# Patient Record
Sex: MALE | Race: WHITE | Employment: OTHER | URBAN - METROPOLITAN AREA
[De-identification: names, ages, dates, MRNs, and addresses within clinical notes are randomized per-mention and may not be internally consistent; named-entity substitution may affect disease eponyms.]

---

## 2019-02-04 RX ORDER — ACETAMINOPHEN 500 MG
1000 TABLET ORAL ONCE
Status: CANCELLED | OUTPATIENT
Start: 2019-02-13 | End: 2019-02-13

## 2019-02-04 RX ORDER — PREGABALIN 75 MG/1
150 CAPSULE ORAL ONCE
Status: CANCELLED | OUTPATIENT
Start: 2019-02-13 | End: 2019-02-13

## 2019-02-04 RX ORDER — CEFAZOLIN SODIUM/WATER 2 G/20 ML
2 SYRINGE (ML) INTRAVENOUS ONCE
Status: CANCELLED | OUTPATIENT
Start: 2019-02-13 | End: 2019-02-13

## 2019-02-04 RX ORDER — SODIUM CHLORIDE, SODIUM LACTATE, POTASSIUM CHLORIDE, CALCIUM CHLORIDE 600; 310; 30; 20 MG/100ML; MG/100ML; MG/100ML; MG/100ML
25 INJECTION, SOLUTION INTRAVENOUS CONTINUOUS
Status: CANCELLED | OUTPATIENT
Start: 2019-02-13

## 2019-02-04 RX ORDER — CELECOXIB 200 MG/1
400 CAPSULE ORAL ONCE
Status: CANCELLED | OUTPATIENT
Start: 2019-02-13 | End: 2019-02-13

## 2019-02-05 ENCOUNTER — HOSPITAL ENCOUNTER (OUTPATIENT)
Dept: PREADMISSION TESTING | Age: 64
Discharge: HOME | End: 2019-02-05
Payer: MEDICARE

## 2019-02-05 ENCOUNTER — HOSPITAL ENCOUNTER (OUTPATIENT)
Dept: PHYSICAL THERAPY | Age: 64
Discharge: HOME | End: 2019-02-05

## 2019-02-05 VITALS
WEIGHT: 171 LBS | HEIGHT: 70 IN | HEART RATE: 69 BPM | DIASTOLIC BLOOD PRESSURE: 74 MMHG | TEMPERATURE: 98.2 F | BODY MASS INDEX: 24.48 KG/M2 | SYSTOLIC BLOOD PRESSURE: 140 MMHG | OXYGEN SATURATION: 100 % | RESPIRATION RATE: 16 BRPM

## 2019-02-05 LAB
ABO + RH BLD: NORMAL
ALBUMIN SERPL-MCNC: 3.8 G/DL (ref 3.5–5)
ALBUMIN/GLOB SERPL: 1 {RATIO} (ref 1.1–2.2)
ALP SERPL-CCNC: 75 U/L (ref 45–117)
ALT SERPL-CCNC: 96 U/L (ref 12–78)
ANION GAP SERPL CALC-SCNC: 6 MMOL/L (ref 5–15)
APPEARANCE UR: CLEAR
AST SERPL-CCNC: 68 U/L (ref 15–37)
ATRIAL RATE: 69 BPM
BACTERIA URNS QL MICRO: NEGATIVE /HPF
BILIRUB SERPL-MCNC: 0.4 MG/DL (ref 0.2–1)
BILIRUB UR QL: NEGATIVE
BLOOD GROUP ANTIBODIES SERPL: NORMAL
BUN SERPL-MCNC: 11 MG/DL (ref 6–20)
BUN/CREAT SERPL: 14 (ref 12–20)
CALCIUM SERPL-MCNC: 8.4 MG/DL (ref 8.5–10.1)
CALCULATED P AXIS, ECG09: 60 DEGREES
CALCULATED R AXIS, ECG10: 30 DEGREES
CALCULATED T AXIS, ECG11: 43 DEGREES
CHLORIDE SERPL-SCNC: 108 MMOL/L (ref 97–108)
CO2 SERPL-SCNC: 25 MMOL/L (ref 21–32)
COLOR UR: ABNORMAL
CREAT SERPL-MCNC: 0.78 MG/DL (ref 0.7–1.3)
DIAGNOSIS, 93000: NORMAL
EPITH CASTS URNS QL MICRO: ABNORMAL /LPF
ERYTHROCYTE [DISTWIDTH] IN BLOOD BY AUTOMATED COUNT: 11.9 % (ref 11.5–14.5)
EST. AVERAGE GLUCOSE BLD GHB EST-MCNC: 108 MG/DL
GLOBULIN SER CALC-MCNC: 3.7 G/DL (ref 2–4)
GLUCOSE SERPL-MCNC: 106 MG/DL (ref 65–100)
GLUCOSE UR STRIP.AUTO-MCNC: NEGATIVE MG/DL
HBA1C MFR BLD: 5.4 % (ref 4.2–6.3)
HCT VFR BLD AUTO: 38.4 % (ref 36.6–50.3)
HGB BLD-MCNC: 13.3 G/DL (ref 12.1–17)
HGB UR QL STRIP: NEGATIVE
HYALINE CASTS URNS QL MICRO: ABNORMAL /LPF (ref 0–5)
INR PPP: 1.1 (ref 0.9–1.1)
KETONES UR QL STRIP.AUTO: NEGATIVE MG/DL
LEUKOCYTE ESTERASE UR QL STRIP.AUTO: NEGATIVE
MCH RBC QN AUTO: 34.4 PG (ref 26–34)
MCHC RBC AUTO-ENTMCNC: 34.6 G/DL (ref 30–36.5)
MCV RBC AUTO: 99.2 FL (ref 80–99)
MUCOUS THREADS URNS QL MICRO: ABNORMAL /LPF
NITRITE UR QL STRIP.AUTO: NEGATIVE
NRBC # BLD: 0 K/UL (ref 0–0.01)
NRBC BLD-RTO: 0 PER 100 WBC
P-R INTERVAL, ECG05: 178 MS
PH UR STRIP: 7 [PH] (ref 5–8)
PLATELET # BLD AUTO: 146 K/UL (ref 150–400)
PMV BLD AUTO: 10.6 FL (ref 8.9–12.9)
POTASSIUM SERPL-SCNC: 4.1 MMOL/L (ref 3.5–5.1)
PROT SERPL-MCNC: 7.5 G/DL (ref 6.4–8.2)
PROT UR STRIP-MCNC: NEGATIVE MG/DL
PROTHROMBIN TIME: 11.4 SEC (ref 9–11.1)
Q-T INTERVAL, ECG07: 408 MS
QRS DURATION, ECG06: 92 MS
QTC CALCULATION (BEZET), ECG08: 437 MS
RBC # BLD AUTO: 3.87 M/UL (ref 4.1–5.7)
RBC #/AREA URNS HPF: ABNORMAL /HPF (ref 0–5)
SODIUM SERPL-SCNC: 139 MMOL/L (ref 136–145)
SP GR UR REFRACTOMETRY: 1.01 (ref 1–1.03)
SPECIMEN EXP DATE BLD: NORMAL
UA: UC IF INDICATED,UAUC: ABNORMAL
UROBILINOGEN UR QL STRIP.AUTO: 0.2 EU/DL (ref 0.2–1)
VENTRICULAR RATE, ECG03: 69 BPM
WBC # BLD AUTO: 6.5 K/UL (ref 4.1–11.1)
WBC URNS QL MICRO: ABNORMAL /HPF (ref 0–4)

## 2019-02-05 PROCEDURE — 97110 THERAPEUTIC EXERCISES: CPT

## 2019-02-05 PROCEDURE — 97116 GAIT TRAINING THERAPY: CPT

## 2019-02-05 PROCEDURE — 97161 PT EVAL LOW COMPLEX 20 MIN: CPT

## 2019-02-05 PROCEDURE — 81001 URINALYSIS AUTO W/SCOPE: CPT

## 2019-02-05 PROCEDURE — 36415 COLL VENOUS BLD VENIPUNCTURE: CPT

## 2019-02-05 PROCEDURE — 85027 COMPLETE CBC AUTOMATED: CPT

## 2019-02-05 PROCEDURE — 80053 COMPREHEN METABOLIC PANEL: CPT

## 2019-02-05 PROCEDURE — 93005 ELECTROCARDIOGRAM TRACING: CPT

## 2019-02-05 PROCEDURE — 85610 PROTHROMBIN TIME: CPT

## 2019-02-05 PROCEDURE — 83036 HEMOGLOBIN GLYCOSYLATED A1C: CPT

## 2019-02-05 PROCEDURE — 86900 BLOOD TYPING SEROLOGIC ABO: CPT

## 2019-02-05 RX ORDER — PREGABALIN 150 MG/1
150 CAPSULE ORAL ONCE
Status: CANCELLED | OUTPATIENT
Start: 2019-02-13 | End: 2019-02-13

## 2019-02-05 RX ORDER — SODIUM CHLORIDE, SODIUM LACTATE, POTASSIUM CHLORIDE, CALCIUM CHLORIDE 600; 310; 30; 20 MG/100ML; MG/100ML; MG/100ML; MG/100ML
25 INJECTION, SOLUTION INTRAVENOUS CONTINUOUS
Status: CANCELLED | OUTPATIENT
Start: 2019-02-13

## 2019-02-05 RX ORDER — CELECOXIB 200 MG/1
400 CAPSULE ORAL ONCE
Status: CANCELLED | OUTPATIENT
Start: 2019-02-13 | End: 2019-02-13

## 2019-02-05 RX ORDER — ACETAMINOPHEN 500 MG
1000 TABLET ORAL ONCE
Status: CANCELLED | OUTPATIENT
Start: 2019-02-13 | End: 2019-02-13

## 2019-02-05 RX ORDER — ERGOCALCIFEROL 1.25 MG/1
50000 CAPSULE ORAL
COMMUNITY

## 2019-02-05 RX ORDER — CEFAZOLIN SODIUM/WATER 2 G/20 ML
2 SYRINGE (ML) INTRAVENOUS ONCE
Status: CANCELLED | OUTPATIENT
Start: 2019-02-06 | End: 2019-02-06

## 2019-02-05 NOTE — PERIOP NOTES
Preventing Infections Before and After  Your SurgeryIMPORTANT INSTRUCTIONSPlease read and follow these instructions carefully. If you are unable to comply with the below instructions your procedure will be cancelled. Every Night for Three (3) nights before your surgery: 1. Shower with an antibacterial soap, such as Dial, or the soap provided at your preassessment appointment. A shower is better than a bath for cleaning your skin. 2. If needed, ask someone to help you reach all areas of your body. Dont forget to clean your belly button with every shower. The night before your surgery: If you lose your Hibiclens please contact surgery center or you can purchase it at a local pharmacy 1. On the night before your surgery, shower with an antibacterial soap, such as Dial, or the soap provided at your preassessment appointment. 2. With one packet of Hibiclens in hand, turn water off. 
3. Apply Hibiclens antiseptic skin cleanser with a clean, freshly washed washcloth. ? Gently apply to your body from chin to toes (except the genital area) and especially the area(s) where your incision(s) will be. ? Leave Hibiclens on your skin for at least 20 seconds. CAUTION: If needed, Hibiclens may be used to clean the folds of skin of the legs (such as in the area of the groin) and on your buttocks and hips. However, do not use Hibiclens above the neck or in the genital area (your bottom) or put inside any area of your body. 4. Turn the water back on and rinse. 5. Dry gently with a clean, freshly washed towel. 6. After your shower, do not use any powder, deodorant, perfumes or lotion. 7. Use clean, freshly washed towels and washcloths every time you shower. 8. Wear clean, freshly washed pajamas to bed the night before surgery. 9. Sleep on clean, freshly washed sheets. 10. Do not allow pets to sleep in your bed with you. The Morning of your surgery: 1. Shower again thoroughly with an antibacterial soap, such as Dial or the soap provided at your preassessment appointment. If needed, ask someone for help to reach all areas of your body. Dont forget to clean your belly button! Rinse. 2. Dry gently with a clean, freshly washed towel. 3. After your shower, do not use any powder, deodorant, perfumes or lotion prior to surgery. 4. Put on clean, freshly washed clothing. Tips to help prevent infections after your surgery: 1. Protect your surgical wound from germs: 
? Hand washing is the most important thing you and your caregivers can do to prevent infections. ? Keep your bandage clean and dry! ? Do not touch your surgical wound. 2. Use clean, freshly washed towels and washcloths every time you shower; do not share bath linens with others. 3. Until your surgical wound is healed, wear clothing and sleep on bed linens each day that are clean and freshly washed. 4. Do not allow pets to sleep in your bed with you or touch your surgical wound. 5. Do not smoke  smoking delays wound healing. This may be a good time to stop smoking. 6. If you have diabetes, it is important for you to manage your blood sugar levels properly before your surgery as well as after your surgery. Poorly managed blood sugar levels slow down wound healing and prevent you from healing completely. If you lose your Hibiclens, please call the [facility], or it is available for purchase at your pharmacy. ___________________      ___________________      ________________ 
(Signature of Patient)          (Witness)                   (Date and Time)

## 2019-02-05 NOTE — PROGRESS NOTES
[facility] Physical Therapy Pre-surgery evaluation [address] [facility], [address] [facility] physical Therapy pre THR surgery EVALUATION Patient: Rhea Stafford (1 y.o. male) Date: 2/5/2019 Primary Diagnosis: left hip Precautions:    
 
ASSESSMENT : 
Based on the objective data described below, the patient presents with impaired gait, balance, pain, and overall high level functional mobility due to end stage degenerative joint disease in the left hip. Has forearm crutches and plans to get RW ads backup plan. Demonstrated good understanding of THR protocol exercises when reviewed. Provided gait training with forearm crutches - he demonstrated good understanding and performance following training. He plans to return home day of surgery or day after. PLOF - independent with all mobility and ADLs but limited by pain in L hip. Hopes to be able to golf and garden with much less pain once recovered from surgery. Discussed anticipated disposition to home with possible discharge within a 1 to 2 day time frame post-surgery. Patient and  in agreement. GOALS: (Goals have been discussed and agreed upon with patient.) DISCHARGE GOALS: Time Frame: 1 DAY 1. Patient will demonstrate increased strength, range of motion, and pain control via a home exercise program in order to minimize functional deficits in preparation for their upcoming surgery. This will be achieved by using education, demonstration and through the use of an informational handout including a home exercise program. 
REHABILITATION POTENTIAL FOR STATED GOALS: Good RECOMMENDATIONS AND PLANNED INTERVENTIONS: (Benefits and precautions of physical therapy have been discussed with the patient.) 1. Home Exercise Program 
TREATMENT PLAN EFFECTIVE DATES: 2/5/2019 TO 2/5/2019 FREQUENCY/DURATION: Patient to continue to perform home exercise program at least twice daily until his surgery.   
 
SUBJECTIVE:  
 Patient stated You were very informative.  OBJECTIVE DATA SUMMARY:  
HISTORY:   
Past Medical History:  
Diagnosis Date  Arthritis   
 hip  Hypertension  Ill-defined condition Glaucoma Bilat  Other ill-defined conditions(799.89)   
 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  
Procedure Laterality Date  COLONOSCOPY N/A 10/24/2016 COLONOSCOPY performed by Darrin Blanco MD at [facility] ENDOSCOPY  COLONOSCOPY,DIAGNOSTIC  10/24/2016  HX HEENT  4-13-15 LARYNGOSCOPY, BX  
 HX HERNIA REPAIR    
 2 sgys, 1 each side as infant and teenager  HX OTHER SURGICAL    
 colonoscopy/polyp  HX TONSILLECTOMY  [place] COLSC FLX W/RMVL OF TUMOR POLYP LESION SNARE TQ  4/19/2012 Prior Level of Function/Home Situation: Lives with spouse in 1 story home with 4 total steps to enter home(1 step each  by entrance and rooms in home). Independent in all ADLs and mobility, but limited by pain. No falls. Has forearm crutches and will try to borrow RW as backup plan. He has a friend who will be  post surgery. Personal factors and/or comorbidities impacting plan of care: None Patient []   does  [x]   does not state signs/symptoms of shortness of breath/dyspnea on exertion/respiratory distress. Home Situation Home Environment: Private residence # Steps to Enter: 4(4 sepatrate 1 step/no rail) Rails to Enter: No 
Wheelchair Ramp: No 
One/Two Story Residence: One story Living Alone: No 
Support Systems: Spouse/Significant Other/Partner, Friends \ neighbors Patient Expects to be Discharged to[de-identified] Private residence Current DME Used/Available at Home: Crutches(forarm crutches) Tub or Shower Type: Shower EXAMINATION/PRESENTATION/DECISION MAKING:  
 
ADLs (Current Functional Status): Bathing/Showering:  
[x] Independent 
[] Requires Assistance from Someone 
[] Sponge Bath Only Ambulation: 
[x] Independent [x] Walk Indoors Only 
[] Walk Outdoors [] Use Assistive Device [] Use Wheelchair Only Dressing: 
[x] Independent Requires Assistance from Someone for: 
[] Sock/Shoes 
[] Pants 
[] Everything Household Activities: 
[] Routine house and yard work [x] Light Housework Only 
[] None Critical Behavior: 
Neurologic State: Alert Orientation Level: Oriented X4 Cognition: Appropriate decision making, Appropriate for age attention/concentration, Appropriate safety awareness, Follows commands Safety/Judgement: Good awareness of safety precautions, Awareness of environment Strength:   
Strength: Generally decreased, functional(3-/5 L hip; 4+/5 R hip) Tone & Sensation:  
Tone: Normal 
  
  
  
  
Sensation: Intact Range Of Motion: 
AROM: Generally decreased, functional(3-/5 L hip; 4+/5 R hip) PROM: Within functional limits(L hip decreased flexion, extension and  abd) Coordination: 
Coordination: Within functional limits Functional Mobility: 
Transfers: 
Sit to Stand: Modified independent Stand to Sit: Modified independent Balance:  
Sitting: Intact Standing: Intact Ambulation/Gait Training: 
Distance (ft): 75 Feet (ft) Assistive Device: Crutches(forearm) Ambulation - Level of Assistance: Modified independent Gait Description (WDL): Exceptions to [facility] Gait Abnormalities: Antalgic, Path deviations, Trunk sway increased Left Side Weight Bearing: As tolerated Base of Support: Widened Stance: Left decreased Speed/Jessica: Pace decreased (<100 feet/min) Step Length: Left shortened, Right shortened Swing Pattern: Left asymmetrical 
  
  
  
  
 Therapeutic Exercises:  
The patient was educated in, has demonstrated, and has received written instructions to complete for their home exercise program per total hip replacement protocol. Functional Measure: 
Lower Extremity Functional Scale (LEFS): 
 
Score 28/80 Percentage of impairment CH 
0% CI 
1-19% CJ 
20-39% CK 
40-59% CL 
60-79% CM 
80-99% CN 
100% LEFS score: 
0-80 80 64-79 47-63 31-46 16-30 1-15 0 Cutt-offs: None establishedTKA and WILLIAM:  
(Hardy et al, 2000) Constance Kwan Ultramar 112 = 9 points MCID = 9 points Pain: 
Pain Scale 1: Numeric (0 - 10) Activity Tolerance:  
Good. Patient []   does  [x]   does not demonstrate signs/symptoms of shortness of breath/dyspnea on exertion/respiratory distress. COMMUNICATION/EDUCATION:  
The patient was educated on: 
[x]         Early post operative mobility is imperative to achieve a patient's desired outcomes and to restore biological function. [x]         Post operative hip precautions may/may not be applicable. These precautions are based on the patient's physician and the procedure(s) performed. [x]         Anterior hip precautions including: ·       No hip extension ·       No external rotation ·       No crossing of the legs/midline 
 
[]         Posterior hip precautions including: ·       No hip flexion >90 degrees ·       No internal rotation ·       No crossing of the legs/midline The patients plan of care was discussed as follows:  
[x]         The patient verbalized understanding of his plan in preparation for their upcoming surgery 
[x]         The patient's  was present for this session 
[]        The patient reports that he/she does not have a  identified at this time 
[x]         The  verbalized understanding of the education regarding the patient's upcoming surgery [x]         Patient/family agree to work toward stated goals and plan of care. []         Patient understands intent and goals of therapy, but is neutral about his/her participation. []         Patient is unable to participate in goal setting and plan of care. Thank you for this referral. 
Doren Severs, PT Time Calculation: 40 mins

## 2019-02-05 NOTE — PERIOP NOTES
[facility] Preoperative Instructions Surgery Date 2/13/19          Time of Arrival :  To Be Called      Contact # [phone] 1. On the day of your surgery, please report to the Surgical Services Registration Desk and sign in at your designated time. The Surgery Center is located to the right of the Emergency Room. 2. You must have someone with you to drive you home. You should not drive a car for 24 hours following surgery. Please make arrangements for a friend or family member to stay with you for the first 24 hours after your surgery. 3. Do not have anything to eat or drink (including water, gum, mints, coffee, juice) after midnight ? 2/12/19  . ? This may not apply to medications prescribed by your physician. ?(Please note below the special instructions with medications to take the morning of your procedure.) 4. We recommend you do not drink any alcoholic beverages for 24 hours before and after your surgery. 5. Contact your surgeons office for instructions on the following medications: non-steroidal anti-inflammatory drugs (i.e. Advil, Aleve), vitamins, and supplements. (Some surgeons will want you to stop these medications prior to surgery and others may allow you to take them) **If you are currently taking Plavix, Coumadin, Aspirin and/or other blood-thinning agents, contact your surgeon for instructions. ** Your surgeon will partner with the physician prescribing these medications to determine if it is safe to stop or if you need to continue taking. Please do not stop taking these medications without instructions from your surgeon 6. Wear comfortable clothes. Wear glasses instead of contacts. Do not bring any money or jewelry. Please bring picture ID, insurance card, and any prearranged co-payment or hospital payment. Do not wear make-up, particularly mascara the morning of your surgery.   Do not wear nail polish, particularly if you are having foot /hand surgery. Wear your hair loose or down, no ponytails, buns, krista pins or clips. All body piercings must be removed. Please shower with antibacterial soap for three consecutive days before and on the morning of surgery, but do not apply any lotions, powders or deodorants after the shower on the day of surgery. Please use a fresh towels after each shower. Please sleep in clean clothes and change bed linens the night before surgery. Please do not shave for 48 hours prior to surgery. Shaving of the face is acceptable. 7. You should understand that if you do not follow these instructions your surgery may be cancelled. If your physical condition changes (I.e. fever, cold or flu) please contact your surgeon as soon as possible. 8. It is important that you be on time. If a situation occurs where you may be late, please call [phone] (OR Holding Area). 9. If you have any questions and or problems, please call [phone] (Pre-admission Testing). 10. Your surgery time may be subject to change. You will receive a phone call the evening prior if your time changes. 11.  If having outpatient surgery, you must have someone to drive you here, stay with you during the duration of your stay, and to drive you home at time of discharge. 12.   In an effort to improve the efficiency, privacy, and safety for all of our Pre-op patients visitors are not allowed in the Holding area. Once you arrive and are registered your family/visitors will be asked to remain in the waiting room. The Pre-op staff will get you from the Surgical Waiting Area and will explain to you and your family/visitors that the Pre-op phase is beginning. The staff will answer any questions and provide instructions for tracking of the patient, by use of the existing tracking number and color-coded status board in the waiting room.   At this time the staff will also ask for your designated spokesperson information in the event that the physician or staff need to provide an update or obtain any pertinent information. The designated spokesperson will be notified if the physician needs to speak to family during the pre-operative phase. If at any time your family/visitors has questions or concerns they may approach the volunteer desk in the waiting area for assistance. Special Instructions: Use Incentive Spirometer 20 times daily prior to surgery. MEDICATIONS TO TAKE THE MORNING OF SURGERY WITH A SIP OF WATER: Amlodipine, Carvedilol, Cosopt, Doxazosin, Hydrocodone if needed I understand a pre-operative phone call will be made to verify my surgery time. In the event that I am not available, I give permission for a message to be left on my answering service and/or with another person? yes 
 
 
 
 ___________________      __________   _________ 
  (Signature of Patient)             (Witness)                (Date and Time)

## 2019-02-05 NOTE — PERIOP NOTES
Incentive Lexi Henderson Using the incentive spirometer helps expand the small air sacs of your lungs, helps you breathe deeply, and helps improve your lung function. Use your incentive spirometer twice a day (10 breaths each time) prior to surgery. How to Use Your Incentive Spirometer: 1. Hold the incentive spirometer in an upright position. 2. Breathe out as usual.  
3. Place the mouthpiece in your mouth and seal your lips tightly around it. 4. Take a deep breath. Breathe in slowly and as deeply as possible. Keep the blue flow rate guide between the arrows. 5. Hold your breath as long as possible. Then exhale slowly and allow the piston to fall to the bottom of the column. 6. Rest for a few seconds and repeat steps one through five at least 10 times. PAT Tidal Volume_____2500_____________  x___10_________  Date_____2/5/19__________________ BRING THE INCENTIVE SPIROMETER WITH YOU TO THE HOSPITAL ON THE DAY OF YOUR SURGERY. Opportunity given to ask and answer questions as well as to observe return demonstration. Patient signature_____________________________    Witness____________________________

## 2019-02-06 LAB
BACTERIA SPEC CULT: NORMAL
BACTERIA SPEC CULT: NORMAL
SERVICE CMNT-IMP: NORMAL

## 2019-02-12 NOTE — PERIOP NOTES
Preop phone call made and TOA given. Patient instructed that they may have up to [facility] of water after midnight until 0400, then NPO. Patient verbalized understanding.

## 2019-02-12 NOTE — H&P
Ismael Morrison MD - Adult Reconstruction and Total Joint Replacement Orthopaedic History and Physical 
    NAME: Alivia Sabillon :  1955 MRN:  [id number] Subjective:  
Patient ID: Alivia Sabillon is a 70 y.o. male. 
  
Chief Complaint: Pain of the Left Hip 
  
He presents for evaluation of gradually worsening left hip pain ongoing for a while and discussion of L WILLIAM. No complaints of the contralateral side. He smokes about half a pack per day, is not diabetic, denies any cardiac, pulmonary or kidney issues. 
  
 
There are no active problems to display for this patient. Past Medical History:  
Diagnosis Date  Arthritis   
 hip  Hypertension  Ill-defined condition Glaucoma Bilat  Other ill-defined conditions(799.89)   
 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  
Procedure Laterality Date  COLONOSCOPY N/A 10/24/2016 COLONOSCOPY performed by Patricia Giraldo MD at [facility] ENDOSCOPY  COLONOSCOPY,DIAGNOSTIC  10/24/2016  HX HEENT  4-13-15 LARYNGOSCOPY, BX and removal x 2  
 HX HERNIA REPAIR    
 2 sgys, 1 each side as infant and teenager  HX OTHER SURGICAL    
 colonoscopy/polyp  HX TONSILLECTOMY  [place] COLSC FLX W/RMVL OF TUMOR POLYP LESION SNARE TQ  2012 Prior to Admission medications Medication Sig Start Date End Date Taking? Authorizing Provider  
ergocalciferol (VITAMIN D2) 50,000 unit capsule Take 50,000 Units by mouth every seven (7) days.     Provider, Historical  
ferrous sulfate (IRON) 325 mg (65 mg iron) tablet Take 65 mg by mouth Daily (before breakfast). Provider, Historical  
bimatoprost (LUMIGAN) 0.01 % ophthalmic drops Administer 1 Drop to both eyes every evening. Provider, Historical  
fish oil-dha-epa 1,200-144-216 mg cap Take 2 Tabs by mouth two (2) times a day.     Provider, Historical  
 rosuvastatin (CRESTOR) 40 mg tablet Take 20 mg by mouth two (2) times a day. Provider, Historical  
HYDROcodone-acetaminophen (NORCO) 7.5-325 mg per tablet Take 1 Tab by mouth as needed for Pain. Provider, Historical  
doxazosin (CARDURA) 4 mg tablet Take 4 mg by mouth daily. Provider, Historical  
CARVEDILOL (COREG PO) Take 12.5 mg by mouth two (2) times a day. Provider, Historical  
AMLODIPINE BESYLATE (AMLODIPINE PO) Take 10 mg by mouth every morning. Provider, Historical  
diclofenac EC (VOLTAREN) 75 mg EC tablet Take 75 mg by mouth two (2) times a day. Provider, Historical  
DORZOLAMIDE HCL/TIMOLOL MALEAT (COSOPT OP) Apply 2 Drops to eye two (2) times a day. Provider, Historical  
multivitamin (ONE A DAY) tablet Take 1 Tab by mouth daily. Provider, Historical  
GLUC/VERNELL-MSM#1/C/CASSIE/BROCK/BOR (OSTEO BI-FLEX PO) Take 1 Tab by mouth every evening. Provider, Historical  
 
Allergies Allergen Reactions  Ace Inhibitors Shortness of Breath and Swelling  Bee Sting [Sting, Bee] Shortness of Breath and Swelling  Sulfa (Sulfonamide Antibiotics) Other (comments) As infant- unknown reaction Social History Tobacco Use  Smoking status: Former Smoker Packs/day: 0.50 Years: 40.00 Pack years: 20.00 Last attempt to quit: 2019 Years since quittin.0  Smokeless tobacco: Never Used  Tobacco comment: 3-4 CIGS PER DAY Substance Use Topics  Alcohol use: Yes Alcohol/week: 16.8 oz Types: 28 Cans of beer per week Comment: 16 BEERS PER WEEK Family History Problem Relation Age of Onset  Hypertension Mother Karla.Shelter Arthritis-osteo Mother  Diabetes Father  Glaucoma Father  No Known Problems Sister  No Known Problems Sister  No Known Problems Sister  No Known Problems Sister  Anesth Problems Neg Hx REVIEW OF SYSTEMS: A comprehensive review of systems was negative except for that written in the HPI. Objective:  
Constitutional: He appears stated age. Pt is cooperative and is in no acute distress. Well nourished. Well developed. Body habitus is normal. Body mass index is 23.34 kg/m². Assistive devices: none Eyes:  Sclera are nonicteric. Respiratory:  No labored breathing. Cardiovascular:  No marked edema. Well perfused extremities bilaterally. Skin:  No marked skin ulcers. No lymphedema or skin abnormalities. Neurological:  No marked sensory loss noted. Grossly neurovascularly intact. Both lower extremities are intact to distal sensory and motor function. Psychiatric: Alert and oriented x3. 
  
MUSCULOSKELETAL: Lumbar spine is nonfocal. No obvious LLD. 5/5 BLE strength. Limited ROM of the L hip with essentially no IR. Pain with PROM. Nontender ipsilateral knee exam. 
  
  
Radiographs:   
  
X-ray Hip Left 2+ Views ([id number]) 
  Result Date: 1/8/2019 Supine. AP, Frog Lateral.  
  
Impression: Severe arthritic changes throughout the hip with bone on bone articulation, cystic changes in the femoral head, osteophytes on both side of the joint, sclerosis, cam lesion. 
  
 Assessment:  
  
    ICD-10-CM 1. Primary osteoarthritis of left hip M16.12  
   
  
 Plan: At this time, I recommended L WILLIAM. Conservative treatments including activity modification, medication, and steroid injections have not successfully relieved pain. Surgery was discussed at length with the pt today. We went over all the pertinent risks, benefits, and alternatives to the procedure. They understand no guarantees can be made about the outcome and they would like to proceed. I discussed the pre-op total joint class and general recovery timeline with the pt. The pt understands the need for medical clearance. I recommended smoking cessation.   We will plan for the L WILLIAM in 3-6 weeks.  
  
   
  
Scribed for Dr. Chandrika Tompkins by Hanley Meigs Janora Idol, PA

## 2019-02-13 ENCOUNTER — ANESTHESIA EVENT (OUTPATIENT)
Dept: SURGERY | Age: 64
DRG: 470 | End: 2019-02-13
Payer: MEDICARE

## 2019-02-13 ENCOUNTER — APPOINTMENT (OUTPATIENT)
Dept: GENERAL RADIOLOGY | Age: 64
DRG: 470 | End: 2019-02-13
Attending: ORTHOPAEDIC SURGERY
Payer: MEDICARE

## 2019-02-13 ENCOUNTER — ANESTHESIA (OUTPATIENT)
Dept: SURGERY | Age: 64
DRG: 470 | End: 2019-02-13
Payer: MEDICARE

## 2019-02-13 ENCOUNTER — HOSPITAL ENCOUNTER (INPATIENT)
Age: 64
LOS: 1 days | Discharge: HOME HEALTH | DRG: 470 | End: 2019-02-14
Attending: ORTHOPAEDIC SURGERY | Admitting: ORTHOPAEDIC SURGERY
Payer: MEDICARE

## 2019-02-13 DIAGNOSIS — Z96.642 STATUS POST LEFT HIP REPLACEMENT: Primary | ICD-10-CM

## 2019-02-13 PROBLEM — Z96.649 S/P HIP REPLACEMENT: Status: ACTIVE | Noted: 2019-02-13

## 2019-02-13 PROCEDURE — 77030036660

## 2019-02-13 PROCEDURE — 77030035643 HC BLD SAW OSC PRECIS STRY -C: Performed by: ORTHOPAEDIC SURGERY

## 2019-02-13 PROCEDURE — 77010033678 HC OXYGEN DAILY

## 2019-02-13 PROCEDURE — 97116 GAIT TRAINING THERAPY: CPT | Performed by: PHYSICAL THERAPIST

## 2019-02-13 PROCEDURE — 74011000258 HC RX REV CODE- 258

## 2019-02-13 PROCEDURE — 77030032490 HC SLV COMPR SCD KNE COVD -B: Performed by: ORTHOPAEDIC SURGERY

## 2019-02-13 PROCEDURE — 77030033138 HC SUT PGA STRATFX J&J -B: Performed by: ORTHOPAEDIC SURGERY

## 2019-02-13 PROCEDURE — 73501 X-RAY EXAM HIP UNI 1 VIEW: CPT

## 2019-02-13 PROCEDURE — 77030014647 HC SEAL FBRN TISSL BAXT -D: Performed by: ORTHOPAEDIC SURGERY

## 2019-02-13 PROCEDURE — C1776 JOINT DEVICE (IMPLANTABLE): HCPCS | Performed by: ORTHOPAEDIC SURGERY

## 2019-02-13 PROCEDURE — 74011250636 HC RX REV CODE- 250/636

## 2019-02-13 PROCEDURE — 94762 N-INVAS EAR/PLS OXIMTRY CONT: CPT

## 2019-02-13 PROCEDURE — 77030020788: Performed by: ORTHOPAEDIC SURGERY

## 2019-02-13 PROCEDURE — 76000 FLUOROSCOPY <1 HR PHYS/QHP: CPT

## 2019-02-13 PROCEDURE — 74011000250 HC RX REV CODE- 250: Performed by: PHYSICIAN ASSISTANT

## 2019-02-13 PROCEDURE — 76210000016 HC OR PH I REC 1 TO 1.5 HR: Performed by: ORTHOPAEDIC SURGERY

## 2019-02-13 PROCEDURE — 74011000258 HC RX REV CODE- 258: Performed by: PHYSICIAN ASSISTANT

## 2019-02-13 PROCEDURE — 77030011640 HC PAD GRND REM COVD -A: Performed by: ORTHOPAEDIC SURGERY

## 2019-02-13 PROCEDURE — 74011250636 HC RX REV CODE- 250/636: Performed by: ORTHOPAEDIC SURGERY

## 2019-02-13 PROCEDURE — 74011250637 HC RX REV CODE- 250/637: Performed by: PHYSICIAN ASSISTANT

## 2019-02-13 PROCEDURE — 77030022704 HC SUT VLOC COVD -B: Performed by: ORTHOPAEDIC SURGERY

## 2019-02-13 PROCEDURE — 74011250636 HC RX REV CODE- 250/636: Performed by: ANESTHESIOLOGY

## 2019-02-13 PROCEDURE — 77030039267 HC ADH SKN EXOFIN S2SG -B: Performed by: ORTHOPAEDIC SURGERY

## 2019-02-13 PROCEDURE — 97161 PT EVAL LOW COMPLEX 20 MIN: CPT | Performed by: PHYSICAL THERAPIST

## 2019-02-13 PROCEDURE — 77030003666 HC NDL SPINAL BD -A: Performed by: ORTHOPAEDIC SURGERY

## 2019-02-13 PROCEDURE — 77030018846 HC SOL IRR STRL H20 ICUM -A: Performed by: ORTHOPAEDIC SURGERY

## 2019-02-13 PROCEDURE — 74011250637 HC RX REV CODE- 250/637: Performed by: ORTHOPAEDIC SURGERY

## 2019-02-13 PROCEDURE — 77030008684 HC TU ET CUF COVD -B: Performed by: NURSE ANESTHETIST, CERTIFIED REGISTERED

## 2019-02-13 PROCEDURE — 77030026438 HC STYL ET INTUB CARD -A: Performed by: NURSE ANESTHETIST, CERTIFIED REGISTERED

## 2019-02-13 PROCEDURE — 65270000029 HC RM PRIVATE

## 2019-02-13 PROCEDURE — 74011250636 HC RX REV CODE- 250/636: Performed by: PHYSICIAN ASSISTANT

## 2019-02-13 PROCEDURE — 77030019908 HC STETH ESOPH SIMS -A: Performed by: NURSE ANESTHETIST, CERTIFIED REGISTERED

## 2019-02-13 PROCEDURE — 8E0YXBF COMPUTER ASSISTED PROCEDURE OF LOWER EXTREMITY, WITH FLUOROSCOPY: ICD-10-PCS | Performed by: ORTHOPAEDIC SURGERY

## 2019-02-13 PROCEDURE — 94761 N-INVAS EAR/PLS OXIMETRY MLT: CPT

## 2019-02-13 PROCEDURE — 77030031139 HC SUT VCRL2 J&J -A: Performed by: ORTHOPAEDIC SURGERY

## 2019-02-13 PROCEDURE — 76010000161 HC OR TIME 1 TO 1.5 HR INTENSV-TIER 1: Performed by: ORTHOPAEDIC SURGERY

## 2019-02-13 PROCEDURE — 74011000250 HC RX REV CODE- 250

## 2019-02-13 PROCEDURE — 77030020782 HC GWN BAIR PAWS FLX 3M -B

## 2019-02-13 PROCEDURE — 77030018723 HC ELCTRD BLD COVD -A: Performed by: ORTHOPAEDIC SURGERY

## 2019-02-13 PROCEDURE — 76060000033 HC ANESTHESIA 1 TO 1.5 HR: Performed by: ORTHOPAEDIC SURGERY

## 2019-02-13 PROCEDURE — 77030018836 HC SOL IRR NACL ICUM -A: Performed by: ORTHOPAEDIC SURGERY

## 2019-02-13 PROCEDURE — 77030036722: Performed by: ORTHOPAEDIC SURGERY

## 2019-02-13 PROCEDURE — 77030035236 HC SUT PDS STRATFX BARB J&J -B: Performed by: ORTHOPAEDIC SURGERY

## 2019-02-13 PROCEDURE — 0SRB04A REPLACEMENT OF LEFT HIP JOINT WITH CERAMIC ON POLYETHYLENE SYNTHETIC SUBSTITUTE, UNCEMENTED, OPEN APPROACH: ICD-10-PCS | Performed by: ORTHOPAEDIC SURGERY

## 2019-02-13 PROCEDURE — 77030008467 HC STPLR SKN COVD -B: Performed by: ORTHOPAEDIC SURGERY

## 2019-02-13 DEVICE — COMPONENT HIP PRSS FT CERM ON POLYETH [EXACBSVH1COP] [EXACTECH INC]: Type: IMPLANTABLE DEVICE | Site: HIP | Status: FUNCTIONAL

## 2019-02-13 DEVICE — IMPLANTABLE DEVICE
Type: IMPLANTABLE DEVICE | Site: HIP | Status: FUNCTIONAL
Brand: EXACTECH

## 2019-02-13 DEVICE — IMPLANTABLE DEVICE
Type: IMPLANTABLE DEVICE | Site: HIP | Status: FUNCTIONAL
Brand: NOVATION

## 2019-02-13 DEVICE — IMPLANTABLE DEVICE
Type: IMPLANTABLE DEVICE | Site: HIP | Status: FUNCTIONAL
Brand: ALTEON

## 2019-02-13 RX ORDER — LIDOCAINE HYDROCHLORIDE 20 MG/ML
INJECTION, SOLUTION EPIDURAL; INFILTRATION; INTRACAUDAL; PERINEURAL AS NEEDED
Status: DISCONTINUED | OUTPATIENT
Start: 2019-02-13 | End: 2019-02-13 | Stop reason: HOSPADM

## 2019-02-13 RX ORDER — LATANOPROST 50 UG/ML
1 SOLUTION/ DROPS OPHTHALMIC EVERY EVENING
Status: DISCONTINUED | OUTPATIENT
Start: 2019-02-13 | End: 2019-02-14 | Stop reason: HOSPADM

## 2019-02-13 RX ORDER — CARVEDILOL 3.12 MG/1
12.5 TABLET ORAL 2 TIMES DAILY
Status: DISCONTINUED | OUTPATIENT
Start: 2019-02-13 | End: 2019-02-14 | Stop reason: HOSPADM

## 2019-02-13 RX ORDER — SODIUM CHLORIDE 0.9 % (FLUSH) 0.9 %
5-40 SYRINGE (ML) INJECTION EVERY 8 HOURS
Status: DISCONTINUED | OUTPATIENT
Start: 2019-02-13 | End: 2019-02-14 | Stop reason: HOSPADM

## 2019-02-13 RX ORDER — ROPIVACAINE HYDROCHLORIDE 5 MG/ML
INJECTION, SOLUTION EPIDURAL; INFILTRATION; PERINEURAL AS NEEDED
Status: DISCONTINUED | OUTPATIENT
Start: 2019-02-13 | End: 2019-02-13 | Stop reason: HOSPADM

## 2019-02-13 RX ORDER — HYDROMORPHONE HYDROCHLORIDE 2 MG/ML
INJECTION, SOLUTION INTRAMUSCULAR; INTRAVENOUS; SUBCUTANEOUS AS NEEDED
Status: DISCONTINUED | OUTPATIENT
Start: 2019-02-13 | End: 2019-02-13 | Stop reason: HOSPADM

## 2019-02-13 RX ORDER — DEXAMETHASONE SODIUM PHOSPHATE 4 MG/ML
10 INJECTION, SOLUTION INTRA-ARTICULAR; INTRALESIONAL; INTRAMUSCULAR; INTRAVENOUS; SOFT TISSUE ONCE
Status: COMPLETED | OUTPATIENT
Start: 2019-02-14 | End: 2019-02-14

## 2019-02-13 RX ORDER — AMOXICILLIN 250 MG
1 CAPSULE ORAL 2 TIMES DAILY
Qty: 60 TAB | Refills: 0 | Status: SHIPPED | OUTPATIENT
Start: 2019-02-13

## 2019-02-13 RX ORDER — FACIAL-BODY WIPES
10 EACH TOPICAL DAILY PRN
Status: DISCONTINUED | OUTPATIENT
Start: 2019-02-15 | End: 2019-02-14 | Stop reason: HOSPADM

## 2019-02-13 RX ORDER — SODIUM CHLORIDE, SODIUM LACTATE, POTASSIUM CHLORIDE, CALCIUM CHLORIDE 600; 310; 30; 20 MG/100ML; MG/100ML; MG/100ML; MG/100ML
100 INJECTION, SOLUTION INTRAVENOUS CONTINUOUS
Status: DISCONTINUED | OUTPATIENT
Start: 2019-02-13 | End: 2019-02-13 | Stop reason: HOSPADM

## 2019-02-13 RX ORDER — DIPHENHYDRAMINE HCL 12.5MG/5ML
12.5 ELIXIR ORAL
Status: DISCONTINUED | OUTPATIENT
Start: 2019-02-13 | End: 2019-02-14 | Stop reason: HOSPADM

## 2019-02-13 RX ORDER — SODIUM CHLORIDE 0.9 % (FLUSH) 0.9 %
5-40 SYRINGE (ML) INJECTION EVERY 8 HOURS
Status: DISCONTINUED | OUTPATIENT
Start: 2019-02-13 | End: 2019-02-13 | Stop reason: HOSPADM

## 2019-02-13 RX ORDER — ONDANSETRON 2 MG/ML
INJECTION INTRAMUSCULAR; INTRAVENOUS AS NEEDED
Status: DISCONTINUED | OUTPATIENT
Start: 2019-02-13 | End: 2019-02-13 | Stop reason: HOSPADM

## 2019-02-13 RX ORDER — AMOXICILLIN 250 MG
1 CAPSULE ORAL 2 TIMES DAILY
Status: DISCONTINUED | OUTPATIENT
Start: 2019-02-13 | End: 2019-02-14 | Stop reason: HOSPADM

## 2019-02-13 RX ORDER — MIDAZOLAM HYDROCHLORIDE 1 MG/ML
1 INJECTION, SOLUTION INTRAMUSCULAR; INTRAVENOUS AS NEEDED
Status: DISCONTINUED | OUTPATIENT
Start: 2019-02-13 | End: 2019-02-13 | Stop reason: HOSPADM

## 2019-02-13 RX ORDER — NALOXONE HYDROCHLORIDE 0.4 MG/ML
0.4 INJECTION, SOLUTION INTRAMUSCULAR; INTRAVENOUS; SUBCUTANEOUS AS NEEDED
Status: DISCONTINUED | OUTPATIENT
Start: 2019-02-13 | End: 2019-02-14 | Stop reason: HOSPADM

## 2019-02-13 RX ORDER — FAMOTIDINE 20 MG/1
20 TABLET, FILM COATED ORAL 2 TIMES DAILY
Status: DISCONTINUED | OUTPATIENT
Start: 2019-02-13 | End: 2019-02-14 | Stop reason: HOSPADM

## 2019-02-13 RX ORDER — ACETAMINOPHEN 500 MG
1000 TABLET ORAL ONCE
Status: COMPLETED | OUTPATIENT
Start: 2019-02-13 | End: 2019-02-13

## 2019-02-13 RX ORDER — SODIUM CHLORIDE 0.9 % (FLUSH) 0.9 %
5-40 SYRINGE (ML) INJECTION AS NEEDED
Status: DISCONTINUED | OUTPATIENT
Start: 2019-02-13 | End: 2019-02-13 | Stop reason: HOSPADM

## 2019-02-13 RX ORDER — DEXAMETHASONE SODIUM PHOSPHATE 4 MG/ML
INJECTION, SOLUTION INTRA-ARTICULAR; INTRALESIONAL; INTRAMUSCULAR; INTRAVENOUS; SOFT TISSUE AS NEEDED
Status: DISCONTINUED | OUTPATIENT
Start: 2019-02-13 | End: 2019-02-13 | Stop reason: HOSPADM

## 2019-02-13 RX ORDER — SODIUM CHLORIDE 9 MG/ML
25 INJECTION, SOLUTION INTRAVENOUS CONTINUOUS
Status: DISCONTINUED | OUTPATIENT
Start: 2019-02-13 | End: 2019-02-13 | Stop reason: HOSPADM

## 2019-02-13 RX ORDER — SODIUM CHLORIDE, SODIUM LACTATE, POTASSIUM CHLORIDE, CALCIUM CHLORIDE 600; 310; 30; 20 MG/100ML; MG/100ML; MG/100ML; MG/100ML
25 INJECTION, SOLUTION INTRAVENOUS CONTINUOUS
Status: DISCONTINUED | OUTPATIENT
Start: 2019-02-13 | End: 2019-02-13 | Stop reason: HOSPADM

## 2019-02-13 RX ORDER — DORZOLAMIDE HYDROCHLORIDE AND TIMOLOL MALEATE PRESERVATIVE FREE 20; 5 MG/ML; MG/ML
1 SOLUTION/ DROPS OPHTHALMIC 2 TIMES DAILY
Status: DISCONTINUED | OUTPATIENT
Start: 2019-02-13 | End: 2019-02-14 | Stop reason: HOSPADM

## 2019-02-13 RX ORDER — TRANEXAMIC ACID 100 MG/ML
INJECTION, SOLUTION INTRAVENOUS
Status: DISPENSED
Start: 2019-02-13 | End: 2019-02-13

## 2019-02-13 RX ORDER — LIDOCAINE HYDROCHLORIDE 10 MG/ML
0.1 INJECTION, SOLUTION EPIDURAL; INFILTRATION; INTRACAUDAL; PERINEURAL AS NEEDED
Status: DISCONTINUED | OUTPATIENT
Start: 2019-02-13 | End: 2019-02-13 | Stop reason: HOSPADM

## 2019-02-13 RX ORDER — FENTANYL CITRATE 50 UG/ML
50 INJECTION, SOLUTION INTRAMUSCULAR; INTRAVENOUS AS NEEDED
Status: DISCONTINUED | OUTPATIENT
Start: 2019-02-13 | End: 2019-02-13 | Stop reason: HOSPADM

## 2019-02-13 RX ORDER — POLYETHYLENE GLYCOL 3350 17 G/17G
17 POWDER, FOR SOLUTION ORAL DAILY
Qty: 255 G | Refills: 0 | Status: SHIPPED | OUTPATIENT
Start: 2019-02-13 | End: 2019-02-28

## 2019-02-13 RX ORDER — SODIUM CHLORIDE 9 MG/ML
INJECTION, SOLUTION INTRAVENOUS
Status: COMPLETED
Start: 2019-02-13 | End: 2019-02-13

## 2019-02-13 RX ORDER — SUCCINYLCHOLINE CHLORIDE 20 MG/ML
INJECTION INTRAMUSCULAR; INTRAVENOUS AS NEEDED
Status: DISCONTINUED | OUTPATIENT
Start: 2019-02-13 | End: 2019-02-13 | Stop reason: HOSPADM

## 2019-02-13 RX ORDER — ROPIVACAINE HYDROCHLORIDE 5 MG/ML
30 INJECTION, SOLUTION EPIDURAL; INFILTRATION; PERINEURAL AS NEEDED
Status: DISCONTINUED | OUTPATIENT
Start: 2019-02-13 | End: 2019-02-13 | Stop reason: HOSPADM

## 2019-02-13 RX ORDER — FAMOTIDINE 20 MG/1
20 TABLET, FILM COATED ORAL 2 TIMES DAILY
Qty: 60 TAB | Refills: 0 | Status: SHIPPED | OUTPATIENT
Start: 2019-02-13 | End: 2019-03-15

## 2019-02-13 RX ORDER — SODIUM CHLORIDE 0.9 % (FLUSH) 0.9 %
5-40 SYRINGE (ML) INJECTION AS NEEDED
Status: DISCONTINUED | OUTPATIENT
Start: 2019-02-13 | End: 2019-02-14 | Stop reason: HOSPADM

## 2019-02-13 RX ORDER — CEFAZOLIN SODIUM/WATER 2 G/20 ML
2 SYRINGE (ML) INTRAVENOUS EVERY 8 HOURS
Status: COMPLETED | OUTPATIENT
Start: 2019-02-13 | End: 2019-02-13

## 2019-02-13 RX ORDER — DICLOFENAC SODIUM 75 MG/1
75 TABLET, DELAYED RELEASE ORAL 2 TIMES DAILY
Status: SHIPPED | COMMUNITY
Start: 2019-02-13

## 2019-02-13 RX ORDER — DOXAZOSIN 2 MG/1
4 TABLET ORAL DAILY
Status: DISCONTINUED | OUTPATIENT
Start: 2019-02-14 | End: 2019-02-14 | Stop reason: HOSPADM

## 2019-02-13 RX ORDER — CELECOXIB 200 MG/1
400 CAPSULE ORAL ONCE
Status: COMPLETED | OUTPATIENT
Start: 2019-02-13 | End: 2019-02-13

## 2019-02-13 RX ORDER — OXYCODONE HYDROCHLORIDE 5 MG/1
5-10 TABLET ORAL
Qty: 80 TAB | Refills: 0 | Status: SHIPPED | OUTPATIENT
Start: 2019-02-13

## 2019-02-13 RX ORDER — PROPOFOL 10 MG/ML
INJECTION, EMULSION INTRAVENOUS AS NEEDED
Status: DISCONTINUED | OUTPATIENT
Start: 2019-02-13 | End: 2019-02-13 | Stop reason: HOSPADM

## 2019-02-13 RX ORDER — PREGABALIN 75 MG/1
150 CAPSULE ORAL ONCE
Status: COMPLETED | OUTPATIENT
Start: 2019-02-13 | End: 2019-02-13

## 2019-02-13 RX ORDER — HYDROMORPHONE HYDROCHLORIDE 1 MG/ML
0.5 INJECTION, SOLUTION INTRAMUSCULAR; INTRAVENOUS; SUBCUTANEOUS
Status: DISCONTINUED | OUTPATIENT
Start: 2019-02-13 | End: 2019-02-13 | Stop reason: HOSPADM

## 2019-02-13 RX ORDER — FISH OIL/DHA/EPA 1200-144MG
2 CAPSULE ORAL 2 TIMES DAILY
Status: SHIPPED | COMMUNITY
Start: 2019-02-13

## 2019-02-13 RX ORDER — MIDAZOLAM HYDROCHLORIDE 1 MG/ML
INJECTION, SOLUTION INTRAMUSCULAR; INTRAVENOUS AS NEEDED
Status: DISCONTINUED | OUTPATIENT
Start: 2019-02-13 | End: 2019-02-13 | Stop reason: HOSPADM

## 2019-02-13 RX ORDER — ONDANSETRON 2 MG/ML
4 INJECTION INTRAMUSCULAR; INTRAVENOUS
Status: DISCONTINUED | OUTPATIENT
Start: 2019-02-13 | End: 2019-02-14 | Stop reason: HOSPADM

## 2019-02-13 RX ORDER — GLYCOPYRROLATE 0.2 MG/ML
INJECTION INTRAMUSCULAR; INTRAVENOUS AS NEEDED
Status: DISCONTINUED | OUTPATIENT
Start: 2019-02-13 | End: 2019-02-13 | Stop reason: HOSPADM

## 2019-02-13 RX ORDER — SODIUM CHLORIDE 9 MG/ML
125 INJECTION, SOLUTION INTRAVENOUS CONTINUOUS
Status: DISPENSED | OUTPATIENT
Start: 2019-02-13 | End: 2019-02-14

## 2019-02-13 RX ORDER — MORPHINE SULFATE 10 MG/ML
2 INJECTION, SOLUTION INTRAMUSCULAR; INTRAVENOUS
Status: DISCONTINUED | OUTPATIENT
Start: 2019-02-13 | End: 2019-02-13 | Stop reason: HOSPADM

## 2019-02-13 RX ORDER — ATORVASTATIN CALCIUM 10 MG/1
40 TABLET, FILM COATED ORAL 2 TIMES DAILY
Status: DISCONTINUED | OUTPATIENT
Start: 2019-02-13 | End: 2019-02-14 | Stop reason: HOSPADM

## 2019-02-13 RX ORDER — POLYETHYLENE GLYCOL 3350 17 G/17G
17 POWDER, FOR SOLUTION ORAL DAILY
Status: DISCONTINUED | OUTPATIENT
Start: 2019-02-13 | End: 2019-02-14 | Stop reason: HOSPADM

## 2019-02-13 RX ORDER — ONDANSETRON 2 MG/ML
4 INJECTION INTRAMUSCULAR; INTRAVENOUS AS NEEDED
Status: DISCONTINUED | OUTPATIENT
Start: 2019-02-13 | End: 2019-02-13 | Stop reason: HOSPADM

## 2019-02-13 RX ORDER — OXYCODONE HYDROCHLORIDE 5 MG/1
10 TABLET ORAL
Status: DISCONTINUED | OUTPATIENT
Start: 2019-02-13 | End: 2019-02-14 | Stop reason: HOSPADM

## 2019-02-13 RX ORDER — ACETAMINOPHEN 325 MG/1
650 TABLET ORAL EVERY 6 HOURS
Status: DISCONTINUED | OUTPATIENT
Start: 2019-02-13 | End: 2019-02-14 | Stop reason: HOSPADM

## 2019-02-13 RX ORDER — DIPHENHYDRAMINE HYDROCHLORIDE 50 MG/ML
12.5 INJECTION, SOLUTION INTRAMUSCULAR; INTRAVENOUS AS NEEDED
Status: DISCONTINUED | OUTPATIENT
Start: 2019-02-13 | End: 2019-02-13 | Stop reason: HOSPADM

## 2019-02-13 RX ORDER — ROCURONIUM BROMIDE 10 MG/ML
INJECTION, SOLUTION INTRAVENOUS AS NEEDED
Status: DISCONTINUED | OUTPATIENT
Start: 2019-02-13 | End: 2019-02-13 | Stop reason: HOSPADM

## 2019-02-13 RX ORDER — CEFAZOLIN SODIUM/WATER 2 G/20 ML
2 SYRINGE (ML) INTRAVENOUS ONCE
Status: COMPLETED | OUTPATIENT
Start: 2019-02-13 | End: 2019-02-13

## 2019-02-13 RX ORDER — FENTANYL CITRATE 50 UG/ML
INJECTION, SOLUTION INTRAMUSCULAR; INTRAVENOUS AS NEEDED
Status: DISCONTINUED | OUTPATIENT
Start: 2019-02-13 | End: 2019-02-13 | Stop reason: HOSPADM

## 2019-02-13 RX ORDER — ACETAMINOPHEN 325 MG/1
650 TABLET ORAL EVERY 6 HOURS
Qty: 112 TAB | Refills: 0 | Status: SHIPPED | OUTPATIENT
Start: 2019-02-13 | End: 2019-02-27

## 2019-02-13 RX ORDER — MIDAZOLAM HYDROCHLORIDE 1 MG/ML
0.5 INJECTION, SOLUTION INTRAMUSCULAR; INTRAVENOUS
Status: DISCONTINUED | OUTPATIENT
Start: 2019-02-13 | End: 2019-02-13 | Stop reason: HOSPADM

## 2019-02-13 RX ORDER — KETOROLAC TROMETHAMINE 30 MG/ML
15 INJECTION, SOLUTION INTRAMUSCULAR; INTRAVENOUS EVERY 6 HOURS
Status: DISCONTINUED | OUTPATIENT
Start: 2019-02-13 | End: 2019-02-14 | Stop reason: HOSPADM

## 2019-02-13 RX ORDER — GUAIFENESIN 100 MG/5ML
81 LIQUID (ML) ORAL 2 TIMES DAILY
Qty: 60 TAB | Refills: 0 | Status: SHIPPED | OUTPATIENT
Start: 2019-02-13 | End: 2019-03-15

## 2019-02-13 RX ORDER — OXYCODONE HYDROCHLORIDE 5 MG/1
5 TABLET ORAL
Status: DISCONTINUED | OUTPATIENT
Start: 2019-02-13 | End: 2019-02-14 | Stop reason: HOSPADM

## 2019-02-13 RX ORDER — NEOSTIGMINE METHYLSULFATE 1 MG/ML
INJECTION INTRAVENOUS AS NEEDED
Status: DISCONTINUED | OUTPATIENT
Start: 2019-02-13 | End: 2019-02-13 | Stop reason: HOSPADM

## 2019-02-13 RX ORDER — MORPHINE SULFATE 2 MG/ML
2 INJECTION, SOLUTION INTRAMUSCULAR; INTRAVENOUS
Status: DISCONTINUED | OUTPATIENT
Start: 2019-02-13 | End: 2019-02-14 | Stop reason: HOSPADM

## 2019-02-13 RX ORDER — FENTANYL CITRATE 50 UG/ML
25 INJECTION, SOLUTION INTRAMUSCULAR; INTRAVENOUS
Status: COMPLETED | OUTPATIENT
Start: 2019-02-13 | End: 2019-02-13

## 2019-02-13 RX ORDER — GUAIFENESIN 100 MG/5ML
81 LIQUID (ML) ORAL 2 TIMES DAILY
Status: DISCONTINUED | OUTPATIENT
Start: 2019-02-13 | End: 2019-02-14 | Stop reason: HOSPADM

## 2019-02-13 RX ADMIN — HYDROMORPHONE HYDROCHLORIDE 0.5 MG: 1 INJECTION, SOLUTION INTRAMUSCULAR; INTRAVENOUS; SUBCUTANEOUS at 10:10

## 2019-02-13 RX ADMIN — ROCURONIUM BROMIDE 10 MG: 10 INJECTION, SOLUTION INTRAVENOUS at 08:16

## 2019-02-13 RX ADMIN — ACETAMINOPHEN 650 MG: 325 TABLET ORAL at 17:27

## 2019-02-13 RX ADMIN — OXYCODONE HYDROCHLORIDE 5 MG: 5 TABLET ORAL at 16:10

## 2019-02-13 RX ADMIN — SODIUM CHLORIDE 125 ML/HR: 900 INJECTION, SOLUTION INTRAVENOUS at 20:57

## 2019-02-13 RX ADMIN — OXYCODONE HYDROCHLORIDE 5 MG: 5 TABLET ORAL at 13:14

## 2019-02-13 RX ADMIN — SODIUM CHLORIDE, SODIUM LACTATE, POTASSIUM CHLORIDE, AND CALCIUM CHLORIDE 25 ML/HR: 600; 310; 30; 20 INJECTION, SOLUTION INTRAVENOUS at 06:54

## 2019-02-13 RX ADMIN — CARVEDILOL 12.5 MG: 3.12 TABLET, FILM COATED ORAL at 17:22

## 2019-02-13 RX ADMIN — POLYETHYLENE GLYCOL 3350 17 G: 17 POWDER, FOR SOLUTION ORAL at 12:21

## 2019-02-13 RX ADMIN — ATORVASTATIN CALCIUM 40 MG: 10 TABLET, FILM COATED ORAL at 20:55

## 2019-02-13 RX ADMIN — PROPOFOL 200 MG: 10 INJECTION, EMULSION INTRAVENOUS at 08:16

## 2019-02-13 RX ADMIN — ONDANSETRON 4 MG: 2 INJECTION INTRAMUSCULAR; INTRAVENOUS at 08:26

## 2019-02-13 RX ADMIN — SODIUM CHLORIDE 125 ML/HR: 900 INJECTION, SOLUTION INTRAVENOUS at 10:35

## 2019-02-13 RX ADMIN — FENTANYL CITRATE 25 MCG: 50 INJECTION, SOLUTION INTRAMUSCULAR; INTRAVENOUS at 09:53

## 2019-02-13 RX ADMIN — HYDROMORPHONE HYDROCHLORIDE 0.5 MG: 2 INJECTION, SOLUTION INTRAMUSCULAR; INTRAVENOUS; SUBCUTANEOUS at 08:35

## 2019-02-13 RX ADMIN — Medication 10 ML: at 17:38

## 2019-02-13 RX ADMIN — FAMOTIDINE 20 MG: 20 TABLET ORAL at 12:19

## 2019-02-13 RX ADMIN — NEOSTIGMINE METHYLSULFATE 4 MG: 1 INJECTION INTRAVENOUS at 09:16

## 2019-02-13 RX ADMIN — ACETAMINOPHEN 650 MG: 325 TABLET ORAL at 23:14

## 2019-02-13 RX ADMIN — MIDAZOLAM HYDROCHLORIDE 2 MG: 1 INJECTION, SOLUTION INTRAMUSCULAR; INTRAVENOUS at 08:10

## 2019-02-13 RX ADMIN — FENTANYL CITRATE 25 MCG: 50 INJECTION, SOLUTION INTRAMUSCULAR; INTRAVENOUS at 09:48

## 2019-02-13 RX ADMIN — FENTANYL CITRATE 25 MCG: 50 INJECTION, SOLUTION INTRAMUSCULAR; INTRAVENOUS at 09:58

## 2019-02-13 RX ADMIN — TRANEXAMIC ACID 1000 MG: 1 INJECTION, SOLUTION INTRAVENOUS at 10:35

## 2019-02-13 RX ADMIN — ACETAMINOPHEN 1000 MG: 500 TABLET ORAL at 06:54

## 2019-02-13 RX ADMIN — LIDOCAINE HYDROCHLORIDE 80 MG: 20 INJECTION, SOLUTION EPIDURAL; INFILTRATION; INTRACAUDAL; PERINEURAL at 08:16

## 2019-02-13 RX ADMIN — KETOROLAC TROMETHAMINE 15 MG: 30 INJECTION, SOLUTION INTRAMUSCULAR at 17:23

## 2019-02-13 RX ADMIN — Medication 2 G: at 08:23

## 2019-02-13 RX ADMIN — HYDROMORPHONE HYDROCHLORIDE 0.5 MG: 2 INJECTION, SOLUTION INTRAMUSCULAR; INTRAVENOUS; SUBCUTANEOUS at 08:24

## 2019-02-13 RX ADMIN — DEXAMETHASONE SODIUM PHOSPHATE 8 MG: 4 INJECTION, SOLUTION INTRA-ARTICULAR; INTRALESIONAL; INTRAMUSCULAR; INTRAVENOUS; SOFT TISSUE at 08:26

## 2019-02-13 RX ADMIN — PREGABALIN 150 MG: 75 CAPSULE ORAL at 06:54

## 2019-02-13 RX ADMIN — ROCURONIUM BROMIDE 30 MG: 10 INJECTION, SOLUTION INTRAVENOUS at 08:22

## 2019-02-13 RX ADMIN — OXYCODONE HYDROCHLORIDE 5 MG: 5 TABLET ORAL at 22:55

## 2019-02-13 RX ADMIN — OXYCODONE HYDROCHLORIDE 5 MG: 5 TABLET ORAL at 10:45

## 2019-02-13 RX ADMIN — ASPIRIN 81 MG 81 MG: 81 TABLET ORAL at 17:21

## 2019-02-13 RX ADMIN — Medication 2 G: at 23:14

## 2019-02-13 RX ADMIN — SENNOSIDES AND DOCUSATE SODIUM 1 TABLET: 8.6; 5 TABLET ORAL at 12:19

## 2019-02-13 RX ADMIN — FENTANYL CITRATE 50 MCG: 50 INJECTION, SOLUTION INTRAMUSCULAR; INTRAVENOUS at 09:27

## 2019-02-13 RX ADMIN — SENNOSIDES AND DOCUSATE SODIUM 1 TABLET: 8.6; 5 TABLET ORAL at 17:21

## 2019-02-13 RX ADMIN — KETOROLAC TROMETHAMINE 15 MG: 30 INJECTION, SOLUTION INTRAMUSCULAR at 23:14

## 2019-02-13 RX ADMIN — HYDROMORPHONE HYDROCHLORIDE 0.5 MG: 2 INJECTION, SOLUTION INTRAMUSCULAR; INTRAVENOUS; SUBCUTANEOUS at 09:16

## 2019-02-13 RX ADMIN — FENTANYL CITRATE 100 MCG: 50 INJECTION, SOLUTION INTRAMUSCULAR; INTRAVENOUS at 08:14

## 2019-02-13 RX ADMIN — FENTANYL CITRATE 25 MCG: 50 INJECTION, SOLUTION INTRAMUSCULAR; INTRAVENOUS at 09:43

## 2019-02-13 RX ADMIN — CELECOXIB 400 MG: 200 CAPSULE ORAL at 06:54

## 2019-02-13 RX ADMIN — Medication 2 G: at 16:05

## 2019-02-13 RX ADMIN — HYDROMORPHONE HYDROCHLORIDE 0.5 MG: 2 INJECTION, SOLUTION INTRAMUSCULAR; INTRAVENOUS; SUBCUTANEOUS at 08:57

## 2019-02-13 RX ADMIN — OXYCODONE HYDROCHLORIDE 5 MG: 5 TABLET ORAL at 20:00

## 2019-02-13 RX ADMIN — FENTANYL CITRATE 50 MCG: 50 INJECTION, SOLUTION INTRAMUSCULAR; INTRAVENOUS at 09:32

## 2019-02-13 RX ADMIN — FAMOTIDINE 20 MG: 20 TABLET ORAL at 17:21

## 2019-02-13 RX ADMIN — LATANOPROST 1 DROP: 50 SOLUTION OPHTHALMIC at 20:01

## 2019-02-13 RX ADMIN — GLYCOPYRROLATE 0.7 MG: 0.2 INJECTION INTRAMUSCULAR; INTRAVENOUS at 09:16

## 2019-02-13 RX ADMIN — SUCCINYLCHOLINE CHLORIDE 160 MG: 20 INJECTION INTRAMUSCULAR; INTRAVENOUS at 08:16

## 2019-02-13 NOTE — PROGRESS NOTES
Occupational Therapy Orders received and medical record reviewed. Will follow up on POD 1 to initiate OT Evaluation per guideline. Thank you.

## 2019-02-13 NOTE — ANESTHESIA POSTPROCEDURE EVALUATION
Procedure(s): LEFT TOTAL HIP ARTHROPLASTY ANTERIOR APPROACH WITH NAVIGATION. Anesthesia Post Evaluation Patient location during evaluation: PACU Note status: Adequate. Level of consciousness: responsive to verbal stimuli and sleepy but conscious Pain management: satisfactory to patient Airway patency: patent Anesthetic complications: no 
Cardiovascular status: acceptable Respiratory status: acceptable Hydration status: acceptable Comments: +Post-Anesthesia Evaluation and Assessment Patient: Dory Lopez MRN: [id number]  SSN: [id number] YOB: 1955  Age: 70 y.o. Sex: male I have evaluated the patient and the patient is stable and ready to be discharged from PACU . Cardiovascular Function/Vital Signs /72 (BP 1 Location: Right arm, BP Patient Position: At rest)   Pulse 67   Temp 36.7 °C (98.1 °F)   Resp 16   Ht 5' 10\" (1.778 m)   Wt 76 kg (167 lb 8.8 oz)   SpO2 97%   BMI 24.04 kg/m² Patient is status post Procedure(s): LEFT TOTAL HIP ARTHROPLASTY ANTERIOR APPROACH WITH NAVIGATION. Nausea/Vomiting: Controlled. Postoperative hydration reviewed and adequate. Pain: 
Pain Scale 1: Numeric (0 - 10) (02/13/19 1045) Pain Intensity 1: 5 (02/13/19 1045) Managed. Neurological Status:  
Neuro (WDL): Exceptions to WDL (02/13/19 0935) At baseline. Mental Status and Level of Consciousness: Arousable. Pulmonary Status:  
O2 Device: Nasal cannula (02/13/19 1045) Adequate oxygenation and airway patent. Complications related to anesthesia: None Post-anesthesia assessment completed. No concerns. Signed By: Aida Gustafson MD  
 2/13/2019 Visit Vitals /72 (BP 1 Location: Right arm, BP Patient Position: At rest) Pulse 67 Temp 36.7 °C (98.1 °F) Resp 16 Ht 5' 10\" (1.778 m) Wt 76 kg (167 lb 8.8 oz) SpO2 97% BMI 24.04 kg/m²

## 2019-02-13 NOTE — PROGRESS NOTES
Orthopedic End of Shift Note Bedside and Verbal shift change report given to [facility] (oncoming nurse) by Ag Shoemaker (offgoing nurse). Report included the following information SBAR, Kardex, Intake/Output, MAR and Recent Results. POD# Significant issues during shift:  
 
Issues for Physician to address Nausea/Vomiting [] yes [] no Peace Catheter [] in [] removed n/a Bowel Movements [] yes [x] no Foot Pumps or SCD [x] yes [] no Ice Pack [x] yes    [] no Incentive Spirometer [x] yes [] no Volume:   [address] [employer] Monitoring   [] yes [x] no Rhythm:  
Supplemental O2 [x] yes [] no Sat off O2:   99 Patient Vitals for the past 12 hrs: 
 Temp Pulse Resp BP SpO2  
02/13/19 1430 97.9 °F (36.6 °C) 81 18 112/59 96 % 02/13/19 1410  84  108/69   
02/13/19 1405  80  125/66   
02/13/19 1401 98 °F (36.7 °C) 66 18 120/65 96 % 02/13/19 1314 97.9 °F (36.6 °C) 70 18 133/70 99 % 02/13/19 1203 97.3 °F (36.3 °C) 69 17 121/69 99 % 02/13/19 1122 97.6 °F (36.4 °C) 67 18 140/74 99 % 02/13/19 1100 97.7 °F (36.5 °C) 66 17 116/66 96 % 02/13/19 1045  67 16 108/72 97 % 02/13/19 1030  66 14 117/63 98 % 02/13/19 1015  67 18 122/70 98 % 02/13/19 1000  66 19 113/70 95 % 02/13/19 0950  67 14 125/67 97 % 02/13/19 0945  69 14 115/70 96 % 02/13/19 0940  66 12 113/64 99 % 02/13/19 0935  71 12 122/59 94 % 02/13/19 0933 98.1 °F (36.7 °C) 86 11 112/68 95 % 02/13/19 0630 98.1 °F (36.7 °C) 60 15 150/77 100 % Lab Results Component Value Date/Time HGB 13.3 02/05/2019 11:07 AM  
 HCT 38.4 02/05/2019 11:07 AM  
 
Lab Results Component Value Date/Time INR 1.1 02/05/2019 11:07 AM  
 
 
Intake/Output Summary (Last 24 hours) at 2/13/2019 1654 Last data filed at 2/13/2019 1500 Gross per 24 hour Intake 2672.08 ml Output 200 ml Net 2472.08 ml Wound Hip Left (Active) Dressing Status  Clean, dry, and intact 2/13/2019 11:00 AM  
 Dressing Type  Topical skin adhesive/glue; Other (Comment) 2/13/2019 11:00 AM  
Incision site well approximated? Yes 2/13/2019  9:07 AM  
Drainage Amount  None 2/13/2019 11:00 AM  
Periwound Skin Condition Intact 2/13/2019 11:00 AM  
Number of days: 0 Peripheral Intravenous Line: 
Peripheral IV 02/13/19 Left;Posterior Hand (Active) Site Assessment Clean, dry, & intact 2/13/2019 11:00 AM  
Phlebitis Assessment 0 2/13/2019 11:00 AM  
Infiltration Assessment 0 2/13/2019 11:00 AM  
Dressing Status Clean, dry, & intact 2/13/2019 11:00 AM  
Dressing Type Transparent;Tape 2/13/2019 11:00 AM  
Hub Color/Line Status Pink; Infusing 2/13/2019 11:00 AM  
 
Drain(s):

## 2019-02-13 NOTE — PROGRESS NOTES
Reason for Admission: Left Hip Osteoarthritis RRAT Score: 7 Plan for utilizing home health:  [facility], At [address] [place] [address], [facility] Likelihood of Readmission:  low Transition of Care Plan:                   
CM made room visit with patient who was alert and oriented. Pt confirmed demographics (physical address: [address] [place] [address], [place] [address]), emergency contact, insurance, and PCP (last seen 1 mo ago). Pt and spouse live in single level home with 5 entry steps. Pt has DME including forearm crutches and RW. Prior to admission patient was independent with ADLs, IADLs, and driving. Pt denied any history with HH, SNF, or IPR. Pt spouse to transport pt home upon d/c.  
 
CM discussed therapy's recommendations with pt ([place]). Pt agreeable and chose [facility] first [facility], At [address] [facility], and [facility]. CM has sent referral to [facility] and is waiting for response. CM will continue to follow. Care Management Interventions PCP Verified by CM: Yes(last seen about 1 mo ago) Mode of Transport at Discharge: Other (see comment)(pt's spouse to transport pt home upon d/c) Transition of Care Consult (CM Consult): Discharge Planning, Home Health Discharge Durable Medical Equipment: No(has forearm crutches and RW) Physical Therapy Consult: Yes Occupational Therapy Consult: Yes Speech Therapy Consult: No 
Current Support Network: Lives with Spouse, Own Home, Family Lives Nearby(pt and spouse live in single level home with 5 entry steps ) Confirm Follow Up Transport: Family Plan discussed with Pt/Family/Caregiver: Yes Freedom of Choice Offered: Yes Discharge Location Discharge Placement: Home with home health [facility] Law, [address]

## 2019-02-13 NOTE — OP NOTES
Russ Whitehead MD - Adult Reconstruction and Total Joint Replacement    Genesis Almonte - MRN [id number] - : 1955 (70 y.o.)      Date: 2019    Pre-operative Diagnosis: Left Hip DJD     Post-operative Diagnosis: same     Procedure:  (1) Left Total Hip Arthroplasty, Direct Anterior Approach    (2) Intraoperative Fluoroscopy    (3) Imageless Computer Navigation     Implants Used:   Implant Name Type Inv. Item Serial No.  Lot No. LRB No. Used Action   LINER ACET NEUT 36MM GRP 3 -- NOVATION - [id number]  LINER ACET NEUT 36MM GRP 3 -- NOVATION [id number] [employer] NA Left 1 Implanted   SHELL ACET M-HLE 56MM GRA -- INTEGRIP REV - [id number]  SHELL ACET M-HLE 56MM GRA -- INTEGRIP REV [id number] [employer] NA Left 1 Implanted   Alteon BONE SCREW 6.5mm DIAMETER X 30mm LENGTH    [id number]  NA Left 1 Implanted   STEM TAPR SO HA  SZ 12 -- NOVATION - [id number]  STEM TAPR SO HA  SZ 12 -- NOVATION [id number] [employer] NA Left 1 Implanted   HEAD FEM CER 36MM OD +0MM -- BIOLOX DELTA - [id number]  HEAD FEM CER 36MM OD +0MM -- BIOLOX DELTA [id number] [employer] NA Left 1 Implanted       Anesthesia: GETA + local    Surgeon: Russ Whitehead     Assist: SARAHI Centeno (Performing all or most of the following assistant-at-surgery services including but not limited to: proper patient positioning, sterile/prep and draping, placement of instruments/trackers, operative exposure, minor portions of bone / soft tissue excision, final irrigation and debridement, deep and superficial closure, application of final dressings)    EBL: 250cc     Drains: none     Specimens: none     Complications: none     Condition: stable to PACU     Brief History: Longstanding hip pain, unresponsive to conservative treatment. The risks, benefits, and alternatives to total hip replacement were thoroughly explained. The patient understood no guarantees could be given about the outcome of the procedure and wished to proceed. Description of Procedure: After being identified in the preoperative holding area and having their operative site marked, the patient was brought back to the operating room where they underwent anesthesia to good effect. They were  placed in the supine position with a bump under the hip. The operative extremity was then prepped and draped in the usual fashion using sterile technique. Preoperative antibiotics had been administered. An appropriate time-out was performed. We began by placing the pelvic tracker with two percutaneous Shanz pins into the ipsilateral ilium. The pelvis was then registered with the navigation system. An incision was made 2 fingerbreadths lateral and distal to the ASIS. The skin flaps were developed down to the tensor fascia. This was divided sharply. Blunt dissection was taken medially over the tensor muscle. Retractors were placed superior and inferior to the femoral neck. The anterior fat pad was debrided. Circumflex vessels were identified and cauterized. A provisional neck cut was performed. The head was removed from the acetabulum. We then excised the labrum. We sequentially reamed for the acetabular shell. This was placed in the appropriate abduction and version. Position was confirmed by navigation and fluoroscopy . The liner was then impacted into the locking mechanism. We then turned our attention to the femur. Elevators were used to gain access to the proximal femur. Soft tissue releases were performed as necessary. The lateralizer was utilized. We then sequentially broached up to the final size trial. We placed a trial neck and head and had excellent restoration of leg length and offset with good soft tissue balance. We selected these as our final implants. Final components were inserted and the hip was reduced after thorough lavage. Restoration of appropriate leg length was confirmed by navigation and fluoroscopy. We again irrigated. The tensor fascia was closed. Periarticular injection was performed. Skin closure was performed in layers. A sterile dressing was applied. The patient was awakened, moved to the stretcher, and taken to the recovery room in stable condition. At the conclusion of the procedure, all counts were correct. There were no immediate complications. Additional Procedure:   Date: 2/13/2019    Preoperative diagnosis: LEFT HIP OA    Postoperative diagnosis: LEFT HIP OA    Procedure performed: Procedure(s):  LEFT TOTAL HIP ARTHROPLASTY ANTERIOR APPROACH WITH NAVIGATION    Anesthesia: General    Surgeon(s) and Role:     Sourav Darby MD - Primary    Assistant: SARAHI Peralta      This describes the use of intraoperative fluoroscopy. Fluoroscopic imaging was utilized in orthogonal planes as well as using live technique for all phases of the procedure, described separately in the operative report, including hardware placement.     Trinity Anton MD

## 2019-02-13 NOTE — PROGRESS NOTES
Ortho/ NeuroSurgery NP Note POD# 0  s/p LEFT TOTAL HIP ARTHROPLASTY ANTERIOR APPROACH WITH NAVIGATION Pt resting in bed. No complaints. Pain 4-5/10 presently. VSS Afebrile. Patient has had something to eat/drink. No nausea. Most Recent Labs:  
Lab Results Component Value Date/Time HGB 13.3 02/05/2019 11:07 AM  
 Hemoglobin A1c 5.4 02/05/2019 11:07 AM  
 
 
Body mass index is 24.04 kg/m². Reference: BMI greater than 30 is classified as obesity and greater than 40 is classified as morbid obesity. STOP BANG Score: 3 Voiding status: Patient needs to void today. Dressing c.d.i Calves soft and supple; No pain with passive stretch Sensation and motor intact SCDs for mechanical DVT proph Plan: 
1) PT BID starting today 2) Sophia-op Antibiotics Ancef 3) Aspirin 81 mg PO BID for DVT Prophylaxis 4) Pepcid for GI Prophylaxis 5) Discharge plans to home with his wife. Readiness for discharge: 
   [x] Vital Signs stable  
 [] Hgb stable  
 [] + Voiding  
 [x] Incision intact, drainage minimal  
 [x] Tolerating PO intake   
 [] Cleared by PT (OT if applicable) [x] Adequate pain control on oral medication alone Ava Mcnair NP

## 2019-02-13 NOTE — H&P
Date of Surgery Update: 
Andree Hull was seen and examined on the day of surgery prior to the procedure. There were no significant clinical changes since the completion of the History and Physical. 
 
Exam today prior to surgery showed no acute cardiac findings, no respiratory difficulty, and no abdominal complaints or pain. This patient is a candidate for TXA. Documentation of Medical Necessity: 
 
Symptoms: pain with activity and at rest, antalgia, interferes with ADLs Conservative Treatment: activity modification, multiple medications, injection Physical Findings: painful AROM/PROM, antalgia on ambulation, no trochanteric pain Imaging: significant OA, sclerosis and osteophytes Indications:  
Failure of conservative treatments with daily pain and functional limitations. Appropriate imaging demonstrating significant disease. Appropriate physical findings consistent with significant degenerative joint disease. All pertinent risks, benefits, and alternatives to operative management including continued conservative care were explained at length. The patient has elected to proceed with appropriately indicated and medically necessary total joint arthroplasty. They understand no guarantees can be given about the outcome. Signed By: SARAHI Castaneda  February 13, 2019 8:10 AM

## 2019-02-13 NOTE — PROGRESS NOTES
Problem: Mobility Impaired (Adult and Pediatric) Goal: *Acute Goals and Plan of Care (Insert Text) Physical Therapy Goals Initiated 2/13/2019 1. Patient will move from supine to sit and sit to supine  in bed with modified independence within 7 day(s). 2.  Patient will transfer from bed to chair and chair to bed with modified independence using the least restrictive device within 7 day(s). 3.  Patient will perform sit to stand with modified independence within 7 day(s). 4.  Patient will ambulate with modified independence for 250 feet with the least restrictive device within 7 day(s). 5.  Patient will ascend/descend 4 stairs with 1 handrail(s) with modified independence within 7 day(s). physical Therapy EVALUATION Patient: Daljit Abraham (52 y.o. male) Date: 2/13/2019 Primary Diagnosis: S/P hip replacement [Z96.649] Procedure(s) (LRB): LEFT TOTAL HIP ARTHROPLASTY ANTERIOR APPROACH WITH NAVIGATION (Left) Day of Surgery Precautions:   Fall, WBAT 
 
ASSESSMENT : 
Based on the objective data described below, the patient presents with  decreased independence with mobility compared to baseline level prior to admission due to decreased strength and ROM. Patient cleared by nursing for mobility and agreeable to participate in POD #0 mobility. Patient's vital signs within normal limits. Patient able to perform bed mobility and achieve EOB sitting with Alicja. Patient performed sit<>stand tx with CGA and demonstrates even WB bilaterally and good immediate standing balance. Patient able to ambulate with CGA and RW x 125 feet. Patient returned to sitting and supine position in bed. Patient reportedly attended pre operative joint replacement education class and has binder present. Physical therapist reviewed bed exercises and acute care expectations with patient. Patient able to correctly perform ankle pumps, heel slides, and quad sets and has no additional questions.  Patient has equipment needed for d/c. Will continue to follow to progress towards acute care goals. Recommend HHPT at d/c to continue to optimize independence with mobility. Patient will benefit from skilled intervention to address the above impairments. Patients rehabilitation potential is considered to be Good Factors which may influence rehabilitation potential include:  
[x]         None noted 
[]         Mental ability/status []         Medical condition 
[]         Home/family situation and support systems 
[]         Safety awareness 
[]         Pain tolerance/management 
[]         Other: PLAN : 
Recommendations and Planned Interventions: 
[x]           Bed Mobility Training             [x]    Neuromuscular Re-Education 
[x]           Transfer Training                   []    Orthotic/Prosthetic Training 
[x]           Gait Training                         []    Modalities [x]           Therapeutic Exercises           []    Edema Management/Control 
[x]           Therapeutic Activities            [x]    Patient and Family Training/Education 
[]           Other (comment): Frequency/Duration: Patient will be followed by physical therapy  twice daily to address goals. Discharge Recommendations: Home Health Further Equipment Recommendations for Discharge: none-owns crutches and RW SUBJECTIVE:  
Patient stated I really don't think I can go home today. I am just too nervous about it all.  OBJECTIVE DATA SUMMARY:  
HISTORY:   
Past Medical History:  
Diagnosis Date  Arthritis   
 hip  Hypertension  Ill-defined condition Glaucoma Bilat  Other ill-defined conditions(799.89)   
 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  
Procedure Laterality Date  COLONOSCOPY N/A 10/24/2016 COLONOSCOPY performed by Marisel Garner MD at [facility] ENDOSCOPY  COLONOSCOPY,DIAGNOSTIC  10/24/2016  HX HEENT  4-13-15  LARYNGOSCOPY, BX and removal x 2  
  HX HERNIA REPAIR    
 2 sgys, 1 each side as infant and teenager  HX OTHER SURGICAL    
 colonoscopy/polyp  HX TONSILLECTOMY  DC COLSC FLX W/RMVL OF TUMOR POLYP LESION SNARE TQ  4/19/2012 Prior Level of Function/Home Situation: Independent with mobility at baseline. Lives with wife who can assist as needed Personal factors and/or comorbidities impacting plan of care:  
 
Home Situation Home Environment: Private residence # Steps to Enter: 4 Rails to Enter: No 
Wheelchair Ramp: No 
One/Two Story Residence: One story Living Alone: No 
Support Systems: Spouse/Significant Other/Partner Patient Expects to be Discharged to[de-identified] Private residence Current DME Used/Available at Home: Crutches Tub or Shower Type: Shower EXAMINATION/PRESENTATION/DECISION MAKING: Critical Behavior: 
Neurologic State: Alert, Appropriate for age Orientation Level: Appropriate for age, Oriented X4 Cognition: Appropriate decision making, Follows commands Hearing: Auditory Auditory Impairment: None Hearing Aids/Status: Does not own Range Of Motion: 
AROM: Generally decreased, functional 
  
  
  
  
  
  
  
Strength:   
Strength: Generally decreased, functional 
  
  
  
Functional Mobility: 
Bed Mobility: 
  
Supine to Sit: Minimum assistance;Assist x1 Sit to Supine: Minimum assistance;Assist x1 Scooting: Contact guard assistance Transfers: 
Sit to Stand: Contact guard assistance Stand to Sit: Contact guard assistance Balance:  
Sitting: Intact Standing: Intact; With supportAmbulation/Gait Training:Distance (ft): 125 Feet (ft) Assistive Device: Gait belt;Walker, rolling Ambulation - Level of Assistance: Contact guard assistance Gait Description (WDL): Exceptions to [facility] Gait Abnormalities: Antalgic;Decreased step clearance; Step to gait(able to progress to step through) Base of Support: Narrowed Stance: Left decreased Speed/Jessica: Pace decreased (<100 feet/min); Slow Step Length: Left shortened;Right shortened Functional Measure: 
Barthel Index: 
 
Bathin Bladder: 10 Bowels: 10 
Groomin Dressin Feeding: 10 Mobility: 10 Stairs: 5 Toilet Use: 5 Transfer (Bed to Chair and Back): 10 Total: 75 The Barthel ADL Index: Guidelines 1. The index should be used as a record of what a patient does, not as a record of what a patient could do. 2. The main aim is to establish degree of independence from any help, physical or verbal, however minor and for whatever reason. 3. The need for supervision renders the patient not independent. 4. A patient's performance should be established using the best available evidence. Asking the patient, friends/relatives and nurses are the usual sources, but direct observation and common sense are also important. However direct testing is not needed. 5. Usually the patient's performance over the preceding 24-48 hours is important, but occasionally longer periods will be relevant. 6. Middle categories imply that the patient supplies over 50 per cent of the effort. 7. Use of aids to be independent is allowed. Kevin Sanchez., Barthel, D.W. (9302). Functional evaluation: the Barthel Index. [address] [facility] (14)2. KATY Armijo, [place]., Paras Corbin., Rina, [address] [place] [address] (). Measuring the change indisability after inpatient rehabilitation; comparison of the responsiveness of the Barthel Index and Functional [place] Measure. Journal of Neurology, Neurosurgery, and Psychiatry, 66(4), 272-932. Alexus Shukla, N.J.A, UGO Munoz, & Abdirahman De La Torre MGAYLE. (2004.) Assessment of post-stroke quality of life in cost-effectiveness studies: The usefulness of the Barthel Index and the EuroQoL-5D. [facility], 13, 534-18 Pain: 
Pain Scale 1: Numeric (0 - 10) Pain Intensity 1: 5 Pain Location 1: Hip Pain Orientation 1: Left Pain Description 1: Aching Pain Intervention(s) 1: Medication (see MAR) Activity Tolerance: VSS Please refer to the flowsheet for vital signs taken during this treatment. After treatment:  
[]         Patient left in no apparent distress sitting up in chair 
[x]         Patient left in no apparent distress in bed 
[x]         Call bell left within reach [x]         Nursing notified 
[x]         Caregiver present 
[]         Bed alarm activated COMMUNICATION/EDUCATION:  
The patients plan of care was discussed with: Physical Therapist, Occupational Therapist and Registered Nurse. [x]         Fall prevention education was provided and the patient/caregiver indicated understanding. [x]         Patient/family have participated as able in goal setting and plan of care. [x]         Patient/family agree to work toward stated goals and plan of care. []         Patient understands intent and goals of therapy, but is neutral about his/her participation. []         Patient is unable to participate in goal setting and plan of care. Thank you for this referral. 
Sushma Mills, PT, DPT Time Calculation: 20 mins

## 2019-02-13 NOTE — ANESTHESIA PREPROCEDURE EVALUATION
Anesthetic History No history of anesthetic complications Review of Systems / Medical History Patient summary reviewed, nursing notes reviewed and pertinent labs reviewed Pulmonary Within defined limits Neuro/Psych Within defined limits Cardiovascular Hypertension Exercise tolerance: >4 METS 
  
GI/Hepatic/Renal 
Within defined limits Endo/Other Arthritis Other Findings Comments: vc tumor Physical Exam 
 
Airway Mallampati: II 
TM Distance: 4 - 6 cm Neck ROM: normal range of motion Mouth opening: Normal 
 
 Cardiovascular Regular rate and rhythm,  S1 and S2 normal,  no murmur, click, rub, or gallop Dental 
 
Dentition: Poor dentition Pulmonary Breath sounds clear to auscultation Abdominal 
GI exam deferred Other Findings Anesthetic Plan ASA: 2 Anesthesia type: general 
 
 
 
 
Induction: Intravenous Anesthetic plan and risks discussed with: Patient

## 2019-02-13 NOTE — PERIOP NOTES
Handoff Report from Operating Room to PACU Report received from [facility] and [facility] regarding [facility]. Surgeon(s): 
Speedy Segovia MD  And Procedure(s) (LRB): LEFT TOTAL HIP ARTHROPLASTY ANTERIOR APPROACH WITH NAVIGATION (Left)  confirmed  
with allergies and dressings discussed. Anesthesia type, drugs, patient history, complications, estimated blood loss, vital signs, intake and output, and last pain medication, lines and temperature were reviewed. 1100: TRANSFER - OUT REPORT: 
 
Verbal report given to Khurram Villavicencio (name) on [facility]  being transferred to Ortho (unit) for routine post - op Report consisted of patients Situation, Background, Assessment and  
Recommendations(SBAR). Information from the following report(s) SBAR, Kardex, Intake/Output, MAR and Med Rec Status was reviewed with the receiving nurse. Lines:  
Peripheral IV 02/13/19 Left;Posterior Hand (Active) Site Assessment Clean, dry, & intact 2/13/2019 11:00 AM  
Phlebitis Assessment 0 2/13/2019 11:00 AM  
Infiltration Assessment 0 2/13/2019 11:00 AM  
Dressing Status Clean, dry, & intact 2/13/2019 11:00 AM  
Dressing Type Transparent;Tape 2/13/2019 11:00 AM  
Hub Color/Line Status Pink; Infusing 2/13/2019 11:00 AM  
  
 
Opportunity for questions and clarification was provided. Patient transported with: 
 O2 @ 2 liters Tech  
 
Gave  volunteer patients room number to give to patients wife

## 2019-02-13 NOTE — PERIOP NOTES
Patient: Genesis Almonte MRN: [id number]  SSN: [id number] YOB: 1955  Age: 70 y.o. Sex: male Patient is status post Procedure(s): LEFT TOTAL HIP ARTHROPLASTY ANTERIOR APPROACH WITH NAVIGATION. Surgeon(s) and Role: 
   Heidi Shields MD - Primary Local/Dose/Irrigation:  See [facility] Peripheral IV 02/13/19 Left;Posterior Hand (Active) Site Assessment Clean, dry, & intact 2/13/2019  6:54 AM  
Phlebitis Assessment 0 2/13/2019  6:54 AM  
Infiltration Assessment 0 2/13/2019  6:54 AM  
Dressing Status Clean, dry, & intact 2/13/2019  6:54 AM  
Dressing Type Tape;Transparent 2/13/2019  6:54 AM  
Hub Color/Line Status Pink; Infusing 2/13/2019  6:54 AM  
        
Airway - Endotracheal Tube 02/13/19 Oral (Active) Dressing/Packing:  Wound Hip Left-Dressing Type : Topical skin adhesive/glue; Other (Comment)(Exofin and Honeycomb dressing) (02/13/19 0907) Other:  none

## 2019-02-13 NOTE — DISCHARGE INSTRUCTIONS
[facility]  Surgery: Total Hip Replacement  Surgeon:   Yoel Garcia MD  Surgery Date:  2/13/2019     To relieve pain:   Use ice/gel packs.  Put the ice pack directly over the wound, or anywhere you are hurting or swollen.  To control pain and swelling, keep ice on regularly, especially after physical activity.  The packs should stay cold for 3-4 hours. When it is not cold anymore, rotate with the packs in the freezer.  Elevate your leg. This will also keep swelling down.  Rest for at least 20 minutes between activity or exercises.  To keep track of your pain medications, write down what you take and when you take it.  The last dose of pain medication you got in the hospital was:       Medication    Dose    Date & Time           Choose your medications based on the pain scale below:     To keep your pain under control, take Tylenol every 6 hours for 14 days - even if you feel like you dont need it.  For mild to moderate pain (4-6 on pain scale), take one pain pill every 3-4 hours as needed.  For severe pain (7-10 on pain scale), take two pain pills every 3-4 hours as needed.  To prevent nausea, take your pain medications with food. Pain Scale              As your pain lessens:     Slowly start taking less pain medication. You may do this by waiting longer between doses or by taking smaller doses.  Stop using the pain medications as soon as you no longer need it, usually in 2-3 weeks.  Aspirin   To prevent blood clots, you will need to take Aspirin 81 mg twice a day for 30 days.  To prevent stomach upset or bleeding:   Do not take non-steroidal anti-inflammatory medications (Ibuprofen, Advil, Motrin, Naproxen, etc.)    Take Pepcid 20 mg twice a day, or a similar home medication, while you are taking a blood thinner.              OPSITE (Honeycomb dressing)     Keep your clear, waterproof dressing in place for 5-7 days after your leave the hospital.     If you are still having drainage, you will need to change your dressing in 5-7 days. You will be given one extra dressing to use at home.  If there is no more drainage from the wound, you may leave it open to the air. OPSITE DRESSING INSTRUCTIONS                  PRINEO DRESSING INSTRUCTIONS     Prineo is a type of skin glue and mesh that is keeping your wound together.  Leave this covering alone. Do not peel it off.  Do not apply oils or lotions over it.  You may shower with this dressing but do not soak it. Gently pat your wound dry when you get out of the shower.  DO NOTs:   Do not rub your surgical wound   Do not put lotion or oils on your wound.  Do not take a tub bath or go swimming until your doctor says it is ok.  You may shower with this dressing over your wound.  After showering pat the dressing dry.  If you have staples a home health nurse will remove them in about 10 days.  To increase and promote healing:     Stop Smoking (or at least cut back on       Smoking).  Eat a well-balanced diet (high in protein       and vitamin C).  If you have a poor appetite, drink Ensure, Glucerna, or [place] Instant Breakfast for the next 30 days.  If you are diabetic, you should control your blood         sugars to prevent infection and help your wound         to heal.                         To prevent constipation, stay active and drink plenty of fluid.  While using pain medications, you should also take stool softeners and laxatives, such as Pericolace       and Miralax.  If you are having too many bowel       Movements, then you may need       to stop taking the laxatives.      You should have a bowel movement 3-4 days        after surgery and then at least every other day while       on pain medication.  To improve your recovery, you must be active!  Use your walker and take short walks         (in your home). about every 2 hours during the day.  Try to increase how far you walk each day.  You can put as much weight on your leg as you can tolerate while walking. [facility] physical therapy will come to your       home the day after you leave the hospital.  The       therapist will visit about 4 times over the next couple of       weeks to teach you how to get out of bed, to safely walk       in your home, and to do your exercises.  NO DRIVING until your surgeon tells you it is ok.  You can return to work when cleared by a physician.  Please call your physician immediately if you have:     Constant bleeding from your wound.  Increasing redness or swelling around your wound (Some warmth, bruising and swelling is normal).  Change in wound drainage (increase in amount, color, or bad smell).  Change in mental status (unusual behavior   Temperature over 101.5 degrees Fahrenheit      Pain or redness in the calf (back of your lower leg - see picture)     Increased swelling of the thigh, ankle, calf, or foot.  Emergency: CALL 911 if you have:     Shortness of breath     Chest pain when you cough or taking a deep breath     Please call your surgeons office at [phone] for a follow up appointment. _   You should call as soon as you get home or the next day after discharge. Ask to make an appointment in 2 weeks.  If you have questions or concerns during normal business hours, you may reach Dr. Della Spears' Team at [phone].

## 2019-02-14 ENCOUNTER — HOME HEALTH ADMISSION (OUTPATIENT)
Dept: HOME HEALTH SERVICES | Facility: HOME HEALTH | Age: 64
End: 2019-02-14
Payer: MEDICARE

## 2019-02-14 VITALS
OXYGEN SATURATION: 96 % | HEART RATE: 77 BPM | SYSTOLIC BLOOD PRESSURE: 134 MMHG | HEIGHT: 70 IN | TEMPERATURE: 98.1 F | RESPIRATION RATE: 18 BRPM | WEIGHT: 167.55 LBS | DIASTOLIC BLOOD PRESSURE: 77 MMHG | BODY MASS INDEX: 23.99 KG/M2

## 2019-02-14 LAB
ANION GAP SERPL CALC-SCNC: 8 MMOL/L (ref 5–15)
BUN SERPL-MCNC: 10 MG/DL (ref 6–20)
BUN/CREAT SERPL: 13 (ref 12–20)
CALCIUM SERPL-MCNC: 8.5 MG/DL (ref 8.5–10.1)
CHLORIDE SERPL-SCNC: 104 MMOL/L (ref 97–108)
CO2 SERPL-SCNC: 22 MMOL/L (ref 21–32)
CREAT SERPL-MCNC: 0.78 MG/DL (ref 0.7–1.3)
GLUCOSE SERPL-MCNC: 162 MG/DL (ref 65–100)
HGB BLD-MCNC: 11.2 G/DL (ref 12.1–17)
POTASSIUM SERPL-SCNC: 3.8 MMOL/L (ref 3.5–5.1)
SODIUM SERPL-SCNC: 134 MMOL/L (ref 136–145)

## 2019-02-14 PROCEDURE — 97116 GAIT TRAINING THERAPY: CPT

## 2019-02-14 PROCEDURE — 97165 OT EVAL LOW COMPLEX 30 MIN: CPT

## 2019-02-14 PROCEDURE — 97530 THERAPEUTIC ACTIVITIES: CPT

## 2019-02-14 PROCEDURE — 74011250636 HC RX REV CODE- 250/636: Performed by: PHYSICIAN ASSISTANT

## 2019-02-14 PROCEDURE — 80048 BASIC METABOLIC PNL TOTAL CA: CPT

## 2019-02-14 PROCEDURE — 85018 HEMOGLOBIN: CPT

## 2019-02-14 PROCEDURE — 74011250637 HC RX REV CODE- 250/637: Performed by: PHYSICIAN ASSISTANT

## 2019-02-14 PROCEDURE — 36415 COLL VENOUS BLD VENIPUNCTURE: CPT

## 2019-02-14 PROCEDURE — 97535 SELF CARE MNGMENT TRAINING: CPT

## 2019-02-14 RX ADMIN — Medication 10 ML: at 06:48

## 2019-02-14 RX ADMIN — FAMOTIDINE 20 MG: 20 TABLET ORAL at 08:29

## 2019-02-14 RX ADMIN — ACETAMINOPHEN 650 MG: 325 TABLET ORAL at 06:47

## 2019-02-14 RX ADMIN — ASPIRIN 81 MG 81 MG: 81 TABLET ORAL at 08:29

## 2019-02-14 RX ADMIN — OXYCODONE HYDROCHLORIDE 10 MG: 5 TABLET ORAL at 08:28

## 2019-02-14 RX ADMIN — OXYCODONE HYDROCHLORIDE 10 MG: 5 TABLET ORAL at 03:51

## 2019-02-14 RX ADMIN — DOXAZOSIN 4 MG: 2 TABLET ORAL at 08:29

## 2019-02-14 RX ADMIN — POLYETHYLENE GLYCOL 3350 17 G: 17 POWDER, FOR SOLUTION ORAL at 08:27

## 2019-02-14 RX ADMIN — CARVEDILOL 12.5 MG: 3.12 TABLET, FILM COATED ORAL at 08:28

## 2019-02-14 RX ADMIN — KETOROLAC TROMETHAMINE 15 MG: 30 INJECTION, SOLUTION INTRAMUSCULAR at 06:48

## 2019-02-14 RX ADMIN — ACETAMINOPHEN 650 MG: 325 TABLET ORAL at 11:30

## 2019-02-14 RX ADMIN — DEXAMETHASONE SODIUM PHOSPHATE 10 MG: 4 INJECTION, SOLUTION INTRA-ARTICULAR; INTRALESIONAL; INTRAMUSCULAR; INTRAVENOUS; SOFT TISSUE at 08:30

## 2019-02-14 RX ADMIN — ATORVASTATIN CALCIUM 40 MG: 10 TABLET, FILM COATED ORAL at 08:28

## 2019-02-14 RX ADMIN — OXYCODONE HYDROCHLORIDE 5 MG: 5 TABLET ORAL at 11:30

## 2019-02-14 RX ADMIN — SENNOSIDES AND DOCUSATE SODIUM 1 TABLET: 8.6; 5 TABLET ORAL at 08:28

## 2019-02-14 NOTE — PROGRESS NOTES
[facility] has accepted patient for [facility]. CM has updated AVS and made patient aware. CM has completed needs of patient at this time. Care Management Interventions PCP Verified by CM: Yes(last seen about 1 mo ago) Mode of Transport at Discharge: Other (see comment)(pt's spouse to transport pt home upon d/c) Transition of Care Consult (CM Consult): Discharge Planning, Home Health [address]: Yes Discharge Durable Medical Equipment: No(has forearm crutches and RW) Physical Therapy Consult: Yes Occupational Therapy Consult: Yes Speech Therapy Consult: No 
Current Support Network: Lives with Spouse, Own Home, Family Lives Nearby(pt and spouse live in single level home with 5 entry steps ) Confirm Follow Up Transport: Family Plan discussed with Pt/Family/Caregiver: Yes Freedom of Choice Offered: Yes Discharge Location Discharge Placement: Home with home health(bs) [place], [address]

## 2019-02-14 NOTE — PROGRESS NOTES
Problem: Falls - Risk of 
Goal: *Absence of Falls Document Greg De La Paz Fall Risk and appropriate interventions in the flowsheet. Outcome: Progressing Towards Goal 
Fall Risk Interventions: 
Mobility Interventions: OT consult for ADLs, Patient to call before getting OOB, PT Consult for mobility concerns, PT Consult for assist device competence Mentation Interventions: Door open when patient unattended Medication Interventions: Patient to call before getting OOB Elimination Interventions: Call light in reach, Patient to call for help with toileting needs History of Falls Interventions: Door open when patient unattended

## 2019-02-14 NOTE — PROGRESS NOTES
Ortho / Neurosurgery NP Note POD# 1 s/p LEFT TOTAL HIP ARTHROPLASTY ANTERIOR APPROACH WITH NAVIGATION Pt seen with Arlen Narayan. Pt sitting up in chair No complaints of nausea, dizziness, numbness or tingling sensations. Minimal pain with ambulation. Dressing CDI. Minimal swelling noted. He is voiding and passing flatus. VSS Afebrile. Voiding status: voids Labs Lab Results Component Value Date/Time HGB 11.2 (L) 02/14/2019 03:54 AM  
  
Lab Results Component Value Date/Time INR 1.1 02/05/2019 11:07 AM  
  
 
Body mass index is 24.04 kg/m². : A BMI > 30 is classified as obesity and > 40 is classified as morbid obesity. Dressing c.d.i Cryotherapy in place over incision Calves soft and supple; No pain with passive stretch Sensation and motor intact SCDs for mechanical DVT proph while in bed PLAN: 
1) PT BID 
2) Aspirin 81 mg PO BID for DVT Prophylaxis 3) GI Prophylaxis - pepcid 4) Readniess for discharge: 
   [x] Vital Signs stable  
 [x] Hgb stable  
 [x] + Voiding  
 [x] Wound intact, drainage minimal  
 [x] Tolerating PO intake [x] Cleared by PT (OT if applicable) [x] Stair training completed (if applicable) [x] Independent / Contact Guard Assist (household distance) [x] Bed mobility [x] Car transfers  
  [x] ADLs [x] Adequate pain control on oral medication alone Plan Discharge home today Brianna VILLALBA student

## 2019-02-14 NOTE — PROGRESS NOTES
Bedside and Verbal shift change report given to [address] (oncoming nurse) by Julia Louise RN (offgoing nurse). Report included the following information SBAR, Kardex, OR Summary, Procedure Summary, Intake/Output, MAR and Recent Results.

## 2019-02-14 NOTE — PROGRESS NOTES
Occupational Therapy EVALUATION/discharge Patient: Daljit Abraham (86 y.o. male) Date: 2/14/2019 Primary Diagnosis: S/P hip replacement [Z96.649] Procedure(s) (LRB): LEFT TOTAL HIP ARTHROPLASTY ANTERIOR APPROACH WITH NAVIGATION (Left) 1 Day Post-Op Precautions:  Fall, WBAT 
 
ASSESSMENT:  
Based on the objective data described below, the patient presents with mildly decreased functional activity tolerance secondary to L. THR. Cleared for OT by nursing. Patient A&Ox4 on arrival. Required Supervision for sit<>stand/stand<>sit transfers and with bathroom mobility due to stiffness and hesitancy with movement of LLE. Demonstrated ability to safely transfer on/off standard commode with Supervision for transfer aspect. Completed UB dressing with Morton prior to start of session. Educated pt. and demonstrated use of each item in hip kit per patient request at onset of session. Patient was able to complete most aspects of LB dressing safely w/o AD, yet through trial determined patient will benefit from use of use of sock aide (for L. Foot) and long handled shoe horn for other shoes he typically wears during the day d/t pain and limited trunk flexion in L. Hip. Otherwise patient is performing his dynamic ADL/self-care routine with Mod I for increased time and adhering to anterior hip precautions. Further skilled acute occupational therapy is not indicated at this time. Discharge Recommendations: Home with family support Further Equipment Recommendations for Discharge: Sock aide and long handled shoe horn SUBJECTIVE:  
Patient stated It feels like a sharp knife\" referring to pain in L. Hip following ambulation in PT. OBJECTIVE DATA SUMMARY:  
HISTORY:  
Past Medical History:  
Diagnosis Date  Arthritis   
 hip  Hypertension  Ill-defined condition Glaucoma Bilat  Other ill-defined conditions(095.63)   
 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  
Procedure Laterality Date  COLONOSCOPY N/A 10/24/2016 COL[facility]COPY performed by Katy [facility] at [facility] ENDOSCOPY  COLONOSCOPY,DIAGNOSTIC  10/24/2016  HX HEENT  4-13-15 LARYNGOSCOPY, BX and removal x 2  
 HX HERNIA REPAIR    
 2 sgys, 1 each side as infant and teenager  HX OTHER SURGICAL    
 colonoscopy/polyp  HX TONSILLECTOMY  VT COLSC FLX W/RMVL OF TUMOR POLYP LESION SNARE TQ  4/19/2012 Prior Level of Function/Environment/Context:  Independent with mobility at baseline. Lives with wife who can assist as needed. Home Situation Home Environment: Private residence # Steps to Enter: 4 Rails to Enter: No 
Wheelchair Ramp: No 
One/Two Story Residence: One story Living Alone: No 
Support Systems: Spouse/Significant Other/Partner Patient Expects to be Discharged to[de-identified] Private residence Current DME Used/Available at Home: Crutches Tub or Shower Type: Shower Hand dominance: Right EXAMINATION OF PERFORMANCE DEFICITS: 
Cognitive/Behavioral Status: 
Neurologic State: Alert Orientation Level: Oriented X4 Cognition: Follows commands; Appropriate decision making Perception: Appears intact Perseveration: No perseveration noted Safety/Judgement: Good awareness of safety precautions; Awareness of environment Edema: At incision site only Hearing: Auditory Auditory Impairment: None Hearing Aids/Status: Does not own Vision/Perceptual:   
 
Acuity: Within Defined Limits Range of Motion: 
 
AROM: Within functional limits PROM: Within functional limits Strength: 
Strength: Within functional limits Coordination: 
Coordination: Within functional limits Fine Motor Skills-Upper: Left Intact; Right Intact Gross Motor Skills-Upper: Left Intact; Right Intact Tone & Sensation: 
 
Tone: Normal 
Sensation: Intact Balance: 
Sitting: Intact Standing: Intact; Without support Functional Mobility and Transfers for ADLs:Transfers: 
Sit to Stand: Supervision(d/t stiffness and hesitation with movement) Stand to Sit: Supervision(d/t stiffness and hesitation with movement) Bathroom Mobility: Supervision/set up Toilet Transfer : Supervision(d/t stiffness and hesitation with movement) ADL Assessment: 
Feeding: Independent Oral Facial Hygiene/Grooming: Independent Bathing: Supervision(for safety getting into/out of shower; limited endurance) Upper Body Dressing: Independent Lower Body Dressing: Modified independent(using sock aide and long handled shoe horn) Toileting: Modified independent ADL Intervention and task modifications: 
  
 
Grooming Grooming Assistance: Independent Upper Body Bathing Bathing Assistance: Independent Lower Body Bathing Bathing Assistance: Modified independent(using long handled sponge for LLE/buttock area) Upper Body Dressing Assistance Dressing Assistance: Independent Lower Body Dressing Assistance Dressing Assistance: Modified independent(using sock aide and long handled shoe horn) Underpants: Modified independent Pants With Elastic Waist: Modified independent Socks: Modified independent Toileting Toileting Assistance: Supervision/set up;Modified independent(Spv for sit<>stand transfer yet I/Mod I with other aspects) Bladder Hygiene: Independent Bowel Hygiene: Independent Clothing Management: Modified independent Cues: (verbal cues for maintaining ant. hip precautions) Cognitive Retraining Safety/Judgement: Good awareness of safety precautions; Awareness of environment Bathing: Patient instructed when bathing to not submerge wound in water, stand to shower or sponge bathe, cover wound with plastic and tape to ensure no water reaches bandage/wound without cues. Patient indicated understanding.  
 
Dressing joint: Patient instructed and demonstrated to don/doff Left LE first.  Patient instructed and demonstrated to don all clothing while sitting prior to standing, doff all clothing to knees while standing, then sit to doff clothing off from knees to feet in order to facilitate fall prevention, pain management, and energy conservation. Home safety: Patient issued Home Safety Checklist with recommendations to remove throw/scatter rugs and clear pathways for safe ambulation upon return home. Patient instructed on home modifications and safety (raise height of ADL objects, appropriate height of chair surfaces, recliner safety, change of floor surfaces, clear pathways) to increase independence and fall prevention. Patient indicated understanding. Standing: Patient instructed and demonstrated to walk up to sink/counter top/surfaces and fall prevention. Patient educated about hip anatomy verbally and educated to avoid internal rotation of Left LE. Patient instructed to increase amount of time standing, observe standing position during ADLs in order to increase even weight bearing through bilateral LEs in order to increase independence with ADLs. Patient indicated understanding. Functional Measure: 
 
Barthel Index: 
 
Bathin Bladder: 10 Bowels: 10 
Groomin Dressing: 10 Feeding: 10 Mobility: 5 Stairs: 10 Toilet Use: 10 Transfer (Bed to Chair and Back): 15 Total: 90 The Barthel ADL Index: Guidelines 1. The index should be used as a record of what a patient does, not as a record of what a patient could do. 2. The main aim is to establish degree of independence from any help, physical or verbal, however minor and for whatever reason. 3. The need for supervision renders the patient not independent. 4. A patient's performance should be established using the best available evidence. Asking the patient, friends/relatives and nurses are the usual sources, but direct observation and common sense are also important. However direct testing is not needed. 5. Usually the patient's performance over the preceding 24-48 hours is important, but occasionally longer periods will be relevant. 6. Middle categories imply that the patient supplies over 50 per cent of the effort. 7. Use of aids to be independent is allowed. Jeannine Levin., Barthel, D.W. (7902). Functional evaluation: the Barthel Index. [facility] (14)2. KATY Montejo, Moises Herndon., Joana Osler., [place], [address] [place] [address] (1999). Measuring the change indisability after inpatient rehabilitation; comparison of the responsiveness of the Barthel Index and Functional [place] Measure. Journal of Neurology, Neurosurgery, and Psychiatry, 66(4), 776-809. Blanca Jin, BASHIR.[email or web address].[email or web address], UGO Munoz, & Princess Jack MGAYLE. (2004.) Assessment of post-stroke quality of life in cost-effectiveness studies: The usefulness of the Barthel Index and the EuroQoL-5D. [facility], 13, 199-97 Occupational Therapy Evaluation Charge Determination History Examination Decision-Making LOW Complexity : Brief history review  LOW Complexity : 1-3 performance deficits relating to physical, cognitive , or psychosocial skils that result in activity limitations and / or participation restrictions  LOW Complexity : No comorbidities that affect functional and no verbal or physical assistance needed to complete eval tasks Based on the above components, the patient evaluation is determined to be of the following complexity level: LOW Pain: 
Pain Scale 1: Numeric (0 - 10) Pain Intensity 1: 7 Pain Location 1: Hip Pain Orientation 1: Left Pain Description 1: Aching Activity Tolerance:  
See assessment above. Please refer to the flowsheet for vital signs taken during this treatment. After treatment:  
[x]  Patient left in no apparent distress sitting up in chair 
[]  Patient left in no apparent distress in bed 
[x]  Call bell left within reach 
[]  Nursing notified 
[x]  Caregiver present []  Bed alarm activated COMMUNICATION/EDUCATION:  
Communication/Collaboration: 
[x]      Home safety education was provided and the patient/caregiver indicated understanding. [x]      Patient/family have participated as able and agree with findings and recommendations. []      Patient is unable to participate in plan of care at this time. Findings and recommendations were discussed with: Patient and spouse Brian Wharton M.S., OTR/L Time Calculation: 25 mins

## 2019-02-14 NOTE — PROGRESS NOTES
Problem: Mobility Impaired (Adult and Pediatric) Goal: *Acute Goals and Plan of Care (Insert Text) Physical Therapy Goals Initiated 2/13/2019 1. Patient will move from supine to sit and sit to supine  in bed with modified independence within 7 day(s). 2.  Patient will transfer from bed to chair and chair to bed with modified independence using the least restrictive device within 7 day(s). 3.  Patient will perform sit to stand with modified independence within 7 day(s). 4.  Patient will ambulate with modified independence for 250 feet with the least restrictive device within 7 day(s). 5.  Patient will ascend/descend 4 stairs with 1 handrail(s) with modified independence within 7 day(s). physical Therapy TREATMENT Patient: Ada Chow (28 y.o. male) Date: 2/14/2019 Diagnosis: S/P hip replacement [Z96.649] Status post left hip replacement Procedure(s) (LRB): LEFT TOTAL HIP ARTHROPLASTY ANTERIOR APPROACH WITH NAVIGATION (Left) 1 Day Post-Op Precautions: Fall, WBAT Chart, physical therapy assessment, plan of care and goals were reviewed. ASSESSMENT: 
Pt cleared by nurse to mobilize. Pt received in bed supine. Pt agreeable to therapy. Pt performed all bed mobility independently. Pt performed sit to stand transfer at supervision. Pt ambulated 200ft with RW at 200ft with forearm crutches. Pt requiring cueing for sequencing with crutches. Pt moving very well just with complaints of burning in incision area. Pts pain improved with mobility. Pt ascended and descended 4 steps with both railing at [facility]/SBA with ceuing for sequencing. Pt performed a car transfer at [facility] with cueing for sequencing. Pt independent on all exercises and educated on continue to performed exercises  throughout the day. Pt moving well with no safety concerns. Pt left up in chair with all needs met and ice in place. From physical therapy stand point pt cleared for discharge. Pt will benefit from HHPT to improve strength. Progression toward goals: 
[x]      Improving appropriately and progressing toward goals 
[]      Improving slowly and progressing toward goals 
[]      Not making progress toward goals and plan of care will be adjusted PLAN: 
Patient continues to benefit from skilled intervention to address the above impairments. Continue treatment per established plan of care. Discharge Recommendations:  Home Health Further Equipment Recommendations for Discharge:  None SUBJECTIVE:  
Patient stated I hope I can be playing golf by MAy.  OBJECTIVE DATA SUMMARY:  
Critical Behavior: 
Neurologic State: Alert Orientation Level: Oriented X4 Cognition: Follows commands, Appropriate decision making Safety/Judgement: Good awareness of safety precautions, Awareness of environment Functional Mobility Training: 
Bed Mobility: 
  
Supine to Sit: Independent Scooting: Independent Transfers: 
Sit to Stand: Supervision Stand to Sit: Modified independent Balance: 
Sitting: Intact Standing: Intact Standing - Static: Good Standing - Dynamic : GoodAmbulation/Gait Training: 
Distance (ft): 400 Feet (ft) Assistive Device: Gait belt;Walker, rolling;Crutches Ambulation - Level of Assistance: Contact guard assistance;Modified independent Gait Abnormalities: Decreased step clearance Base of Support: Narrowed Speed/Jessica: Pace decreased (<100 feet/min) Step Length: Right shortened;Left shortened Stairs: 
Number of Stairs Trained: 4 Stairs - Level of Assistance: Stand-by assistance Rail Use: Both Therapeutic Exercises:  
SUPINE 
EXERCISES Sets Reps Active Active Assist  
Passive Self ROM Comments Ankle Pumps 1 10 [x]                                        []                                        []                                        []                                          
 Quad Sets 1 5 [x]                                        []                                        []                                        []                                          
Heel Slides   []                                        []                                        []                                        []                                          
Hip Abduction   []                                        []                                        []                                        []                                          
Glut Sets 1 5 [x]                                        []                                        []                                        []                                          
Seated marching  1 5 [x]                                        []                                        []                                        []                                          
   []                                        []                                        []                                        []                                          
Pain: 
Pain Scale 1: Numeric (0 - 10) Pain Intensity 1: 0 Pain Location 1: Hip Pain Orientation 1: Left Pain Description 1: Aching Pain Intervention(s) 1: Ice Activity Tolerance:  
Pt moving well with no safety concerns. After treatment:  
[x] Patient left in no apparent distress sitting up in chair 
[] Patient left in no apparent distress in bed 
[x] Call bell left within reach [x] Nursing notified 
[x] Caregiver present 
[] Bed alarm activated COMMUNICATION/COLLABORATION:  
The patients plan of care was discussed with: Registered Nurse Sherree Holter Time Calculation: 23 mins

## 2019-02-14 NOTE — PROGRESS NOTES
Orthopedic End of Shift Note Bedside and Verbal shift change report given to JAYMIE Tovar (oncoming nurse) by Lynn Rahman (offgoing nurse). Report included the following information SBAR, Kardex, OR Summary, Procedure Summary, Intake/Output, MAR, Recent Results and Med Rec Status. POD# 1 Significant issues during shift:  
 
Issues for Physician to address Nausea/Vomiting [] yes [x] no Peace Catheter [] in [x] removed Bowel Movements [] yes [x] no Foot Pumps or SCD [x] yes [] no Ice Pack [x] yes    [] no Incentive Spirometer [x] yes [] no Volume:     
Telementary Monitoring   [] yes [x] no Rhythm:  
Supplemental O2 [] yes [x] no Sat off O2:   98% Patient Vitals for the past 12 hrs: 
 Temp Pulse Resp BP SpO2  
02/14/19 0348 98.1 °F (36.7 °C) 72 16 128/62 98 % 02/13/19 2313 98.2 °F (36.8 °C) 64 18 127/63 98 % 02/13/19 2103 98.3 °F (36.8 °C) 73 18 126/71 93 % Lab Results Component Value Date/Time HGB 11.2 (L) 02/14/2019 03:54 AM  
 HCT 38.4 02/05/2019 11:07 AM  
 
Lab Results Component Value Date/Time INR 1.1 02/05/2019 11:07 AM  
 
 
Intake/Output Summary (Last 24 hours) at 2/14/2019 0756 Last data filed at 2/13/2019 2313 Gross per 24 hour Intake 4560.42 ml Output 1102 ml Net 3458.42 ml Wound Hip Left (Active) Dressing Status  Clean, dry, and intact 2/13/2019 11:13 PM  
Dressing Type  Transparent film 2/13/2019 11:13 PM  
Incision site well approximated? Yes 2/13/2019 11:13 PM  
Drainage Amount  None 2/13/2019 11:13 PM  
Periwound Skin Condition Intact 2/13/2019 11:00 AM  
Number of days: 1 Peripheral Intravenous Line: 
Peripheral IV 02/13/19 Left;Posterior Hand (Active) Site Assessment Clean, dry, & intact 2/13/2019 11:13 PM  
Phlebitis Assessment 0 2/13/2019 11:13 PM  
Infiltration Assessment 0 2/13/2019 11:13 PM  
Dressing Status Clean, dry, & intact 2/13/2019 11:13 PM  
Dressing Type Tape;Transparent 2/13/2019 11:13 PM  
 Hub Color/Line Status Pink 2/13/2019 11:13 PM  
 
Drain(s):

## 2019-02-14 NOTE — DISCHARGE SUMMARY
Ortho Discharge Summary    Patient ID:  Ellyn Goltz  [id number]  male  70 y.o.  1955    Admit date: 2/13/2019    Discharge date: 2/14/2019    Admitting Physician: Antwan Huffman MD     Consulting Physician(s):   Treatment Team: Utilization Review: Gloria Stanley RN; Nurse Practitioner: Renzo Diallo NP; Care Manager: Kennedy Ann; Nurse Practitioner: Queen Jorge NP    Date of Surgery:   2/13/2019     Preoperative Diagnosis:  LEFT HIP OA    Postoperative Diagnosis:   LEFT HIP OA    Procedure(s):     LEFT TOTAL HIP ARTHROPLASTY ANTERIOR APPROACH WITH NAVIGATION     Anesthesia Type:   General     Surgeon: Leanna Hankins MD                            HPI:  Pt is a 70 y.o. male who has a history of LEFT HIP OA  with pain and limitations of activities of daily living who presents at this time for a left WILLIAM following the failure of conservative management. 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 : A BMI > 30 is classified as obesity and > 40 is classified as morbid obesity. Medications upon admission :   Prior to Admission Medications   Prescriptions Last Dose Informant Patient Reported? Taking? AMLODIPINE BESYLATE (AMLODIPINE PO) 2/7/2019  Yes No   Sig: Take 10 mg by mouth every morning. CARVEDILOL (COREG PO) 2/13/2019 at 0430  Yes Yes   Sig: Take 12.5 mg by mouth two (2) times a day. DORZOLAMIDE HCL/TIMOLOL MALEAT (COSOPT OP) 2/13/2019 at 0430  Yes Yes   Sig: Apply 2 Drops to eye two (2) times a day. GLUC/VERNELL-MSM#1/C/CASSIE/BROCK/BOR (OSTEO BI-FLEX PO) 2/7/2019  Yes No   Sig: Take 1 Tab by mouth every evening.    HYDROcodone-acetaminophen (NORCO) 7.5-325 mg per tablet 2/12/2019 at 0700  Yes No   Sig: Take 1 Tab by mouth as needed for Pain. bimatoprost (LUMIGAN) 0.01 % ophthalmic drops 2/12/2019 at 1930   Yes Yes   Sig: Administer 1 Drop to both eyes every evening. diclofenac EC (VOLTAREN) 75 mg EC tablet 2/7/2019  Yes No   Sig: Take 75 mg by mouth two (2) times a day. diclofenac EC (VOLTAREN) 75 mg EC tablet   Yes Yes   Sig: Take 1 Tab by mouth two (2) times a day. Hold for two weeks. doxazosin (CARDURA) 4 mg tablet 2/7/2019  Yes No   Sig: Take 4 mg by mouth daily. ergocalciferol (VITAMIN D2) 50,000 unit capsule 2/7/2019  Yes No   Sig: Take 50,000 Units by mouth every seven (7) days. Tuesdays   ferrous sulfate (IRON) 325 mg (65 mg iron) tablet 2/7/2019  Yes No   Sig: Take 65 mg by mouth Daily (before breakfast). fish oil-dha-epa 1,200-144-216 mg cap 2/7/2019  Yes No   Sig: Take 2 Tabs by mouth two (2) times a day. fish oil-dha-epa 1,200-144-216 mg cap   Yes Yes   Sig: Take 2 Tabs by mouth two (2) times a day. Hold for two weeks. multivitamin (ONE A DAY) tablet 2/7/2019  Yes No   Sig: Take 1 Tab by mouth daily. rosuvastatin (CRESTOR) 40 mg tablet 2/13/2019 at 0430  Yes No   Sig: Take 20 mg by mouth two (2) times a day. Facility-Administered Medications: None        Allergies: Allergies   Allergen Reactions    Ace Inhibitors Shortness of Breath and Swelling    Bee Sting [Sting, Bee] Shortness of Breath and Swelling    Sulfa (Sulfonamide Antibiotics) Other (comments)     As infant- unknown reaction        Hospital Course: The patient underwent surgery. Complications:  None; patient tolerated the procedure well. Was taken to the PACU in stable condition and then transferred to the ortho floor.       Perioperative Antibiotics:  Ancef     Postoperative Pain Management:  Oxycodone      DVT Prophylaxis: Aspirin 81    Postoperative transfusions:    Number of units banked PRBCs =   none     Post Op complications: none    Hemoglobin at discharge:    Lab Results   Component Value Date/Time    HGB 11.2 (L) 02/14/2019 03:54 AM    INR 1.1 02/05/2019 11:07 AM       Dressing was changed on POD # 1. Incision - clean, dry and intact. No significant erythema or swelling. Neurovascular exam found to be within normal limits. Wound appears to be healing without any evidence of infection. Physical Therapy started on the day following surgery and participated in bed mobility, transfers and ambulation. Gait:  Gait  Base of Support: Narrowed  Speed/Jessica: Pace decreased (<100 feet/min)  Step Length: Right shortened, Left shortened  Stance: Left decreased  Gait Abnormalities: Decreased step clearance  Ambulation - Level of Assistance: Contact guard assistance, Modified independent  Distance (ft): 400 Feet (ft)  Assistive Device: Gait belt, Walker, rolling, Crutches  Rail Use: Both  Stairs - Level of Assistance: Stand-by assistance  Number of Stairs Trained: 4                   Discharged to: Home with Home Health. Condition on Discharge:   stable    Discharge instructions:  - Anticoagulate with Aspirin 81 BID  - Take pain medications as prescribed  - Resume pre hospital diet      - Discharge activity: activity as tolerated  - Ambulate with assistive device as needed. - Weight bearing status WBAT  - Wound Care Keep wound clean and dry. See discharge instruction sheet. -DISCHARGE MEDICATION LIST     Discharge Medication List as of 2/14/2019 10:53 AM      START taking these medications    Details   acetaminophen (TYLENOL) 325 mg tablet Take 2 Tabs by mouth every six (6) hours for 14 days. , Print, Disp-112 Tab, R-0      aspirin 81 mg chewable tablet Take 1 Tab by mouth two (2) times a day for 30 days. , Print, Disp-60 Tab, R-0      famotidine (PEPCID) 20 mg tablet Take 1 Tab by mouth two (2) times a day for 30 days. , Print, Disp-60 Tab, R-0      oxyCODONE IR (ROXICODONE) 5 mg immediate release tablet Take 1-2 Tabs by mouth every four (4) hours as needed.  Max Daily Amount: 60 mg., Print, Disp-80 Tab, R-0 polyethylene glycol (MIRALAX) 17 gram/dose powder Take 17 g by mouth daily for 15 days. , Print, Disp-255 g, R-0      senna-docusate (SENNA PLUS) 8.6-50 mg per tablet Take 1 Tab by mouth two (2) times a day., Print, Disp-60 Tab, R-0         CONTINUE these medications which have CHANGED    Details   diclofenac EC (VOLTAREN) 75 mg EC tablet Take 1 Tab by mouth two (2) times a day. Hold for two weeks. , Historical Med      fish oil-dha-epa 1,200-144-216 mg cap Take 2 Tabs by mouth two (2) times a day. Hold for two weeks. , Historical Med         CONTINUE these medications which have NOT CHANGED    Details   bimatoprost (LUMIGAN) 0.01 % ophthalmic drops Administer 1 Drop to both eyes every evening., Historical Med      CARVEDILOL (COREG PO) Take 12.5 mg by mouth two (2) times a day.  , Historical Med      DORZOLAMIDE HCL/TIMOLOL MALEAT (COSOPT OP) Apply 2 Drops to eye two (2) times a day., Historical Med      ergocalciferol (VITAMIN D2) 50,000 unit capsule Take 50,000 Units by mouth every seven (7) days. Tuesdays, Historical Med      ferrous sulfate (IRON) 325 mg (65 mg iron) tablet Take 65 mg by mouth Daily (before breakfast). , Historical Med      rosuvastatin (CRESTOR) 40 mg tablet Take 20 mg by mouth two (2) times a day., Historical Med      doxazosin (CARDURA) 4 mg tablet Take 4 mg by mouth daily. , Historical Med      AMLODIPINE BESYLATE (AMLODIPINE PO) Take 10 mg by mouth every morning.  , Historical Med      multivitamin (ONE A DAY) tablet Take 1 Tab by mouth daily. , Historical Med         STOP taking these medications       HYDROcodone-acetaminophen (NORCO) 7.5-325 mg per tablet Comments:   Reason for Stopping:         GLUC/VERNELL-MSM#1/C/CASSIE/BROCK/BOR (OSTEO BI-FLEX PO) Comments:   Reason for Stopping:            per medical continuation form      -Follow up in office in 2 weeks      Signed:  Nakul Garcia.  Michele Reina, NEENA-BC, ONP-C  Orthopaedic Nurse Practitioner    2/14/2019  1:09 PM

## 2019-02-14 NOTE — PROGRESS NOTES
Discharge instructions discussed with patient and wife. All verbalized questions answered. Patient belongings packed by wife. Discharged to home via private vehicle in stable condition.

## 2019-02-15 ENCOUNTER — HOME CARE VISIT (OUTPATIENT)
Dept: SCHEDULING | Facility: HOME HEALTH | Age: 64
End: 2019-02-15
Payer: MEDICARE

## 2019-02-15 PROCEDURE — 400013 HH SOC

## 2019-02-15 PROCEDURE — G0151 HHCP-SERV OF PT,EA 15 MIN: HCPCS

## 2019-02-15 PROCEDURE — 3331090002 HH PPS REVENUE DEBIT

## 2019-02-15 PROCEDURE — 3331090001 HH PPS REVENUE CREDIT

## 2019-02-16 PROCEDURE — 3331090002 HH PPS REVENUE DEBIT

## 2019-02-16 PROCEDURE — 3331090001 HH PPS REVENUE CREDIT

## 2019-02-17 VITALS
RESPIRATION RATE: 17 BRPM | HEART RATE: 70 BPM | TEMPERATURE: 98 F | DIASTOLIC BLOOD PRESSURE: 63 MMHG | SYSTOLIC BLOOD PRESSURE: 138 MMHG | OXYGEN SATURATION: 98 %

## 2019-02-17 PROCEDURE — 3331090001 HH PPS REVENUE CREDIT

## 2019-02-17 PROCEDURE — 3331090002 HH PPS REVENUE DEBIT

## 2019-02-18 ENCOUNTER — HOME CARE VISIT (OUTPATIENT)
Dept: SCHEDULING | Facility: HOME HEALTH | Age: 64
End: 2019-02-18
Payer: MEDICARE

## 2019-02-18 VITALS
TEMPERATURE: 98 F | SYSTOLIC BLOOD PRESSURE: 132 MMHG | RESPIRATION RATE: 17 BRPM | HEART RATE: 76 BPM | DIASTOLIC BLOOD PRESSURE: 76 MMHG | OXYGEN SATURATION: 98 %

## 2019-02-18 PROCEDURE — 3331090002 HH PPS REVENUE DEBIT

## 2019-02-18 PROCEDURE — G0151 HHCP-SERV OF PT,EA 15 MIN: HCPCS

## 2019-02-18 PROCEDURE — 3331090001 HH PPS REVENUE CREDIT

## 2019-02-19 PROCEDURE — 3331090001 HH PPS REVENUE CREDIT

## 2019-02-19 PROCEDURE — 3331090002 HH PPS REVENUE DEBIT

## 2019-02-20 PROCEDURE — 3331090002 HH PPS REVENUE DEBIT

## 2019-02-20 PROCEDURE — 3331090001 HH PPS REVENUE CREDIT

## 2019-02-21 PROCEDURE — 3331090002 HH PPS REVENUE DEBIT

## 2019-02-21 PROCEDURE — 3331090001 HH PPS REVENUE CREDIT

## 2019-02-22 ENCOUNTER — HOME CARE VISIT (OUTPATIENT)
Dept: SCHEDULING | Facility: HOME HEALTH | Age: 64
End: 2019-02-22
Payer: MEDICARE

## 2019-02-22 VITALS
OXYGEN SATURATION: 98 % | DIASTOLIC BLOOD PRESSURE: 76 MMHG | SYSTOLIC BLOOD PRESSURE: 125 MMHG | HEART RATE: 70 BPM | TEMPERATURE: 98 F | RESPIRATION RATE: 17 BRPM

## 2019-02-22 PROCEDURE — G0151 HHCP-SERV OF PT,EA 15 MIN: HCPCS

## 2019-02-22 PROCEDURE — 3331090002 HH PPS REVENUE DEBIT

## 2019-02-22 PROCEDURE — 3331090001 HH PPS REVENUE CREDIT

## 2019-02-23 PROCEDURE — 3331090002 HH PPS REVENUE DEBIT

## 2019-02-23 PROCEDURE — 3331090001 HH PPS REVENUE CREDIT

## 2019-02-24 PROCEDURE — 3331090001 HH PPS REVENUE CREDIT

## 2019-02-24 PROCEDURE — 3331090002 HH PPS REVENUE DEBIT

## 2019-02-25 ENCOUNTER — HOME CARE VISIT (OUTPATIENT)
Dept: SCHEDULING | Facility: HOME HEALTH | Age: 64
End: 2019-02-25
Payer: MEDICARE

## 2019-02-25 VITALS
SYSTOLIC BLOOD PRESSURE: 125 MMHG | RESPIRATION RATE: 17 BRPM | TEMPERATURE: 98 F | OXYGEN SATURATION: 98 % | HEART RATE: 70 BPM | DIASTOLIC BLOOD PRESSURE: 70 MMHG

## 2019-02-25 PROCEDURE — G0151 HHCP-SERV OF PT,EA 15 MIN: HCPCS

## 2019-02-25 PROCEDURE — 3331090001 HH PPS REVENUE CREDIT

## 2019-02-25 PROCEDURE — 3331090002 HH PPS REVENUE DEBIT

## 2019-02-25 PROCEDURE — 3331090003 HH PPS REVENUE ADJ

## 2019-05-03 ENCOUNTER — HOSPITAL ENCOUNTER (OUTPATIENT)
Dept: CT IMAGING | Age: 64
Discharge: HOME | End: 2019-05-03
Attending: INTERNAL MEDICINE
Payer: MEDICARE

## 2019-05-03 DIAGNOSIS — K92.1 MELENA: ICD-10-CM

## 2019-05-03 DIAGNOSIS — R10.32 LLQ PAIN: ICD-10-CM

## 2019-05-03 PROCEDURE — 74177 CT ABD & PELVIS W/CONTRAST: CPT

## 2019-05-03 PROCEDURE — 74011636320 HC RX REV CODE- 636/320: Performed by: INTERNAL MEDICINE

## 2019-05-03 PROCEDURE — 74011000255 HC RX REV CODE- 255: Performed by: INTERNAL MEDICINE

## 2019-05-03 RX ORDER — SODIUM CHLORIDE 0.9 % (FLUSH) 0.9 %
10 SYRINGE (ML) INJECTION
Status: COMPLETED | OUTPATIENT
Start: 2019-05-03 | End: 2019-05-03

## 2019-05-03 RX ORDER — BARIUM SULFATE 20 MG/ML
900 SUSPENSION ORAL
Status: COMPLETED | OUTPATIENT
Start: 2019-05-03 | End: 2019-05-03

## 2019-05-03 RX ADMIN — IOPAMIDOL 100 ML: 755 INJECTION, SOLUTION INTRAVENOUS at 07:50

## 2019-05-03 RX ADMIN — BARIUM SULFATE 450 ML: 21 SUSPENSION ORAL at 07:50

## 2019-05-03 RX ADMIN — Medication 10 ML: at 07:50

## 2022-03-18 PROBLEM — Z96.642 STATUS POST LEFT HIP REPLACEMENT: Status: ACTIVE | Noted: 2019-02-13

## 2023-08-25 ENCOUNTER — TRANSCRIBE ORDERS (OUTPATIENT)
Facility: HOSPITAL | Age: 68
End: 2023-08-25

## 2023-08-25 DIAGNOSIS — R10.31 BILATERAL LOWER ABDOMINAL PAIN: Primary | ICD-10-CM

## 2023-08-25 DIAGNOSIS — R10.32 BILATERAL LOWER ABDOMINAL PAIN: Primary | ICD-10-CM

## 2023-09-01 ENCOUNTER — HOSPITAL ENCOUNTER (OUTPATIENT)
Facility: HOSPITAL | Age: 68
End: 2023-09-01
Payer: MEDICARE

## 2023-09-01 DIAGNOSIS — R10.32 BILATERAL LOWER ABDOMINAL PAIN: ICD-10-CM

## 2023-09-01 DIAGNOSIS — R10.31 BILATERAL LOWER ABDOMINAL PAIN: ICD-10-CM

## 2023-09-01 LAB — CREAT BLD-MCNC: 0.7 MG/DL (ref 0.6–1.3)

## 2023-09-01 PROCEDURE — 74177 CT ABD & PELVIS W/CONTRAST: CPT

## 2023-09-01 PROCEDURE — 82565 ASSAY OF CREATININE: CPT

## 2023-09-01 PROCEDURE — 6360000004 HC RX CONTRAST MEDICATION: Performed by: NURSE PRACTITIONER

## 2023-09-01 RX ADMIN — IOPAMIDOL 100 ML: 755 INJECTION, SOLUTION INTRAVENOUS at 08:38

## 2023-09-25 ENCOUNTER — TRANSCRIBE ORDERS (OUTPATIENT)
Facility: HOSPITAL | Age: 68
End: 2023-09-25

## 2023-09-25 DIAGNOSIS — R79.89 ELEVATED LFTS: Primary | ICD-10-CM

## 2023-10-04 ENCOUNTER — HOSPITAL ENCOUNTER (OUTPATIENT)
Facility: HOSPITAL | Age: 68
Discharge: HOME | End: 2023-10-07
Payer: MEDICARE

## 2023-10-04 DIAGNOSIS — R79.89 ELEVATED LFTS: ICD-10-CM

## 2023-10-04 PROCEDURE — 76981 USE PARENCHYMA: CPT

## 2024-02-09 RX ORDER — CARVEDILOL 25 MG/1
25 TABLET ORAL 2 TIMES DAILY WITH MEALS
COMMUNITY
Start: 2018-12-26

## 2024-02-09 RX ORDER — ROSUVASTATIN CALCIUM 40 MG/1
20 TABLET, COATED ORAL 2 TIMES DAILY
COMMUNITY

## 2024-02-13 ENCOUNTER — APPOINTMENT (OUTPATIENT)
Facility: HOSPITAL | Age: 69
End: 2024-02-13
Attending: INTERNAL MEDICINE
Payer: MEDICARE

## 2024-02-13 ENCOUNTER — ANESTHESIA (OUTPATIENT)
Facility: HOSPITAL | Age: 69
End: 2024-02-13
Payer: MEDICARE

## 2024-02-13 ENCOUNTER — HOSPITAL ENCOUNTER (OUTPATIENT)
Facility: HOSPITAL | Age: 69
Setting detail: OUTPATIENT SURGERY
Discharge: HOME | End: 2024-02-13
Attending: INTERNAL MEDICINE | Admitting: INTERNAL MEDICINE
Payer: MEDICARE

## 2024-02-13 ENCOUNTER — ANESTHESIA EVENT (OUTPATIENT)
Facility: HOSPITAL | Age: 69
End: 2024-02-13
Payer: MEDICARE

## 2024-02-13 VITALS
RESPIRATION RATE: 15 BRPM | WEIGHT: 176.9 LBS | DIASTOLIC BLOOD PRESSURE: 53 MMHG | OXYGEN SATURATION: 98 % | HEART RATE: 67 BPM | TEMPERATURE: 98 F | HEIGHT: 70 IN | SYSTOLIC BLOOD PRESSURE: 148 MMHG | BODY MASS INDEX: 25.33 KG/M2

## 2024-02-13 PROCEDURE — 74019 RADEX ABDOMEN 2 VIEWS: CPT

## 2024-02-13 PROCEDURE — 3600007512: Performed by: INTERNAL MEDICINE

## 2024-02-13 PROCEDURE — 6360000002 HC RX W HCPCS: Performed by: NURSE ANESTHETIST, CERTIFIED REGISTERED

## 2024-02-13 PROCEDURE — 3700000000 HC ANESTHESIA ATTENDED CARE: Performed by: INTERNAL MEDICINE

## 2024-02-13 PROCEDURE — 3600007502: Performed by: INTERNAL MEDICINE

## 2024-02-13 PROCEDURE — 2500000003 HC RX 250 WO HCPCS: Performed by: NURSE ANESTHETIST, CERTIFIED REGISTERED

## 2024-02-13 PROCEDURE — 2580000003 HC RX 258: Performed by: INTERNAL MEDICINE

## 2024-02-13 PROCEDURE — 3700000001 HC ADD 15 MINUTES (ANESTHESIA): Performed by: INTERNAL MEDICINE

## 2024-02-13 PROCEDURE — 7100000011 HC PHASE II RECOVERY - ADDTL 15 MIN: Performed by: INTERNAL MEDICINE

## 2024-02-13 PROCEDURE — 88305 TISSUE EXAM BY PATHOLOGIST: CPT

## 2024-02-13 PROCEDURE — 7100000010 HC PHASE II RECOVERY - FIRST 15 MIN: Performed by: INTERNAL MEDICINE

## 2024-02-13 PROCEDURE — 2709999900 HC NON-CHARGEABLE SUPPLY: Performed by: INTERNAL MEDICINE

## 2024-02-13 PROCEDURE — 88360 TUMOR IMMUNOHISTOCHEM/MANUAL: CPT

## 2024-02-13 RX ORDER — LIDOCAINE HYDROCHLORIDE 20 MG/ML
INJECTION, SOLUTION EPIDURAL; INFILTRATION; INTRACAUDAL; PERINEURAL PRN
Status: DISCONTINUED | OUTPATIENT
Start: 2024-02-13 | End: 2024-02-13 | Stop reason: SDUPTHER

## 2024-02-13 RX ORDER — SODIUM CHLORIDE 9 MG/ML
25 INJECTION, SOLUTION INTRAVENOUS PRN
Status: DISCONTINUED | OUTPATIENT
Start: 2024-02-13 | End: 2024-02-13 | Stop reason: HOSPADM

## 2024-02-13 RX ORDER — SODIUM CHLORIDE 0.9 % (FLUSH) 0.9 %
5-40 SYRINGE (ML) INJECTION EVERY 12 HOURS SCHEDULED
Status: DISCONTINUED | OUTPATIENT
Start: 2024-02-13 | End: 2024-02-13 | Stop reason: HOSPADM

## 2024-02-13 RX ORDER — SODIUM CHLORIDE 0.9 % (FLUSH) 0.9 %
5-40 SYRINGE (ML) INJECTION PRN
Status: DISCONTINUED | OUTPATIENT
Start: 2024-02-13 | End: 2024-02-13 | Stop reason: HOSPADM

## 2024-02-13 RX ADMIN — PROPOFOL 100 MG: 10 INJECTION, EMULSION INTRAVENOUS at 08:15

## 2024-02-13 RX ADMIN — LIDOCAINE HYDROCHLORIDE 40 MG: 20 INJECTION, SOLUTION EPIDURAL; INFILTRATION; INTRACAUDAL; PERINEURAL at 08:10

## 2024-02-13 RX ADMIN — PROPOFOL 100 MG: 10 INJECTION, EMULSION INTRAVENOUS at 08:10

## 2024-02-13 RX ADMIN — PROPOFOL 100 MG: 10 INJECTION, EMULSION INTRAVENOUS at 08:29

## 2024-02-13 RX ADMIN — SODIUM CHLORIDE 25 ML: 9 INJECTION, SOLUTION INTRAVENOUS at 07:32

## 2024-02-13 RX ADMIN — PROPOFOL 50 MG: 10 INJECTION, EMULSION INTRAVENOUS at 08:34

## 2024-02-13 RX ADMIN — PROPOFOL 100 MG: 10 INJECTION, EMULSION INTRAVENOUS at 08:23

## 2024-02-13 NOTE — H&P
Gastroenterology Outpatient History and Physical    Patient: Dexter NIELSEN Reynold    Physician: Raf Gastelum MD    Chief Complaint: pers hx colon polyps and gen abd pain  History of Present Illness: 69yo M with pers hx colon polyps and gen abd pain.  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      Past Surgical History:   Procedure Laterality Date    COLONOSCOPY N/A 10/24/2016    COLONOSCOPY performed by Raf Gastelum MD at [facility] ENDOSCOPY    COLONOSCOPY,DIAGNOSTIC  10/24/2016         COLSC FLX W/RMVL OF TUMOR POLYP LESION SNARE TQ  2012         HEENT  4-13-15    LARYNGOSCOPY, BX and removal x 2    HERNIA REPAIR      2 sgys, 1 each side as infant and teenager    JOINT REPLACEMENT Left 2020    Hip    OTHER SURGICAL HISTORY      colonoscopy/polyp    TONSILLECTOMY        Social History     Socioeconomic History    Marital status:      Spouse name: None    Number of children: None    Years of education: None    Highest education level: None   Tobacco Use    Smoking status: Former     Current packs/day: 0.00     Types: Cigarettes     Quit date: 2019     Years since quittin.0    Smokeless tobacco: Never    Tobacco comments:     Quit smoking: 3-4 CIGS PER DAY   Substance and Sexual Activity    Alcohol use: Yes     Alcohol/week: 28.0 standard drinks of alcohol    Drug use: Yes     Types: Prescription, OTC      Family History   Problem Relation Age of Onset    No Known Problems Sister     Anesth Problems Neg Hx     No Known Problems Sister     No Known Problems Sister     Osteoarthritis Mother     Glaucoma Father     No Known Problems Sister     Diabetes Father     Hypertension Mother       Patient Active Problem List   Diagnosis    Status post left hip replacement       Allergies:

## 2024-02-13 NOTE — PROGRESS NOTES
TRANSFER - IN REPORT:    Verbal report received from Ashleigh Flaherty  being received from servando for routine progression of patient care      Report consisted of patient's Situation, Background, Assessment and   Recommendations(SBAR).     Information from the following report(s) Nurse Handoff Report was reviewed with the receiving nurse.    Opportunity for questions and clarification was provided.      Assessment completed upon patient's arrival to unit and care assumed.

## 2024-02-13 NOTE — OP NOTE
NAME:  Dexter Flaherty   :   1955   MRN:   [id number]     Date/Time:  2024 8:50 AM    Colonoscopy Operative Report    Procedure Type:   Colonoscopy with polypectomy (cold biopsy), submucosal tattooing with Izabela Ink     Indications:     Personal history of colon polyps (screening only)  Pre-operative Diagnosis: see indication above  Post-operative Diagnosis:  See findings below  :  Raf Gastelum MD  Referring Provider: -Mana Chris APRN - NP    Exam:  Airway: clear, no airway problems anticipated  Heart: RRR, without gallops or rubs  Lungs: clear bilaterally without wheezes, crackles, or rhonchi  Abdomen: soft, nontender, nondistended, bowel sounds present  Mental Status: awake, alert and oriented to person, place and time    Sedation:  MAC anesthesia Propofol 450mg Iv  Procedure Details:  After informed consent was obtained with all risks and benefits of procedure explained and preoperative exam completed, the patient was taken to the endoscopy suite and placed in the left lateral decubitus position.  Upon sequential sedation as per above, a digital rectal exam was performed demonstrating internal hemorrhoids.  The Olympus videocolonoscope  was inserted in the rectum and carefully advanced to the cecum, which was identified by the ileocecal valve and appendiceal orifice.   The quality of preparation was adequate.  The colonoscope was slowly withdrawn with careful evaluation between folds. Retroflexion in the rectum was completed demonstrating internal hemorrhoids.     Findings:     -Circumferential malignant-appearing 3cm long mass at level of hepatic flexure/proximal transverse colon from 72-75cm.  The mass is biopsied and then tattooed with 3 separate 0.5cc-1.0cc aliquots of Izabela Ink, both proximally and distally to identify lesion  -Severe sigmoid diverticulosis  -Small grade 1 internal hemorrhoids    Specimen Removed:  #1 hepatic flexure mass  Complications: None.   EBL:

## 2024-02-13 NOTE — PROGRESS NOTES
ARRIVAL INFORMATION:  Verified patient name and date of birth, scheduled procedure, and informed consent.       GI FOCUSED ASSESSMENT:  Neuro: Awake, alert, oriented x4  Respiratory: even and unlabored   GI: semi-soft and slightly-distended  EKG Rhythm: normal sinus rhythm    Education:Reviewed general discharge instructions and  information.

## 2024-02-13 NOTE — DISCHARGE INSTRUCTIONS
intravenous analgesia / sedation:  Have someone responsible help you with your care  Limit your activities  Do not drive and operate hazardous machinery  Do not make important personal, legal or business decisions  Do not drink alcoholic beverages  If you have not urinated within 8 hours after discharge, please contact your physician  Resume your medications unless otherwise instructed    For 24 hours after general anesthesia or intravenous analgesia / sedation  you may experience:  Drowsiness, dizziness, sleepiness, or confusion  Difficulty remembering or delayed reaction times  Difficulty with your balance, especially while walking, move slowly and carefully, do not make sudden position changes  Difficulty focusing or blurred vision    You may not be aware of slight changes in your behavior and/or your reaction time because of the medication used during and after your procedure.    Report the following to your physician:  Excessive pain, swelling, redness or odor of or around the surgical area  Temperature over 100.5  Nausea and vomiting lasting longer than 4 hours or if unable to take medications  Any signs of decreased circulation or nerve impairment to extremity: change in color, persistent numbness, tingling, coldness or increase pain  Any questions or concerns    IF YOU REPORT TO AN EMERGENCY ROOM, DOCTOR'S OFFICE OR HOSPITAL WITHIN 24 HOURS AFTER YOUR PROCEDURE, BRING THIS SHEET AND YOUR AFTER VISIT SUMMARY WITH YOU AND GIVE IT TO THE PHYSICIAN OR NURSE ATTENDING YOU.

## 2024-02-13 NOTE — PROGRESS NOTES
Endoscopy Case End Note:    Procedure scope was pre-cleaned, per protocol, at bedside by Chayito.      Report received from anesthesia.  See anesthesia flowsheet for intra-procedure vital signs and events.    Belongings returned to patient.

## 2024-02-13 NOTE — ANESTHESIA POSTPROCEDURE EVALUATION
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      Diverticulosis of colon (without mention of hemorrhage)      First degree hemorrhoids      (Personal history of colonic polyps [Z86.010])    Surgeons: Raf Gastelum MD Responsible Provider: Tucker James MD    Anesthesia Type: MAC ASA Status: 2            Anesthesia Type: MAC    Serena Phase I: Serena Score: 10    Serena Phase II: Serena Score: 10    Anesthesia Post Evaluation    Patient location during evaluation: bedside  Patient participation: complete - patient participated  Level of consciousness: awake  Pain score: 0  Airway patency: patent  Nausea & Vomiting: no nausea and no vomiting  Cardiovascular status: blood pressure returned to baseline  Respiratory status: acceptable  Hydration status: euvolemic  Comments: Patient left unit for an xray study due to 8/10 abdominal pain.  Pain management: adequate    No notable events documented.

## 2024-02-13 NOTE — ANESTHESIA PRE PROCEDURE
Department of Anesthesiology  Preprocedure Note       Name:  Dexter Flaherty   Age:  68 y.o.  :  1955                                          MRN:  [id number]         Date:  2024      Surgeon: Surgeon(s):  Raf Gastelum MD    Procedure: Procedure(s):  COLONOSCOPY    Medications prior to admission:   Prior to Admission medications    Medication Sig Start Date End Date Taking? Authorizing Provider   carvedilol (COREG) 25 MG tablet Take 1 tablet by mouth 2 times daily (with meals) 18  Yes ProviderSue MD   AMLODIPINE BESYLATE PO Take 10 mg by mouth every morning    Sue Arambula MD   rosuvastatin (CRESTOR) 40 MG tablet Take 0.5 tablets by mouth 2 times daily    ProviderSue MD       Current medications:    Current Facility-Administered Medications   Medication Dose Route Frequency Provider Last Rate Last Admin   • sodium chloride flush 0.9 % injection 5-40 mL  5-40 mL IntraVENous 2 times per day Raf Gastelum MD       • sodium chloride flush 0.9 % injection 5-40 mL  5-40 mL IntraVENous PRN Raf Gastelum MD       • 0.9 % sodium chloride infusion  25 mL IntraVENous PRN Raf Gastelum  mL/hr at 24 0732 25 mL at 24 0732       Allergies:    Allergies   Allergen Reactions   • Ace Inhibitors Anaphylaxis, Shortness Of Breath and Swelling       Problem List:    Patient Active Problem List   Diagnosis Code   • Status post left hip replacement Z96.642       Past Medical History:        Diagnosis Date   • Arthritis     hip   • Hypertension    • Ill-defined condition     Glaucoma Bilat   • Other ill-defined conditions(799.89)     glaucoma   • Other ill-defined conditions(799.89)     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

## 2024-02-22 ENCOUNTER — OFFICE VISIT (OUTPATIENT)
Age: 69
End: 2024-02-22
Payer: MEDICARE

## 2024-02-22 ENCOUNTER — PREP FOR PROCEDURE (OUTPATIENT)
Age: 69
End: 2024-02-22

## 2024-02-22 VITALS
RESPIRATION RATE: 16 BRPM | WEIGHT: 180 LBS | TEMPERATURE: 97.8 F | BODY MASS INDEX: 25.77 KG/M2 | HEART RATE: 76 BPM | DIASTOLIC BLOOD PRESSURE: 72 MMHG | OXYGEN SATURATION: 94 % | SYSTOLIC BLOOD PRESSURE: 130 MMHG | HEIGHT: 70 IN

## 2024-02-22 DIAGNOSIS — C18.2 MALIGNANT NEOPLASM OF ASCENDING COLON (HCC): Primary | ICD-10-CM

## 2024-02-22 PROBLEM — C18.9 COLON CANCER (HCC): Status: ACTIVE | Noted: 2024-02-22

## 2024-02-22 PROCEDURE — 1123F ACP DISCUSS/DSCN MKR DOCD: CPT | Performed by: SURGERY

## 2024-02-22 PROCEDURE — G8427 DOCREV CUR MEDS BY ELIG CLIN: HCPCS | Performed by: SURGERY

## 2024-02-22 PROCEDURE — G8484 FLU IMMUNIZE NO ADMIN: HCPCS | Performed by: SURGERY

## 2024-02-22 PROCEDURE — 3017F COLORECTAL CA SCREEN DOC REV: CPT | Performed by: SURGERY

## 2024-02-22 PROCEDURE — 4004F PT TOBACCO SCREEN RCVD TLK: CPT | Performed by: SURGERY

## 2024-02-22 PROCEDURE — G8419 CALC BMI OUT NRM PARAM NOF/U: HCPCS | Performed by: SURGERY

## 2024-02-22 PROCEDURE — 99205 OFFICE O/P NEW HI 60 MIN: CPT | Performed by: SURGERY

## 2024-02-22 RX ORDER — PNV NO.95/FERROUS FUM/FOLIC AC 28MG-0.8MG
65 TABLET ORAL
COMMUNITY

## 2024-02-22 RX ORDER — FEXOFENADINE HCL 60 MG/1
60 TABLET, FILM COATED ORAL DAILY
COMMUNITY

## 2024-02-22 RX ORDER — AMLODIPINE BESYLATE 10 MG/1
10 TABLET ORAL DAILY
COMMUNITY
Start: 2023-12-15

## 2024-02-22 RX ORDER — LATANOPROST 50 UG/ML
1 SOLUTION/ DROPS OPHTHALMIC DAILY
COMMUNITY
Start: 2019-01-02

## 2024-02-22 RX ORDER — DICLOFENAC SODIUM 75 MG/1
75 TABLET, DELAYED RELEASE ORAL 2 TIMES DAILY
COMMUNITY
Start: 2019-02-13

## 2024-02-22 RX ORDER — DOXAZOSIN MESYLATE 4 MG/1
4 TABLET ORAL DAILY
COMMUNITY
Start: 2018-12-26

## 2024-02-22 ASSESSMENT — PATIENT HEALTH QUESTIONNAIRE - PHQ9
2. FEELING DOWN, DEPRESSED OR HOPELESS: 0
SUM OF ALL RESPONSES TO PHQ QUESTIONS 1-9: 0
SUM OF ALL RESPONSES TO PHQ9 QUESTIONS 1 & 2: 0
SUM OF ALL RESPONSES TO PHQ QUESTIONS 1-9: 0
1. LITTLE INTEREST OR PLEASURE IN DOING THINGS: 0

## 2024-02-22 NOTE — PROGRESS NOTES
Dexter Flaherty (:  1955) is a 68 y.o. male, here for evaluation of the following chief complaint(s):  New Patient (Seen at the request of Dr. Raf Gastelum to discuss resection of colon mass.)         ASSESSMENT/PLAN:  1. Malignant neoplasm of ascending colon (HCC)    The patient has a biopsy that is consistent with adenocarcinoma of the hepatic flexure     Diagnosed has been made by biopsy during colonoscopy by Dr. Gastelum.     I have interviewed and examined this patient in detail and have conducted an extensive review of her electronic medical record and I have explained the clinical implications of the findings of diagnostic testing, the treatment options and recommendations for care. These recommendations are being made based on my experience and knowledge and guidelines issued by the American Joint Committee on Cancer (AJCC).    Most recent imaging (CT) is negative for adenopathy     We discussed that the majority of colon cancers arise from polyps (adenomas). Benign adenomas, as well as those with severe dysplasia or carcinoma in situ (no evidence of invasive cancer, defined as invasion into the submucosa).    We discussed the multimodal treatment for colon cancer including the discussion of surgery, and the role and timing for chemotherapy.    We discussed how \"Staging\" a cancer provides a standard framework for describing disease extent. The stage of a colon cancer has three components, primary tumor (T), status of the regional nodes (N), and distant metastasis (M). We will determine a clinical stage and final staging is made from findings from surgical resection and histologic examination of the resected tissues and this staging is what the medical oncologist will use to help make appropriate recommendations regarding adjuvant chemotherapy.    There is no consensus on the role of neoadjuvant (preoperative) therapy for patients with locally advanced colon cancer. The phase III FOxTROT trial

## 2024-02-22 NOTE — PROGRESS NOTES
Identified pt with two pt identifiers(name and ). Reviewed record in preparation for visit and have obtained necessary documentation. All patient medications has been reviewed.    Chief Complaint   Patient presents with    New Patient     Seen at the request of Dr. Raf Gastelum to discuss resection of colon mass.       Health Maintenance Due   Topic    Lipids     Depression Screen     Hepatitis C screen     DTaP/Tdap/Td vaccine (1 - Tdap)    Diabetes screen     Shingles vaccine (1 of 2)    Low dose CT lung screening &/or counseling     Respiratory Syncytial Virus (RSV) Pregnant or age 60 yrs+ (1 - 1-dose 60+ series)    Pneumococcal 65+ years Vaccine (1 - PCV)    Flu vaccine (1)    Annual Wellness Visit (Medicare Advantage)        Vitals:    24 1031   BP: 130/72   Site: Left Upper Arm   Position: Sitting   Cuff Size: Large Adult   Pulse: 76   Resp: 16   Temp: 97.8 °F (36.6 °C)   TempSrc: Oral   SpO2: 94%   Weight: 81.6 kg (180 lb)   Height: 1.778 m (5' 10\")        Pain Score:   0 - No pain         4.Have you been to the ER, urgent care clinic since your last visit?  Hospitalized since your last visit? no    5. Have you seen or consulted any other health care providers outside of the [facility] since your last visit?  Include any pap smears or colon screening. no    Patient is accompanied by wife. I have received verbal consent from Dexter Flaherty to discuss any/all medical information while they are present in the room.

## 2024-02-23 RX ORDER — METRONIDAZOLE 500 MG/1
TABLET ORAL
Qty: 3 TABLET | Refills: 0 | Status: SHIPPED | OUTPATIENT
Start: 2024-02-23 | End: 2024-03-04

## 2024-02-23 RX ORDER — METOCLOPRAMIDE 10 MG/1
TABLET ORAL
Qty: 3 TABLET | Refills: 0 | Status: SHIPPED | OUTPATIENT
Start: 2024-02-23

## 2024-02-23 RX ORDER — NEOMYCIN SULFATE 500 MG/1
TABLET ORAL
Qty: 6 TABLET | Refills: 0 | Status: SHIPPED | OUTPATIENT
Start: 2024-02-23 | End: 2024-03-04

## 2024-02-23 ASSESSMENT — ENCOUNTER SYMPTOMS
BLOOD IN STOOL: 0
SHORTNESS OF BREATH: 0
EYE PAIN: 0

## 2024-02-23 NOTE — PROGRESS NOTES
ERAS 3 ring binder was given to patient along with Colyte instructions they were reviewed with patient and his wife, they said they understood. Hibiclens was given to patient. Patient and his wife were instructed to call if they had any questions.

## 2024-02-28 ENCOUNTER — HOSPITAL ENCOUNTER (OUTPATIENT)
Facility: HOSPITAL | Age: 69
Discharge: HOME | End: 2024-03-02
Payer: MEDICARE

## 2024-02-28 VITALS
HEIGHT: 70 IN | WEIGHT: 173.94 LBS | OXYGEN SATURATION: 97 % | BODY MASS INDEX: 24.9 KG/M2 | RESPIRATION RATE: 18 BRPM | DIASTOLIC BLOOD PRESSURE: 60 MMHG | SYSTOLIC BLOOD PRESSURE: 140 MMHG | HEART RATE: 72 BPM | TEMPERATURE: 98.4 F

## 2024-02-28 LAB
ABO + RH BLD: NORMAL
ALBUMIN SERPL-MCNC: 3.5 G/DL (ref 3.5–5)
ALBUMIN/GLOB SERPL: 0.8 (ref 1.1–2.2)
ALT SERPL-CCNC: 38 U/L (ref 12–78)
ANION GAP SERPL CALC-SCNC: 5 MMOL/L (ref 5–15)
APPEARANCE UR: CLEAR
APTT PPP: 26.4 SEC (ref 22.1–31)
AST SERPL-CCNC: 34 U/L (ref 15–37)
BACTERIA URNS QL MICRO: NEGATIVE /HPF
BILIRUB SERPL-MCNC: 0.3 MG/DL (ref 0.2–1)
BILIRUB UR QL: NEGATIVE
BLOOD GROUP ANTIBODIES SERPL: NORMAL
BUN SERPL-MCNC: 11 MG/DL (ref 6–20)
BUN/CREAT SERPL: 15 (ref 12–20)
CALCIUM SERPL-MCNC: 8.8 MG/DL (ref 8.5–10.1)
CEA SERPL-MCNC: 1.4 NG/ML
CHLORIDE SERPL-SCNC: 109 MMOL/L (ref 97–108)
CO2 SERPL-SCNC: 24 MMOL/L (ref 21–32)
COLOR UR: NORMAL
CREAT SERPL-MCNC: 0.74 MG/DL (ref 0.7–1.3)
EPITH CASTS URNS QL MICRO: NORMAL /LPF
ERYTHROCYTE [DISTWIDTH] IN BLOOD BY AUTOMATED COUNT: 12.6 % (ref 11.5–14.5)
GLOBULIN SER CALC-MCNC: 4.2 G/DL (ref 2–4)
GLUCOSE SERPL-MCNC: 122 MG/DL (ref 65–100)
GLUCOSE UR STRIP.AUTO-MCNC: NEGATIVE MG/DL
HCT VFR BLD AUTO: 37.4 % (ref 36.6–50.3)
HGB BLD-MCNC: 12.1 G/DL (ref 12.1–17)
HGB UR QL STRIP: NEGATIVE
INR PPP: 1.1 (ref 0.9–1.1)
KETONES UR QL STRIP.AUTO: NEGATIVE MG/DL
LEUKOCYTE ESTERASE UR QL STRIP.AUTO: NEGATIVE
MAGNESIUM SERPL-MCNC: 2.1 MG/DL (ref 1.6–2.4)
MCH RBC QN AUTO: 29.9 PG (ref 26–34)
MCHC RBC AUTO-ENTMCNC: 32.4 G/DL (ref 30–36.5)
MCV RBC AUTO: 92.3 FL (ref 80–99)
NITRITE UR QL STRIP.AUTO: NEGATIVE
NRBC # BLD: 0 K/UL (ref 0–0.01)
NRBC BLD-RTO: 0 PER 100 WBC
PH UR STRIP: 7 (ref 5–8)
PLATELET # BLD AUTO: 143 K/UL (ref 150–400)
PMV BLD AUTO: 10.1 FL (ref 8.9–12.9)
POTASSIUM SERPL-SCNC: 4 MMOL/L (ref 3.5–5.1)
PROT SERPL-MCNC: 7.7 G/DL (ref 6.4–8.2)
PROT UR STRIP-MCNC: NEGATIVE MG/DL
PROTHROMBIN TIME: 11.2 SEC (ref 9–11.1)
RBC # BLD AUTO: 4.05 M/UL (ref 4.1–5.7)
RBC #/AREA URNS HPF: NORMAL /HPF (ref 0–5)
SODIUM SERPL-SCNC: 138 MMOL/L (ref 136–145)
SP GR UR REFRACTOMETRY: 1.01
SPECIMEN EXP DATE BLD: NORMAL
THERAPEUTIC RANGE: NORMAL SECS (ref 58–77)
URINE CULTURE IF INDICATED: NORMAL
UROBILINOGEN UR QL STRIP.AUTO: 0.2 EU/DL (ref 0.2–1)
WBC # BLD AUTO: 8.9 K/UL (ref 4.1–11.1)
WBC URNS QL MICRO: NORMAL /HPF (ref 0–4)

## 2024-02-28 PROCEDURE — 86901 BLOOD TYPING SEROLOGIC RH(D): CPT

## 2024-02-28 PROCEDURE — 81001 URINALYSIS AUTO W/SCOPE: CPT

## 2024-02-28 PROCEDURE — 86900 BLOOD TYPING SEROLOGIC ABO: CPT

## 2024-02-28 PROCEDURE — 85610 PROTHROMBIN TIME: CPT

## 2024-02-28 PROCEDURE — 86850 RBC ANTIBODY SCREEN: CPT

## 2024-02-28 PROCEDURE — 71046 X-RAY EXAM CHEST 2 VIEWS: CPT

## 2024-02-28 PROCEDURE — 85730 THROMBOPLASTIN TIME PARTIAL: CPT

## 2024-02-28 PROCEDURE — 83735 ASSAY OF MAGNESIUM: CPT

## 2024-02-28 PROCEDURE — 85027 COMPLETE CBC AUTOMATED: CPT

## 2024-02-28 PROCEDURE — 80053 COMPREHEN METABOLIC PANEL: CPT

## 2024-02-28 PROCEDURE — 82378 CARCINOEMBRYONIC ANTIGEN: CPT

## 2024-02-28 PROCEDURE — 36415 COLL VENOUS BLD VENIPUNCTURE: CPT

## 2024-02-28 RX ORDER — SODIUM CHLORIDE, SODIUM LACTATE, POTASSIUM CHLORIDE, CALCIUM CHLORIDE 600; 310; 30; 20 MG/100ML; MG/100ML; MG/100ML; MG/100ML
INJECTION, SOLUTION INTRAVENOUS CONTINUOUS
OUTPATIENT
Start: 2024-03-11

## 2024-02-28 RX ORDER — ENOXAPARIN SODIUM 100 MG/ML
40 INJECTION SUBCUTANEOUS ONCE
OUTPATIENT
Start: 2024-03-11

## 2024-02-28 RX ORDER — ACETAMINOPHEN 500 MG
1000 TABLET ORAL ONCE
OUTPATIENT
Start: 2024-03-11

## 2024-02-28 RX ORDER — METRONIDAZOLE 500 MG/100ML
500 INJECTION, SOLUTION INTRAVENOUS ONCE
OUTPATIENT
Start: 2024-03-11

## 2024-02-28 RX ORDER — CEPHALEXIN 500 MG/1
500 CAPSULE ORAL 4 TIMES DAILY
COMMUNITY
Start: 2024-02-26 | End: 2024-03-03

## 2024-02-28 NOTE — PERIOP NOTE
Hibiclens/Chlorhexidine    Preventing Infections Before and After - Your Surgery    IMPORTANT INSTRUCTIONS    Please read and follow these instructions carefully. If you are unable to comply with the below instructions your procedure will be cancelled.       Every Night for Three (3) nights before your surgery:  Shower with an antibacterial soap, such as Dial, or the soap provided at your preassessment appointment. A shower is better than a bath for cleaning your skin.  If needed, ask someone to help you reach all areas of your body. Don’t forget to clean your belly button with every shower.    The night before your surgery:   If you lose your Hibiclens/chlorhexidine please contact surgery center or you can purchase it at a local pharmacy  On the night before your surgery, shower with an antibacterial soap, such as Dial, or the soap provided at your preassessment appointment.   With one packet of Hibiclens/Chlorhexidine in hand, turn water off.  Apply Hibiclens antiseptic skin cleanser with a clean, freshly washed washcloth.  Gently apply to your body from chin to toes (except the genital area) and especially the area(s) where your incision(s) will be.  Leave Hibiclens/Chlorhexidine on your skin for at least 20 seconds.    CAUTION: If needed, Hibiclens/chlorhexidine may be used to clean the folds of skin of the legs (such as in the area of the groin) and on your buttocks and hips. However, do not use Hibiclens/Chlorhexidine above the neck or in the genital area (your bottom) or put inside any area of your body.  Turn the water back on and rinse.  Dry gently with a clean, freshly washed towel.  After your shower, do not use any powder, deodorant, perfumes or lotion.  Use clean, freshly washed towels and washcloths every time you shower.  Wear clean, freshly washed pajamas to bed the night before surgery.  Sleep on clean, freshly washed sheets.  Do not allow pets to sleep in your bed with you.        The Morning of 
Incentive Spirometer        Using the incentive spirometer helps expand the small air sacs of your lungs, helps you breathe deeply, and helps improve your lung function.  Use your incentive spirometer twice a day (10 breaths each time) prior to surgery.      How to Use Your Incentive Spirometer:  Hold the incentive spirometer in an upright position.   Breathe out as usual.   Place the mouthpiece in your mouth and seal your lips tightly around it.   Take a deep breath.  Breathe in slowly and as deeply as possible. Keep the blue flow rate guide between the arrows.   Hold your breath as long as possible. Then exhale slowly and allow the piston to fall to the bottom of the column.   Rest for a few seconds and repeat steps one through five at least 10 times.     PAT Tidal Volume__2500___  x______2__________  Date___2/28/24____________________    BRING THE INCENTIVE SPIROMETER WITH YOU TO THE HOSPITAL ON THE DAY OF YOUR SURGERY.  Opportunity given to ask and answer questions as well as to observe return demonstration.    Patient signature_____________________________    Witness____________________________  
Orthopedic and Spine Patients:  Instructions on When You Can   Eat or Drink Before Surgery      You have been provided a pre-surgery drink received at your pre-admission testing appointment.    Night before surgery:  You should drink 1/2 bottle of the  pre-surgery drink at bedtime. No food after midnight!    Day of Surgery:  Complete 2nd half of the bottle of the pre-surgery drink 1 hour prior to arrival at hospital.  For questions call Pre-Admission Testing at [phone].  They are available from 8:00am-5:00pm, Monday through Friday.  
The [facility] Enhanced Recovery Program (ERAS) book was reviewed with the patient during the pre-admission testing (PAT) appointment. An opportunity for questions was provided, patient verbalized understanding.     ZOILA 4. STOP BANG tool form faxed to PCP . Waiting for fax confirmation. Fax confirmed    Received voicemail from Maria Luisa at surgical services that stated Dr. Spear is aware of pt's platelet level--no new orders and to d/c celebrex order d/t pt's sulfa allergy.   
and on the morning of surgery, but do not apply any lotions, powders or deodorants after the shower on the day of surgery. Please use a fresh towels after each shower. Please sleep in clean clothes and change bed linens the night before surgery.  Please do not shave for 48 hours prior to surgery. Shaving of the face is acceptable.    7. You should understand that if you do not follow these instructions your surgery may be cancelled.  If your physical condition changes (I.e. fever, cold or flu) please contact your surgeon as soon as possible.    8. It is important that you be on time.  If a situation occurs where you may be late, please call [phone] (OR Holding Area).    9. If you have any questions and or problems, please call [phone] (Pre-admission Testing).    10. Your surgery time may be subject to change.  You will receive a phone call the evening prior with your arrival time.    11.  If having outpatient surgery, you must have someone to drive you here, stay with you during the duration of your stay, and to drive you home at time of discharge.      Special Instructions:     Start ensure surgery immuno-nutrition shakes on date ( 3/5 ) four times a day through date ( 3/9 )  On date ( 3/10  ) eat a regular breakfast   On date ( 3/10  ) start clear liquid diet at 12 noon     Begin bowel prep Colytely at 12 noon the day before surgery and follow instructions per surgeon  Take metoclopramide at 12 noon, 5 pm, and 10 pm the day before surgery  Take metronidazole at 2 pm, 5 pm, and 10 pm the day before surgery  Take neomycin at 2 pm, 5 pm, and 10 pm the day before surgery     Take presurgery drink one half bottle the night before surgery before midnight and drink the second half 1 hour before time of arrival to the hospital     Start incentive spirimetry on 2/28 and practice twice daily- 10 times each and bring incentive spirometer with you the day of surgery     TAKE ALL MEDICATIONS THE DAY OF SURGERY

## 2024-02-29 ENCOUNTER — TELEPHONE (OUTPATIENT)
Age: 69
End: 2024-02-29

## 2024-02-29 LAB
BACTERIA SPEC CULT: NORMAL
BACTERIA SPEC CULT: NORMAL
EKG ATRIAL RATE: 68 BPM
EKG DIAGNOSIS: NORMAL
EKG P AXIS: 46 DEGREES
EKG P-R INTERVAL: 186 MS
EKG Q-T INTERVAL: 394 MS
EKG QRS DURATION: 98 MS
EKG QTC CALCULATION (BAZETT): 418 MS
EKG R AXIS: 58 DEGREES
EKG T AXIS: 54 DEGREES
EKG VENTRICULAR RATE: 68 BPM
SERVICE CMNT-IMP: NORMAL

## 2024-02-29 NOTE — TELEPHONE ENCOUNTER
Nader called from [facility] and said she was putting in orders and Celebrex is contraindicated with a sulfa allergy. Patient stated that he does not know what his reaction was to sulfa. It is a allergy from when he was a baby.   I told her that I would let Dr Spear know and I will let her know about the Celebrex.

## 2024-02-29 NOTE — TELEPHONE ENCOUNTER
Called Nader back to let her know that Dr Spear said since the celebrex is contraindicated with his sulfa allergy then do not order the celebrex.  Also Dr Spear reviewed the Platelets and said that is fine, no new orders.  Had to leave a voicemail

## 2024-03-11 ENCOUNTER — HOSPITAL ENCOUNTER (INPATIENT)
Facility: HOSPITAL | Age: 69
LOS: 1 days | Discharge: HOME | DRG: 331 | End: 2024-03-12
Attending: SURGERY | Admitting: SURGERY
Payer: MEDICARE

## 2024-03-11 ENCOUNTER — ANESTHESIA EVENT (OUTPATIENT)
Facility: HOSPITAL | Age: 69
DRG: 331 | End: 2024-03-11
Payer: MEDICARE

## 2024-03-11 ENCOUNTER — ANESTHESIA (OUTPATIENT)
Facility: HOSPITAL | Age: 69
DRG: 331 | End: 2024-03-11
Payer: MEDICARE

## 2024-03-11 DIAGNOSIS — C18.2 MALIGNANT NEOPLASM OF ASCENDING COLON (HCC): Primary | ICD-10-CM

## 2024-03-11 LAB
GLUCOSE BLD STRIP.AUTO-MCNC: 140 MG/DL (ref 65–117)
GLUCOSE BLD STRIP.AUTO-MCNC: 226 MG/DL (ref 65–117)
GLUCOSE BLD STRIP.AUTO-MCNC: 232 MG/DL (ref 65–117)
SERVICE CMNT-IMP: ABNORMAL

## 2024-03-11 PROCEDURE — 2720000010 HC SURG SUPPLY STERILE: Performed by: SURGERY

## 2024-03-11 PROCEDURE — S2900 ROBOTIC SURGICAL SYSTEM: HCPCS | Performed by: SURGERY

## 2024-03-11 PROCEDURE — 2500000003 HC RX 250 WO HCPCS: Performed by: SURGERY

## 2024-03-11 PROCEDURE — 6360000002 HC RX W HCPCS: Performed by: SURGERY

## 2024-03-11 PROCEDURE — 2500000003 HC RX 250 WO HCPCS

## 2024-03-11 PROCEDURE — 6360000002 HC RX W HCPCS: Performed by: ANESTHESIOLOGY

## 2024-03-11 PROCEDURE — 2580000003 HC RX 258: Performed by: ANESTHESIOLOGY

## 2024-03-11 PROCEDURE — 6360000002 HC RX W HCPCS

## 2024-03-11 PROCEDURE — 6370000000 HC RX 637 (ALT 250 FOR IP): Performed by: SURGERY

## 2024-03-11 PROCEDURE — 3700000001 HC ADD 15 MINUTES (ANESTHESIA): Performed by: SURGERY

## 2024-03-11 PROCEDURE — 44205 LAP COLECTOMY PART W/ILEUM: CPT | Performed by: SURGERY

## 2024-03-11 PROCEDURE — 3700000000 HC ANESTHESIA ATTENDED CARE: Performed by: SURGERY

## 2024-03-11 PROCEDURE — 82962 GLUCOSE BLOOD TEST: CPT

## 2024-03-11 PROCEDURE — 3600000009 HC SURGERY ROBOT BASE: Performed by: SURGERY

## 2024-03-11 PROCEDURE — 0DTF4ZZ RESECTION OF RIGHT LARGE INTESTINE, PERCUTANEOUS ENDOSCOPIC APPROACH: ICD-10-PCS | Performed by: SURGERY

## 2024-03-11 PROCEDURE — 2580000003 HC RX 258

## 2024-03-11 PROCEDURE — 8E0W4CZ ROBOTIC ASSISTED PROCEDURE OF TRUNK REGION, PERCUTANEOUS ENDOSCOPIC APPROACH: ICD-10-PCS | Performed by: SURGERY

## 2024-03-11 PROCEDURE — 3600000019 HC SURGERY ROBOT ADDTL 15MIN: Performed by: SURGERY

## 2024-03-11 PROCEDURE — 2500000003 HC RX 250 WO HCPCS: Performed by: ANESTHESIOLOGY

## 2024-03-11 PROCEDURE — 1100000000 HC RM PRIVATE

## 2024-03-11 PROCEDURE — 7100000001 HC PACU RECOVERY - ADDTL 15 MIN: Performed by: SURGERY

## 2024-03-11 PROCEDURE — 88309 TISSUE EXAM BY PATHOLOGIST: CPT

## 2024-03-11 PROCEDURE — C1713 ANCHOR/SCREW BN/BN,TIS/BN: HCPCS | Performed by: SURGERY

## 2024-03-11 PROCEDURE — 2709999900 HC NON-CHARGEABLE SUPPLY: Performed by: SURGERY

## 2024-03-11 PROCEDURE — 7100000000 HC PACU RECOVERY - FIRST 15 MIN: Performed by: SURGERY

## 2024-03-11 PROCEDURE — 2580000003 HC RX 258: Performed by: SURGERY

## 2024-03-11 RX ORDER — CARVEDILOL 12.5 MG/1
12.5 TABLET ORAL 2 TIMES DAILY WITH MEALS
Status: DISCONTINUED | OUTPATIENT
Start: 2024-03-11 | End: 2024-03-12 | Stop reason: HOSPADM

## 2024-03-11 RX ORDER — DEXAMETHASONE SODIUM PHOSPHATE 4 MG/ML
INJECTION, SOLUTION INTRA-ARTICULAR; INTRALESIONAL; INTRAMUSCULAR; INTRAVENOUS; SOFT TISSUE PRN
Status: DISCONTINUED | OUTPATIENT
Start: 2024-03-11 | End: 2024-03-11 | Stop reason: SDUPTHER

## 2024-03-11 RX ORDER — NALOXONE HYDROCHLORIDE 0.4 MG/ML
INJECTION, SOLUTION INTRAMUSCULAR; INTRAVENOUS; SUBCUTANEOUS PRN
Status: DISCONTINUED | OUTPATIENT
Start: 2024-03-11 | End: 2024-03-11 | Stop reason: HOSPADM

## 2024-03-11 RX ORDER — HYDRALAZINE HYDROCHLORIDE 20 MG/ML
10 INJECTION INTRAMUSCULAR; INTRAVENOUS EVERY 6 HOURS PRN
Status: DISCONTINUED | OUTPATIENT
Start: 2024-03-11 | End: 2024-03-12 | Stop reason: HOSPADM

## 2024-03-11 RX ORDER — INSULIN LISPRO 100 [IU]/ML
0-4 INJECTION, SOLUTION INTRAVENOUS; SUBCUTANEOUS NIGHTLY
Status: DISCONTINUED | OUTPATIENT
Start: 2024-03-11 | End: 2024-03-12 | Stop reason: HOSPADM

## 2024-03-11 RX ORDER — OXYCODONE HYDROCHLORIDE 5 MG/1
10 TABLET ORAL EVERY 4 HOURS PRN
Status: DISCONTINUED | OUTPATIENT
Start: 2024-03-11 | End: 2024-03-12 | Stop reason: HOSPADM

## 2024-03-11 RX ORDER — ACETAMINOPHEN 500 MG
1000 TABLET ORAL ONCE
Status: DISCONTINUED | OUTPATIENT
Start: 2024-03-11 | End: 2024-03-11 | Stop reason: HOSPADM

## 2024-03-11 RX ORDER — ONDANSETRON 2 MG/ML
4 INJECTION INTRAMUSCULAR; INTRAVENOUS EVERY 6 HOURS PRN
Status: DISCONTINUED | OUTPATIENT
Start: 2024-03-11 | End: 2024-03-12 | Stop reason: HOSPADM

## 2024-03-11 RX ORDER — ENOXAPARIN SODIUM 100 MG/ML
40 INJECTION SUBCUTANEOUS ONCE
Status: COMPLETED | OUTPATIENT
Start: 2024-03-11 | End: 2024-03-11

## 2024-03-11 RX ORDER — SODIUM CHLORIDE 0.9 % (FLUSH) 0.9 %
5-40 SYRINGE (ML) INJECTION PRN
Status: DISCONTINUED | OUTPATIENT
Start: 2024-03-11 | End: 2024-03-11 | Stop reason: HOSPADM

## 2024-03-11 RX ORDER — HYDROMORPHONE HYDROCHLORIDE 1 MG/ML
0.25 INJECTION, SOLUTION INTRAMUSCULAR; INTRAVENOUS; SUBCUTANEOUS
Status: DISCONTINUED | OUTPATIENT
Start: 2024-03-11 | End: 2024-03-12 | Stop reason: HOSPADM

## 2024-03-11 RX ORDER — HYDROMORPHONE HYDROCHLORIDE 1 MG/ML
0.5 INJECTION, SOLUTION INTRAMUSCULAR; INTRAVENOUS; SUBCUTANEOUS
Status: DISCONTINUED | OUTPATIENT
Start: 2024-03-11 | End: 2024-03-12 | Stop reason: HOSPADM

## 2024-03-11 RX ORDER — ACETAMINOPHEN 325 MG/1
650 TABLET ORAL
Status: DISCONTINUED | OUTPATIENT
Start: 2024-03-11 | End: 2024-03-11 | Stop reason: HOSPADM

## 2024-03-11 RX ORDER — ONDANSETRON 2 MG/ML
4 INJECTION INTRAMUSCULAR; INTRAVENOUS ONCE
Status: COMPLETED | OUTPATIENT
Start: 2024-03-11 | End: 2024-03-11

## 2024-03-11 RX ORDER — BUPIVACAINE HYDROCHLORIDE 5 MG/ML
INJECTION, SOLUTION PERINEURAL PRN
Status: DISCONTINUED | OUTPATIENT
Start: 2024-03-11 | End: 2024-03-11 | Stop reason: ALTCHOICE

## 2024-03-11 RX ORDER — DEXTROSE MONOHYDRATE, SODIUM CHLORIDE, AND POTASSIUM CHLORIDE 50; 1.49; 4.5 G/1000ML; G/1000ML; G/1000ML
INJECTION, SOLUTION INTRAVENOUS CONTINUOUS
Status: DISCONTINUED | OUTPATIENT
Start: 2024-03-11 | End: 2024-03-12 | Stop reason: HOSPADM

## 2024-03-11 RX ORDER — AMLODIPINE BESYLATE 5 MG/1
10 TABLET ORAL DAILY
Status: DISCONTINUED | OUTPATIENT
Start: 2024-03-12 | End: 2024-03-12 | Stop reason: HOSPADM

## 2024-03-11 RX ORDER — ACETAMINOPHEN 500 MG
1000 TABLET ORAL ONCE
Status: COMPLETED | OUTPATIENT
Start: 2024-03-11 | End: 2024-03-11

## 2024-03-11 RX ORDER — MIDAZOLAM HYDROCHLORIDE 2 MG/2ML
2 INJECTION, SOLUTION INTRAMUSCULAR; INTRAVENOUS
Status: DISCONTINUED | OUTPATIENT
Start: 2024-03-11 | End: 2024-03-11 | Stop reason: HOSPADM

## 2024-03-11 RX ORDER — SODIUM CHLORIDE 0.9 % (FLUSH) 0.9 %
5-40 SYRINGE (ML) INJECTION EVERY 12 HOURS SCHEDULED
Status: DISCONTINUED | OUTPATIENT
Start: 2024-03-11 | End: 2024-03-11 | Stop reason: HOSPADM

## 2024-03-11 RX ORDER — SODIUM CHLORIDE, SODIUM LACTATE, POTASSIUM CHLORIDE, CALCIUM CHLORIDE 600; 310; 30; 20 MG/100ML; MG/100ML; MG/100ML; MG/100ML
INJECTION, SOLUTION INTRAVENOUS CONTINUOUS
Status: DISCONTINUED | OUTPATIENT
Start: 2024-03-11 | End: 2024-03-11 | Stop reason: HOSPADM

## 2024-03-11 RX ORDER — KETAMINE HYDROCHLORIDE 50 MG/ML
INJECTION, SOLUTION INTRAMUSCULAR; INTRAVENOUS PRN
Status: DISCONTINUED | OUTPATIENT
Start: 2024-03-11 | End: 2024-03-11 | Stop reason: SDUPTHER

## 2024-03-11 RX ORDER — METRONIDAZOLE 500 MG/100ML
500 INJECTION, SOLUTION INTRAVENOUS ONCE
Status: COMPLETED | OUTPATIENT
Start: 2024-03-11 | End: 2024-03-11

## 2024-03-11 RX ORDER — HYDRALAZINE HYDROCHLORIDE 20 MG/ML
10 INJECTION INTRAMUSCULAR; INTRAVENOUS
Status: DISCONTINUED | OUTPATIENT
Start: 2024-03-11 | End: 2024-03-11 | Stop reason: HOSPADM

## 2024-03-11 RX ORDER — PROCHLORPERAZINE EDISYLATE 5 MG/ML
5 INJECTION INTRAMUSCULAR; INTRAVENOUS
Status: DISCONTINUED | OUTPATIENT
Start: 2024-03-11 | End: 2024-03-11 | Stop reason: HOSPADM

## 2024-03-11 RX ORDER — ACETAMINOPHEN 325 MG/1
650 TABLET ORAL EVERY 4 HOURS PRN
Status: DISCONTINUED | OUTPATIENT
Start: 2024-03-11 | End: 2024-03-12 | Stop reason: HOSPADM

## 2024-03-11 RX ORDER — EPHEDRINE SULFATE/0.9% NACL/PF 25 MG/5 ML
SYRINGE (ML) INTRAVENOUS PRN
Status: DISCONTINUED | OUTPATIENT
Start: 2024-03-11 | End: 2024-03-11 | Stop reason: SDUPTHER

## 2024-03-11 RX ORDER — MAGNESIUM SULFATE HEPTAHYDRATE 40 MG/ML
INJECTION, SOLUTION INTRAVENOUS PRN
Status: DISCONTINUED | OUTPATIENT
Start: 2024-03-11 | End: 2024-03-11 | Stop reason: SDUPTHER

## 2024-03-11 RX ORDER — HYDROMORPHONE HYDROCHLORIDE 2 MG/ML
INJECTION, SOLUTION INTRAMUSCULAR; INTRAVENOUS; SUBCUTANEOUS PRN
Status: DISCONTINUED | OUTPATIENT
Start: 2024-03-11 | End: 2024-03-11 | Stop reason: SDUPTHER

## 2024-03-11 RX ORDER — FENTANYL CITRATE 50 UG/ML
25 INJECTION, SOLUTION INTRAMUSCULAR; INTRAVENOUS EVERY 5 MIN PRN
Status: COMPLETED | OUTPATIENT
Start: 2024-03-11 | End: 2024-03-11

## 2024-03-11 RX ORDER — DEXAMETHASONE SODIUM PHOSPHATE 4 MG/ML
4 INJECTION, SOLUTION INTRA-ARTICULAR; INTRALESIONAL; INTRAMUSCULAR; INTRAVENOUS; SOFT TISSUE
Status: DISCONTINUED | OUTPATIENT
Start: 2024-03-11 | End: 2024-03-11 | Stop reason: HOSPADM

## 2024-03-11 RX ORDER — SODIUM CHLORIDE 9 MG/ML
INJECTION, SOLUTION INTRAVENOUS PRN
Status: DISCONTINUED | OUTPATIENT
Start: 2024-03-11 | End: 2024-03-11 | Stop reason: HOSPADM

## 2024-03-11 RX ORDER — FENTANYL CITRATE 50 UG/ML
100 INJECTION, SOLUTION INTRAMUSCULAR; INTRAVENOUS
Status: COMPLETED | OUTPATIENT
Start: 2024-03-11 | End: 2024-03-11

## 2024-03-11 RX ORDER — DOXAZOSIN 2 MG/1
4 TABLET ORAL DAILY
Status: DISCONTINUED | OUTPATIENT
Start: 2024-03-11 | End: 2024-03-12 | Stop reason: HOSPADM

## 2024-03-11 RX ORDER — ROSUVASTATIN CALCIUM 20 MG/1
20 TABLET, COATED ORAL 2 TIMES DAILY
Status: DISCONTINUED | OUTPATIENT
Start: 2024-03-12 | End: 2024-03-12 | Stop reason: HOSPADM

## 2024-03-11 RX ORDER — LIDOCAINE HYDROCHLORIDE 20 MG/ML
INJECTION, SOLUTION EPIDURAL; INFILTRATION; INTRACAUDAL; PERINEURAL PRN
Status: DISCONTINUED | OUTPATIENT
Start: 2024-03-11 | End: 2024-03-11 | Stop reason: SDUPTHER

## 2024-03-11 RX ORDER — INDOCYANINE GREEN AND WATER 25 MG
KIT INJECTION PRN
Status: DISCONTINUED | OUTPATIENT
Start: 2024-03-11 | End: 2024-03-11 | Stop reason: SDUPTHER

## 2024-03-11 RX ORDER — OXYCODONE HYDROCHLORIDE 5 MG/1
5 TABLET ORAL EVERY 4 HOURS PRN
Status: DISCONTINUED | OUTPATIENT
Start: 2024-03-11 | End: 2024-03-12 | Stop reason: HOSPADM

## 2024-03-11 RX ORDER — HYDROMORPHONE HYDROCHLORIDE 1 MG/ML
0.25 INJECTION, SOLUTION INTRAMUSCULAR; INTRAVENOUS; SUBCUTANEOUS EVERY 5 MIN PRN
Status: COMPLETED | OUTPATIENT
Start: 2024-03-11 | End: 2024-03-11

## 2024-03-11 RX ORDER — ROCURONIUM BROMIDE 10 MG/ML
INJECTION, SOLUTION INTRAVENOUS PRN
Status: DISCONTINUED | OUTPATIENT
Start: 2024-03-11 | End: 2024-03-11 | Stop reason: SDUPTHER

## 2024-03-11 RX ORDER — EPHEDRINE SULFATE/0.9% NACL/PF 50 MG/5 ML
SYRINGE (ML) INTRAVENOUS PRN
Status: DISCONTINUED | OUTPATIENT
Start: 2024-03-11 | End: 2024-03-11 | Stop reason: SDUPTHER

## 2024-03-11 RX ORDER — OXYCODONE HYDROCHLORIDE 5 MG/1
5 TABLET ORAL
Status: DISCONTINUED | OUTPATIENT
Start: 2024-03-11 | End: 2024-03-11 | Stop reason: HOSPADM

## 2024-03-11 RX ORDER — SODIUM CHLORIDE, SODIUM LACTATE, POTASSIUM CHLORIDE, CALCIUM CHLORIDE 600; 310; 30; 20 MG/100ML; MG/100ML; MG/100ML; MG/100ML
INJECTION, SOLUTION INTRAVENOUS CONTINUOUS PRN
Status: DISCONTINUED | OUTPATIENT
Start: 2024-03-11 | End: 2024-03-11 | Stop reason: SDUPTHER

## 2024-03-11 RX ORDER — ONDANSETRON 4 MG/1
4 TABLET, ORALLY DISINTEGRATING ORAL EVERY 8 HOURS PRN
Status: DISCONTINUED | OUTPATIENT
Start: 2024-03-11 | End: 2024-03-12 | Stop reason: HOSPADM

## 2024-03-11 RX ORDER — KETOROLAC TROMETHAMINE 30 MG/ML
15 INJECTION, SOLUTION INTRAMUSCULAR; INTRAVENOUS EVERY 6 HOURS
Status: DISCONTINUED | OUTPATIENT
Start: 2024-03-11 | End: 2024-03-12 | Stop reason: HOSPADM

## 2024-03-11 RX ORDER — LIDOCAINE HYDROCHLORIDE ANHYDROUS AND DEXTROSE MONOHYDRATE 5; 400 G/100ML; MG/100ML
INJECTION, SOLUTION INTRAVENOUS CONTINUOUS PRN
Status: DISCONTINUED | OUTPATIENT
Start: 2024-03-11 | End: 2024-03-11 | Stop reason: SDUPTHER

## 2024-03-11 RX ORDER — PHENYLEPHRINE HCL IN 0.9% NACL 0.4MG/10ML
SYRINGE (ML) INTRAVENOUS PRN
Status: DISCONTINUED | OUTPATIENT
Start: 2024-03-11 | End: 2024-03-11 | Stop reason: SDUPTHER

## 2024-03-11 RX ORDER — INSULIN LISPRO 100 [IU]/ML
0-4 INJECTION, SOLUTION INTRAVENOUS; SUBCUTANEOUS
Status: DISCONTINUED | OUTPATIENT
Start: 2024-03-11 | End: 2024-03-12 | Stop reason: HOSPADM

## 2024-03-11 RX ORDER — ENOXAPARIN SODIUM 100 MG/ML
40 INJECTION SUBCUTANEOUS DAILY
Status: DISCONTINUED | OUTPATIENT
Start: 2024-03-12 | End: 2024-03-12 | Stop reason: HOSPADM

## 2024-03-11 RX ADMIN — MAGNESIUM SULFATE IN WATER 2000 MG: 40 INJECTION, SOLUTION INTRAVENOUS at 08:16

## 2024-03-11 RX ADMIN — KETOROLAC TROMETHAMINE 15 MG: 30 INJECTION, SOLUTION INTRAMUSCULAR at 20:01

## 2024-03-11 RX ADMIN — Medication 5 MG: at 08:03

## 2024-03-11 RX ADMIN — PHENYLEPHRINE HYDROCHLORIDE 20 MCG/MIN: 10 INJECTION INTRAVENOUS at 08:03

## 2024-03-11 RX ADMIN — PROPOFOL 150 MG: 10 INJECTION, EMULSION INTRAVENOUS at 07:36

## 2024-03-11 RX ADMIN — Medication 40 MCG: at 08:50

## 2024-03-11 RX ADMIN — HYDROMORPHONE HYDROCHLORIDE 0.5 MG: 2 INJECTION INTRAMUSCULAR; INTRAVENOUS; SUBCUTANEOUS at 10:22

## 2024-03-11 RX ADMIN — POTASSIUM CHLORIDE, DEXTROSE MONOHYDRATE AND SODIUM CHLORIDE: 150; 5; 450 INJECTION, SOLUTION INTRAVENOUS at 16:42

## 2024-03-11 RX ADMIN — HYDROMORPHONE HYDROCHLORIDE 0.25 MG: 1 INJECTION, SOLUTION INTRAMUSCULAR; INTRAVENOUS; SUBCUTANEOUS at 11:43

## 2024-03-11 RX ADMIN — DEXAMETHASONE SODIUM PHOSPHATE 8 MG: 4 INJECTION, SOLUTION INTRAMUSCULAR; INTRAVENOUS at 07:57

## 2024-03-11 RX ADMIN — OXYCODONE 5 MG: 5 TABLET ORAL at 12:48

## 2024-03-11 RX ADMIN — ONDANSETRON HYDROCHLORIDE 4 MG: 2 INJECTION, SOLUTION INTRAMUSCULAR; INTRAVENOUS at 07:57

## 2024-03-11 RX ADMIN — ROCURONIUM BROMIDE 15 MG: 10 INJECTION INTRAVENOUS at 08:33

## 2024-03-11 RX ADMIN — FENTANYL CITRATE 25 MCG: 50 INJECTION INTRAMUSCULAR; INTRAVENOUS at 11:39

## 2024-03-11 RX ADMIN — ENOXAPARIN SODIUM 40 MG: 100 INJECTION SUBCUTANEOUS at 06:57

## 2024-03-11 RX ADMIN — Medication 80 MCG: at 08:03

## 2024-03-11 RX ADMIN — INDOCYANINE GREEN 5 MG: KIT INTRAVENOUS at 09:09

## 2024-03-11 RX ADMIN — ROCURONIUM BROMIDE 50 MG: 10 INJECTION INTRAVENOUS at 07:37

## 2024-03-11 RX ADMIN — FENTANYL CITRATE 25 MCG: 50 INJECTION INTRAMUSCULAR; INTRAVENOUS at 11:12

## 2024-03-11 RX ADMIN — HYDROMORPHONE HYDROCHLORIDE 0.25 MG: 1 INJECTION, SOLUTION INTRAMUSCULAR; INTRAVENOUS; SUBCUTANEOUS at 11:59

## 2024-03-11 RX ADMIN — FENTANYL CITRATE 50 MCG: 50 INJECTION, SOLUTION INTRAMUSCULAR; INTRAVENOUS at 07:35

## 2024-03-11 RX ADMIN — KETAMINE HYDROCHLORIDE 30 MG: 50 INJECTION, SOLUTION, CONCENTRATE INTRAMUSCULAR; INTRAVENOUS at 07:52

## 2024-03-11 RX ADMIN — METRONIDAZOLE 500 MG: 500 INJECTION, SOLUTION INTRAVENOUS at 07:53

## 2024-03-11 RX ADMIN — CARVEDILOL 12.5 MG: 12.5 TABLET, FILM COATED ORAL at 17:13

## 2024-03-11 RX ADMIN — KETOROLAC TROMETHAMINE 15 MG: 30 INJECTION, SOLUTION INTRAMUSCULAR at 14:44

## 2024-03-11 RX ADMIN — Medication 80 MCG: at 07:55

## 2024-03-11 RX ADMIN — LIDOCAINE HYDROCHLORIDE 100 MG: 20 INJECTION, SOLUTION EPIDURAL; INFILTRATION; INTRACAUDAL; PERINEURAL at 07:36

## 2024-03-11 RX ADMIN — FENTANYL CITRATE 25 MCG: 50 INJECTION INTRAMUSCULAR; INTRAVENOUS at 10:48

## 2024-03-11 RX ADMIN — Medication 80 MCG: at 07:57

## 2024-03-11 RX ADMIN — SUGAMMADEX 200 MG: 100 INJECTION, SOLUTION INTRAVENOUS at 09:55

## 2024-03-11 RX ADMIN — OXYCODONE HYDROCHLORIDE 10 MG: 5 TABLET ORAL at 23:32

## 2024-03-11 RX ADMIN — HYDROMORPHONE HYDROCHLORIDE 0.25 MG: 1 INJECTION, SOLUTION INTRAMUSCULAR; INTRAVENOUS; SUBCUTANEOUS at 12:44

## 2024-03-11 RX ADMIN — Medication 5 MG: at 08:07

## 2024-03-11 RX ADMIN — Medication 40 MCG: at 09:42

## 2024-03-11 RX ADMIN — EPHEDRINE SULFATE 10 MG: 5 INJECTION INTRAVENOUS at 09:04

## 2024-03-11 RX ADMIN — LIDOCAINE HYDROCHLORIDE 2 MG/KG/HR: 4 INJECTION, SOLUTION INTRAVENOUS at 07:42

## 2024-03-11 RX ADMIN — Medication 5 MG: at 07:57

## 2024-03-11 RX ADMIN — FENTANYL CITRATE 50 MCG: 50 INJECTION, SOLUTION INTRAMUSCULAR; INTRAVENOUS at 08:13

## 2024-03-11 RX ADMIN — FENTANYL CITRATE 25 MCG: 50 INJECTION INTRAMUSCULAR; INTRAVENOUS at 10:37

## 2024-03-11 RX ADMIN — Medication 5 MG: at 07:55

## 2024-03-11 RX ADMIN — HYDROMORPHONE HYDROCHLORIDE 0.5 MG: 2 INJECTION INTRAMUSCULAR; INTRAVENOUS; SUBCUTANEOUS at 09:57

## 2024-03-11 RX ADMIN — SODIUM CHLORIDE, POTASSIUM CHLORIDE, SODIUM LACTATE AND CALCIUM CHLORIDE: 600; 310; 30; 20 INJECTION, SOLUTION INTRAVENOUS at 07:13

## 2024-03-11 RX ADMIN — Medication 5 MG: at 08:33

## 2024-03-11 RX ADMIN — Medication 80 MCG: at 08:07

## 2024-03-11 RX ADMIN — WATER 2000 MG: 1 INJECTION INTRAMUSCULAR; INTRAVENOUS; SUBCUTANEOUS at 07:53

## 2024-03-11 RX ADMIN — SODIUM CHLORIDE, POTASSIUM CHLORIDE, SODIUM LACTATE AND CALCIUM CHLORIDE: 600; 310; 30; 20 INJECTION, SOLUTION INTRAVENOUS at 07:34

## 2024-03-11 RX ADMIN — ACETAMINOPHEN 1000 MG: 500 TABLET ORAL at 06:57

## 2024-03-11 RX ADMIN — HYDROMORPHONE HYDROCHLORIDE 0.25 MG: 1 INJECTION, SOLUTION INTRAMUSCULAR; INTRAVENOUS; SUBCUTANEOUS at 13:21

## 2024-03-11 ASSESSMENT — PAIN DESCRIPTION - ORIENTATION
ORIENTATION: LEFT;LOWER
ORIENTATION: LEFT;LOWER
ORIENTATION: LEFT
ORIENTATION: LEFT;LOWER
ORIENTATION: MID
ORIENTATION: LEFT;LOWER
ORIENTATION: LEFT;LOWER

## 2024-03-11 ASSESSMENT — PAIN DESCRIPTION - LOCATION
LOCATION: ABDOMEN
LOCATION: ABDOMEN;INCISION
LOCATION: ABDOMEN

## 2024-03-11 ASSESSMENT — PAIN SCALES - GENERAL
PAINLEVEL_OUTOF10: 6
PAINLEVEL_OUTOF10: 6
PAINLEVEL_OUTOF10: 7
PAINLEVEL_OUTOF10: 6
PAINLEVEL_OUTOF10: 6
PAINLEVEL_OUTOF10: 7
PAINLEVEL_OUTOF10: 7
PAINLEVEL_OUTOF10: 6
PAINLEVEL_OUTOF10: 0

## 2024-03-11 ASSESSMENT — PAIN DESCRIPTION - DESCRIPTORS
DESCRIPTORS: ACHING
DESCRIPTORS: SORE
DESCRIPTORS: ACHING

## 2024-03-11 ASSESSMENT — PAIN - FUNCTIONAL ASSESSMENT: PAIN_FUNCTIONAL_ASSESSMENT: 0-10

## 2024-03-11 ASSESSMENT — LIFESTYLE VARIABLES: SMOKING_STATUS: 1

## 2024-03-11 NOTE — FLOWSHEET NOTE
03/11/24 1019   Handoff   Communication Given Transfer Handoff   Handoff Given To Lucrecia DARDEN   Handoff Received From Rekha PRESTON/ Leoncio Sears RN   Handoff Communication Face to Face;At bedside   Time Handoff Given 1019     1350- Patient given eye glasses and cell olivia. Patient had been in communication with wife. Wife aware awaiting room assignment.

## 2024-03-11 NOTE — ANESTHESIA PRE PROCEDURE
discussed with patient whom consented to blood products.    Plan discussed with CRNA.                Gurvinder Augustin MD   3/11/2024

## 2024-03-11 NOTE — PERIOP NOTE
Fibrin Sealant TISSEEL, 10ml  SN: [id number]  LOT: [id number]  EXP: 07-    Used during procedure

## 2024-03-11 NOTE — INTERVAL H&P NOTE
Update History & Physical    The Patient's History and Physical attached was reviewed with the patient and I examined the patient.  There is no change.  The surgical site was confirmed by the patient and me.    Plan:  The risk, benefits, expected outcome, and alternative to the recommended procedure have been discussed with Dexter Flaherty.  He understands and wants to proceed with the procedure.

## 2024-03-11 NOTE — FLOWSHEET NOTE
03/11/24 1600   Handoff   Communication Given Transfer Handoff   Handoff Given To Chayito DARDEN   Handoff Received From Lucrecia DARDEN   Handoff Communication Telephone   Time Handoff Given 1600

## 2024-03-11 NOTE — OP NOTE
that was placed through the left rectus muscle was enlarged.  Nader wound protector was placed and the specimen extracted and sent to pathology.  The wound protector was removed and surgical gloves were changed.  The posterior sheath was closed with a running 0 Vicryl suture, and closure of the anterior sheath with a Stratafix Symmetric #1 PDS suture.  Pneumoperitoneum was again applied.  The abdomen was explored, copious irrigation was applied and was noted to run clear, complete hemostasis was noted.  Again, the anastomosis lying comfortably in the right upper quadrant without any tension.  The ports were removed sequentially under visualization and the pneumoperitoneum was allowed to escape.  The extraction sites soft tissue was approximated with a 3-0 Vicryl, followed by closure of all skin incisions with subcutaneous Monocryl and Dermabond.  The patient remained stable during my presence in the operating room.      RHONDA OROURKE MD

## 2024-03-11 NOTE — ANESTHESIA POSTPROCEDURE EVALUATION
Department of Anesthesiology  Postprocedure Note    Patient: Dexter Flaherty  MRN: [id number]  YOB: 1955  Date of evaluation: 3/11/2024    Procedure Summary       Date: 03/11/24 Room / Location: [facility] MAIN OR M9 / [facility] MAIN OR    Anesthesia Start: 0730 Anesthesia Stop: 1024    Procedure: ROBOT ASSISTED LAPAROSCOPIC RIGHT COLECTOMY (Right: Abdomen) Diagnosis:       Colon cancer (HCC)      (Colon cancer (HCC) [C18.9])    Providers: Mark Spear MD Responsible Provider: Gurvinder Augustin MD    Anesthesia Type: General ASA Status: 2            Anesthesia Type: General    Serena Phase I:      Serena Phase II:      Anesthesia Post Evaluation    Patient location during evaluation: PACU  Patient participation: complete - patient participated  Level of consciousness: sleepy but conscious and responsive to verbal stimuli  Pain score: 1  Airway patency: patent  Nausea & Vomiting: no vomiting and no nausea  Cardiovascular status: blood pressure returned to baseline and hemodynamically stable  Respiratory status: acceptable  Hydration status: stable  Multimodal analgesia pain management approach  Pain management: adequate    No notable events documented.

## 2024-03-12 VITALS
WEIGHT: 166.67 LBS | OXYGEN SATURATION: 96 % | SYSTOLIC BLOOD PRESSURE: 134 MMHG | TEMPERATURE: 98.1 F | HEIGHT: 70 IN | RESPIRATION RATE: 15 BRPM | HEART RATE: 59 BPM | BODY MASS INDEX: 23.86 KG/M2 | DIASTOLIC BLOOD PRESSURE: 53 MMHG

## 2024-03-12 LAB
ANION GAP SERPL CALC-SCNC: 3 MMOL/L (ref 5–15)
BUN SERPL-MCNC: 6 MG/DL (ref 6–20)
BUN/CREAT SERPL: 8 (ref 12–20)
CALCIUM SERPL-MCNC: 8 MG/DL (ref 8.5–10.1)
CHLORIDE SERPL-SCNC: 111 MMOL/L (ref 97–108)
CO2 SERPL-SCNC: 24 MMOL/L (ref 21–32)
CREAT SERPL-MCNC: 0.71 MG/DL (ref 0.7–1.3)
GLUCOSE BLD STRIP.AUTO-MCNC: 113 MG/DL (ref 65–117)
GLUCOSE BLD STRIP.AUTO-MCNC: 140 MG/DL (ref 65–117)
GLUCOSE SERPL-MCNC: 173 MG/DL (ref 65–100)
HCT VFR BLD AUTO: 31.5 % (ref 36.6–50.3)
HGB BLD-MCNC: 10.7 G/DL (ref 12.1–17)
POTASSIUM SERPL-SCNC: 3.9 MMOL/L (ref 3.5–5.1)
SERVICE CMNT-IMP: ABNORMAL
SERVICE CMNT-IMP: NORMAL
SODIUM SERPL-SCNC: 138 MMOL/L (ref 136–145)

## 2024-03-12 PROCEDURE — 80048 BASIC METABOLIC PNL TOTAL CA: CPT

## 2024-03-12 PROCEDURE — 6360000002 HC RX W HCPCS: Performed by: SURGERY

## 2024-03-12 PROCEDURE — 82962 GLUCOSE BLOOD TEST: CPT

## 2024-03-12 PROCEDURE — 6370000000 HC RX 637 (ALT 250 FOR IP): Performed by: SURGERY

## 2024-03-12 PROCEDURE — 85014 HEMATOCRIT: CPT

## 2024-03-12 PROCEDURE — 2500000003 HC RX 250 WO HCPCS: Performed by: SURGERY

## 2024-03-12 PROCEDURE — 85018 HEMOGLOBIN: CPT

## 2024-03-12 PROCEDURE — 36415 COLL VENOUS BLD VENIPUNCTURE: CPT

## 2024-03-12 PROCEDURE — 99024 POSTOP FOLLOW-UP VISIT: CPT | Performed by: NURSE PRACTITIONER

## 2024-03-12 RX ORDER — OXYCODONE HYDROCHLORIDE 5 MG/1
5 TABLET ORAL EVERY 6 HOURS PRN
Qty: 10 TABLET | Refills: 0 | Status: ON HOLD | OUTPATIENT
Start: 2024-03-12 | End: 2024-03-15

## 2024-03-12 RX ORDER — ONDANSETRON 4 MG/1
4 TABLET, ORALLY DISINTEGRATING ORAL 3 TIMES DAILY PRN
Qty: 21 TABLET | Refills: 0 | Status: ON HOLD | OUTPATIENT
Start: 2024-03-12

## 2024-03-12 RX ORDER — POLYETHYLENE GLYCOL 3350 17 G/17G
17 POWDER, FOR SOLUTION ORAL DAILY
Status: DISCONTINUED | OUTPATIENT
Start: 2024-03-12 | End: 2024-03-12 | Stop reason: HOSPADM

## 2024-03-12 RX ADMIN — AMLODIPINE BESYLATE 10 MG: 5 TABLET ORAL at 08:19

## 2024-03-12 RX ADMIN — KETOROLAC TROMETHAMINE 15 MG: 30 INJECTION, SOLUTION INTRAMUSCULAR at 08:20

## 2024-03-12 RX ADMIN — POLYETHYLENE GLYCOL 3350 17 G: 17 POWDER, FOR SOLUTION ORAL at 12:14

## 2024-03-12 RX ADMIN — KETOROLAC TROMETHAMINE 15 MG: 30 INJECTION, SOLUTION INTRAMUSCULAR at 02:50

## 2024-03-12 RX ADMIN — OXYCODONE HYDROCHLORIDE 10 MG: 5 TABLET ORAL at 14:08

## 2024-03-12 RX ADMIN — DOXAZOSIN 4 MG: 2 TABLET ORAL at 08:19

## 2024-03-12 RX ADMIN — POTASSIUM CHLORIDE, DEXTROSE MONOHYDRATE AND SODIUM CHLORIDE: 150; 5; 450 INJECTION, SOLUTION INTRAVENOUS at 02:54

## 2024-03-12 RX ADMIN — ENOXAPARIN SODIUM 40 MG: 100 INJECTION SUBCUTANEOUS at 08:20

## 2024-03-12 RX ADMIN — ROSUVASTATIN 20 MG: 20 TABLET, FILM COATED ORAL at 08:20

## 2024-03-12 RX ADMIN — OXYCODONE HYDROCHLORIDE 10 MG: 5 TABLET ORAL at 05:06

## 2024-03-12 RX ADMIN — CARVEDILOL 12.5 MG: 12.5 TABLET, FILM COATED ORAL at 08:20

## 2024-03-12 ASSESSMENT — PAIN SCALES - GENERAL
PAINLEVEL_OUTOF10: 3
PAINLEVEL_OUTOF10: 0
PAINLEVEL_OUTOF10: 7
PAINLEVEL_OUTOF10: 0
PAINLEVEL_OUTOF10: 0

## 2024-03-12 ASSESSMENT — PAIN DESCRIPTION - DESCRIPTORS: DESCRIPTORS: ACHING

## 2024-03-12 ASSESSMENT — PAIN DESCRIPTION - LOCATION: LOCATION: ABDOMEN

## 2024-03-12 ASSESSMENT — PAIN DESCRIPTION - ORIENTATION: ORIENTATION: ANTERIOR

## 2024-03-12 NOTE — PLAN OF CARE
Problem: Safety - Adult  Goal: Free from fall injury  Outcome: Progressing     Problem: ABCDS Injury Assessment  Goal: Absence of physical injury  Outcome: Progressing     Problem: Pain  Goal: Verbalizes/displays adequate comfort level or baseline comfort level  Outcome: Progressing     Problem: Discharge Planning  Goal: Discharge to home or other facility with appropriate resources  Outcome: Progressing

## 2024-03-12 NOTE — PROGRESS NOTES
1900 Bedside and Verbal shift change report given to Mike DARDEN (oncoming nurse) by Chayito RN (offgoing nurse). Report included the following information Nurse Handoff Report, MAR, and Recent Results.     0500 Wing Cath Remove.    End of Shift Note    Bedside shift change report given to Chayito DARDEN (oncoming nurse) by Mike Araiza RN (offgoing nurse).  Report included the following information SBAR, MAR, and Recent Results    Shift worked:  7p-7a     Shift summary and any significant changes:    Morning bloods collected and sent to lab     Concerns for physician to address:         Zone phone for oncoming shift:                                     
Value Ref Range    Sodium 138 136 - 145 mmol/L    Potassium 3.9 3.5 - 5.1 mmol/L    Chloride 111 (H) 97 - 108 mmol/L    CO2 24 21 - 32 mmol/L    Anion Gap 3 (L) 5 - 15 mmol/L    Glucose 173 (H) 65 - 100 mg/dL    BUN 6 6 - 20 MG/DL    Creatinine 0.71 0.70 - 1.30 MG/DL    Bun/Cre Ratio 8 (L) 12 - 20      Est, Glom Filt Rate >60 >60 ml/min/1.73m2    Calcium 8.0 (L) 8.5 - 10.1 MG/DL   Hemoglobin and Hematocrit    Collection Time: 03/12/24  2:49 AM   Result Value Ref Range    Hemoglobin 10.7 (L) 12.1 - 17.0 g/dL    Hematocrit 31.5 (L) 36.6 - 50.3 %   POCT Glucose    Collection Time: 03/12/24  6:21 AM   Result Value Ref Range    POC Glucose 140 (H) 65 - 117 mg/dL    Performed by: Daniel Menon PCT        Data Review

## 2024-03-12 NOTE — DISCHARGE INSTRUCTIONS
backpack, cat litter or dog food bags, a child, or a vacuum   You may drive when you are no longer taking pain medicine and can quickly move your foot from the gas pedal to the brake. You must also be able to sit comfortably for a long period of time, even if you do not plan to go far.   You will probably need to take 2 to 3 weeks off from work. It depends on the type of work you do and how you feel.  You may shower.  Pat the cut (incision) dry. Do not take a bath for the first 2 weeks, or until your doctor tells you it is okay.    Diet  You may not have much appetite after the surgery. But try to eat a healthy diet. Your doctor will tell you about any foods you should not eat.  Eat a low-fiber diet (see \"Low Fiber Diet\" below) for several weeks after surgery. Eat many small meals throughout the day. Add high-fiber foods a little at a time.  Eat yogurt. It puts good bacteria into your colon and helps prevent diarrhea.  Try to avoid nuts, seeds, and corn for a while. They may be hard to digest.  You may need to take vitamins that contain sodium and potassium. Your doctor will tell you whether you should take any vitamins or supplements.  Drink plenty of fluids to prevent dehydration. Choose water and other caffeine-free clear liquids until you feel better. If you have kidney, heart, or liver disease and have to limit fluids, talk with your doctor before you increase the amount of fluids you drink.    Medicines  Take pain medicines exactly as directed.  If you are not taking a prescription pain medicine, take an over-the-counter medicine such as acetaminophen (Tylenol), ibuprofen (Advil, Motrin), or naproxen (Aleve). Read and follow all instructions on the label.  Do not take two or more pain medicines at the same time unless the doctor told you to. Many pain medicines have acetaminophen, which is Tylenol. Too much acetaminophen (Tylenol) can be harmful.  If your doctor prescribed antibiotics, take them as

## 2024-03-12 NOTE — DISCHARGE SUMMARY
Post- Surgical Discharge Summary    Patient ID:  Dexter Flaherty  [id number]  male  68 y.o.  1955    Admit date: 3/11/2024    Discharge date: 03/12/24     Admitting Physician: Mark Spear MD     Date of Surgery:   3/11/2024     Preoperative Diagnosis:  Colon cancer (HCC) [C18.9]    Postoperative Diagnosis:   * No post-op diagnosis entered *    Procedure(s):  ROBOT ASSISTED LAPAROSCOPIC RIGHT COLECTOMY     Anesthesia Type:   General     Surgeon: Mark Spear MD                            HPI:  Pt is a 68 y.o. male who has a history of Colon cancer (HCC) [C18.9] who presents at this time for a right colectomy.    Problem List:   Patient Active Problem List   Diagnosis    Status post left hip replacement    Colon cancer (HCC)        Hospital Course:  The patient underwent surgery. Intra-operative complications: None; patient tolerated the procedure well. Was taken to the PACU in stable condition and then transferred to the surgical floor.      Pathology is pending. Surgeon will follow results as outpatient.    Perioperative Antibiotics: Cefazolin    Postoperative Pain Management:  Oxycodone     Postoperative transfusions:    Number of units banked PRBCs =   none     Post Op complications: None     Incisions  - clean, dry and intact. No significant erythema or swelling. Wound(s) appear to be healing without any evidence of infection.      Patient mobilized with nursing and was found to be safe and steady with ambulation.     Discharged to: Home     Condition on Discharge: Stable     Discharge instructions:    - Take pain medications as prescribed  - Diet Low Fiber  - Discharge activity:    - Activity as tolerated    - Ambulate several times a day   - No heavy lifting for 4 weeks   - Do not drive for two weeks or while on opioid pain medications  - Wound Care: Keep wound(s) clean and dry. See discharge instruction sheet.    Allergies:    Allergies   Allergen Reactions    Ace Inhibitors Anaphylaxis, Shortness

## 2024-03-12 NOTE — CARE COORDINATION
Resource Information Provided? No   Mode of Transport at Discharge Other (see comment)  (wife in the room and will transport home)   Confirm Follow Up Transport Family   Condition of Participation: Discharge Planning   The Plan for Transition of Care is related to the following treatment goals: Patient to return home with his wife to continue to recuperate   The Patient and/or Patient Representative was provided with a Choice of Provider?   (na)   Freedom of Choice list was provided with basic dialogue that supports the patient's individualized plan of care/goals, treatment preferences, and shares the quality data associated with the providers?    (na)     Mariposa Aldridge RN  Care Manager  [phone]

## 2024-03-15 ENCOUNTER — HOSPITAL ENCOUNTER (INPATIENT)
Facility: HOSPITAL | Age: 69
LOS: 6 days | Discharge: HOME | DRG: 394 | End: 2024-03-24
Attending: SURGERY | Admitting: SURGERY
Payer: MEDICARE

## 2024-03-15 ENCOUNTER — APPOINTMENT (OUTPATIENT)
Facility: HOSPITAL | Age: 69
DRG: 394 | End: 2024-03-15
Payer: MEDICARE

## 2024-03-15 ENCOUNTER — TELEPHONE (OUTPATIENT)
Age: 69
End: 2024-03-15

## 2024-03-15 DIAGNOSIS — R33.8 ACUTE URINARY RETENTION: ICD-10-CM

## 2024-03-15 DIAGNOSIS — K56.7 ILEUS (HCC): Primary | ICD-10-CM

## 2024-03-15 LAB
ALBUMIN SERPL-MCNC: 3.3 G/DL (ref 3.5–5)
ALBUMIN/GLOB SERPL: 1.1 (ref 1.1–2.2)
ALP SERPL-CCNC: 54 U/L (ref 45–117)
ALT SERPL-CCNC: 28 U/L (ref 12–78)
ANION GAP SERPL CALC-SCNC: 4 MMOL/L (ref 5–15)
APPEARANCE UR: CLEAR
AST SERPL-CCNC: 20 U/L (ref 15–37)
BACTERIA URNS QL MICRO: NEGATIVE /HPF
BASOPHILS # BLD: 0 K/UL (ref 0–0.1)
BASOPHILS NFR BLD: 0 % (ref 0–1)
BILIRUB SERPL-MCNC: 0.5 MG/DL (ref 0.2–1)
BILIRUB UR QL: NEGATIVE
BUN SERPL-MCNC: 20 MG/DL (ref 6–20)
BUN/CREAT SERPL: 24 (ref 12–20)
CALCIUM SERPL-MCNC: 8.9 MG/DL (ref 8.5–10.1)
CHLORIDE SERPL-SCNC: 108 MMOL/L (ref 97–108)
CO2 SERPL-SCNC: 27 MMOL/L (ref 21–32)
COLOR UR: NORMAL
CREAT SERPL-MCNC: 0.85 MG/DL (ref 0.7–1.3)
DIFFERENTIAL METHOD BLD: ABNORMAL
EOSINOPHIL # BLD: 0.1 K/UL (ref 0–0.4)
EOSINOPHIL NFR BLD: 1 % (ref 0–7)
EPITH CASTS URNS QL MICRO: NORMAL /LPF
ERYTHROCYTE [DISTWIDTH] IN BLOOD BY AUTOMATED COUNT: 13.2 % (ref 11.5–14.5)
GLOBULIN SER CALC-MCNC: 3 G/DL (ref 2–4)
GLUCOSE SERPL-MCNC: 119 MG/DL (ref 65–100)
GLUCOSE UR STRIP.AUTO-MCNC: NEGATIVE MG/DL
HCT VFR BLD AUTO: 37.9 % (ref 36.6–50.3)
HGB BLD-MCNC: 12.2 G/DL (ref 12.1–17)
HGB UR QL STRIP: NEGATIVE
HYALINE CASTS URNS QL MICRO: NORMAL /LPF (ref 0–2)
IMM GRANULOCYTES # BLD AUTO: 0.1 K/UL (ref 0–0.04)
IMM GRANULOCYTES NFR BLD AUTO: 0 % (ref 0–0.5)
KETONES UR QL STRIP.AUTO: NEGATIVE MG/DL
LEUKOCYTE ESTERASE UR QL STRIP.AUTO: NEGATIVE
LYMPHOCYTES # BLD: 1.6 K/UL (ref 0.8–3.5)
LYMPHOCYTES NFR BLD: 13 % (ref 12–49)
MCH RBC QN AUTO: 30 PG (ref 26–34)
MCHC RBC AUTO-ENTMCNC: 32.2 G/DL (ref 30–36.5)
MCV RBC AUTO: 93.3 FL (ref 80–99)
MONOCYTES # BLD: 1 K/UL (ref 0–1)
MONOCYTES NFR BLD: 7 % (ref 5–13)
NEUTS SEG # BLD: 10.1 K/UL (ref 1.8–8)
NEUTS SEG NFR BLD: 79 % (ref 32–75)
NITRITE UR QL STRIP.AUTO: NEGATIVE
NRBC # BLD: 0 K/UL (ref 0–0.01)
NRBC BLD-RTO: 0 PER 100 WBC
PH UR STRIP: 6 (ref 5–8)
PLATELET # BLD AUTO: 150 K/UL (ref 150–400)
PMV BLD AUTO: 10.6 FL (ref 8.9–12.9)
POTASSIUM SERPL-SCNC: 4.1 MMOL/L (ref 3.5–5.1)
PROT SERPL-MCNC: 6.3 G/DL (ref 6.4–8.2)
PROT UR STRIP-MCNC: NEGATIVE MG/DL
RBC # BLD AUTO: 4.06 M/UL (ref 4.1–5.7)
RBC #/AREA URNS HPF: NORMAL /HPF (ref 0–5)
SODIUM SERPL-SCNC: 139 MMOL/L (ref 136–145)
SP GR UR REFRACTOMETRY: 1.01 (ref 1–1.03)
TROPONIN I SERPL HS-MCNC: 5 NG/L (ref 0–76)
URINE CULTURE IF INDICATED: NORMAL
UROBILINOGEN UR QL STRIP.AUTO: 0.2 EU/DL (ref 0.2–1)
WBC # BLD AUTO: 12.9 K/UL (ref 4.1–11.1)
WBC URNS QL MICRO: NORMAL /HPF (ref 0–4)

## 2024-03-15 PROCEDURE — G0378 HOSPITAL OBSERVATION PER HR: HCPCS

## 2024-03-15 PROCEDURE — 6360000004 HC RX CONTRAST MEDICATION: Performed by: PHYSICIAN ASSISTANT

## 2024-03-15 PROCEDURE — 80053 COMPREHEN METABOLIC PANEL: CPT

## 2024-03-15 PROCEDURE — 81001 URINALYSIS AUTO W/SCOPE: CPT

## 2024-03-15 PROCEDURE — 96375 TX/PRO/DX INJ NEW DRUG ADDON: CPT

## 2024-03-15 PROCEDURE — 84484 ASSAY OF TROPONIN QUANT: CPT

## 2024-03-15 PROCEDURE — 96374 THER/PROPH/DIAG INJ IV PUSH: CPT

## 2024-03-15 PROCEDURE — 6360000002 HC RX W HCPCS: Performed by: SURGERY

## 2024-03-15 PROCEDURE — 2500000003 HC RX 250 WO HCPCS: Performed by: SURGERY

## 2024-03-15 PROCEDURE — 51798 US URINE CAPACITY MEASURE: CPT

## 2024-03-15 PROCEDURE — 6370000000 HC RX 637 (ALT 250 FOR IP): Performed by: SURGERY

## 2024-03-15 PROCEDURE — 99285 EMERGENCY DEPT VISIT HI MDM: CPT

## 2024-03-15 PROCEDURE — 96376 TX/PRO/DX INJ SAME DRUG ADON: CPT

## 2024-03-15 PROCEDURE — 85025 COMPLETE CBC W/AUTO DIFF WBC: CPT

## 2024-03-15 PROCEDURE — 96372 THER/PROPH/DIAG INJ SC/IM: CPT

## 2024-03-15 PROCEDURE — 93005 ELECTROCARDIOGRAM TRACING: CPT | Performed by: PHYSICIAN ASSISTANT

## 2024-03-15 PROCEDURE — 71045 X-RAY EXAM CHEST 1 VIEW: CPT

## 2024-03-15 PROCEDURE — 6360000002 HC RX W HCPCS: Performed by: PHYSICIAN ASSISTANT

## 2024-03-15 PROCEDURE — 96361 HYDRATE IV INFUSION ADD-ON: CPT

## 2024-03-15 PROCEDURE — 36415 COLL VENOUS BLD VENIPUNCTURE: CPT

## 2024-03-15 PROCEDURE — 74177 CT ABD & PELVIS W/CONTRAST: CPT

## 2024-03-15 PROCEDURE — 51702 INSERT TEMP BLADDER CATH: CPT

## 2024-03-15 RX ORDER — SODIUM CHLORIDE 9 MG/ML
INJECTION, SOLUTION INTRAVENOUS PRN
Status: DISCONTINUED | OUTPATIENT
Start: 2024-03-15 | End: 2024-03-24 | Stop reason: HOSPADM

## 2024-03-15 RX ORDER — OXYCODONE HYDROCHLORIDE 5 MG/1
5 TABLET ORAL EVERY 4 HOURS PRN
Status: DISCONTINUED | OUTPATIENT
Start: 2024-03-15 | End: 2024-03-24 | Stop reason: HOSPADM

## 2024-03-15 RX ORDER — HYDROMORPHONE HYDROCHLORIDE 1 MG/ML
0.25 INJECTION, SOLUTION INTRAMUSCULAR; INTRAVENOUS; SUBCUTANEOUS
Status: DISCONTINUED | OUTPATIENT
Start: 2024-03-15 | End: 2024-03-24 | Stop reason: HOSPADM

## 2024-03-15 RX ORDER — ONDANSETRON 4 MG/1
4 TABLET, ORALLY DISINTEGRATING ORAL EVERY 8 HOURS PRN
Status: DISCONTINUED | OUTPATIENT
Start: 2024-03-15 | End: 2024-03-24 | Stop reason: HOSPADM

## 2024-03-15 RX ORDER — DOXAZOSIN 2 MG/1
4 TABLET ORAL DAILY
Status: DISCONTINUED | OUTPATIENT
Start: 2024-03-15 | End: 2024-03-24 | Stop reason: HOSPADM

## 2024-03-15 RX ORDER — ENOXAPARIN SODIUM 100 MG/ML
40 INJECTION SUBCUTANEOUS DAILY
Status: DISCONTINUED | OUTPATIENT
Start: 2024-03-15 | End: 2024-03-24 | Stop reason: HOSPADM

## 2024-03-15 RX ORDER — SODIUM CHLORIDE 0.9 % (FLUSH) 0.9 %
5-40 SYRINGE (ML) INJECTION PRN
Status: DISCONTINUED | OUTPATIENT
Start: 2024-03-15 | End: 2024-03-24 | Stop reason: HOSPADM

## 2024-03-15 RX ORDER — OXYCODONE HYDROCHLORIDE 5 MG/1
10 TABLET ORAL EVERY 4 HOURS PRN
Status: DISCONTINUED | OUTPATIENT
Start: 2024-03-15 | End: 2024-03-24 | Stop reason: HOSPADM

## 2024-03-15 RX ORDER — MORPHINE SULFATE 2 MG/ML
2 INJECTION, SOLUTION INTRAMUSCULAR; INTRAVENOUS
Status: COMPLETED | OUTPATIENT
Start: 2024-03-15 | End: 2024-03-15

## 2024-03-15 RX ORDER — CARVEDILOL 12.5 MG/1
12.5 TABLET ORAL 2 TIMES DAILY WITH MEALS
Status: DISCONTINUED | OUTPATIENT
Start: 2024-03-15 | End: 2024-03-24 | Stop reason: HOSPADM

## 2024-03-15 RX ORDER — DEXTROSE MONOHYDRATE, SODIUM CHLORIDE, AND POTASSIUM CHLORIDE 50; 1.49; 4.5 G/1000ML; G/1000ML; G/1000ML
INJECTION, SOLUTION INTRAVENOUS CONTINUOUS
Status: DISCONTINUED | OUTPATIENT
Start: 2024-03-15 | End: 2024-03-21

## 2024-03-15 RX ORDER — ONDANSETRON 2 MG/ML
4 INJECTION INTRAMUSCULAR; INTRAVENOUS EVERY 6 HOURS PRN
Status: DISCONTINUED | OUTPATIENT
Start: 2024-03-15 | End: 2024-03-24 | Stop reason: HOSPADM

## 2024-03-15 RX ORDER — HYDROMORPHONE HYDROCHLORIDE 1 MG/ML
0.5 INJECTION, SOLUTION INTRAMUSCULAR; INTRAVENOUS; SUBCUTANEOUS
Status: DISCONTINUED | OUTPATIENT
Start: 2024-03-15 | End: 2024-03-24 | Stop reason: HOSPADM

## 2024-03-15 RX ORDER — ONDANSETRON 2 MG/ML
4 INJECTION INTRAMUSCULAR; INTRAVENOUS ONCE
Status: COMPLETED | OUTPATIENT
Start: 2024-03-15 | End: 2024-03-15

## 2024-03-15 RX ORDER — ENOXAPARIN SODIUM 100 MG/ML
40 INJECTION SUBCUTANEOUS DAILY
Qty: 11.2 ML | Refills: 0 | Status: SHIPPED | OUTPATIENT
Start: 2024-03-16 | End: 2024-04-13

## 2024-03-15 RX ORDER — AMLODIPINE BESYLATE 5 MG/1
10 TABLET ORAL DAILY
Status: DISCONTINUED | OUTPATIENT
Start: 2024-03-15 | End: 2024-03-24 | Stop reason: HOSPADM

## 2024-03-15 RX ORDER — ACETAMINOPHEN 325 MG/1
650 TABLET ORAL EVERY 4 HOURS PRN
Status: DISCONTINUED | OUTPATIENT
Start: 2024-03-15 | End: 2024-03-24 | Stop reason: HOSPADM

## 2024-03-15 RX ORDER — HYDRALAZINE HYDROCHLORIDE 20 MG/ML
10 INJECTION INTRAMUSCULAR; INTRAVENOUS EVERY 6 HOURS PRN
Status: DISCONTINUED | OUTPATIENT
Start: 2024-03-15 | End: 2024-03-24 | Stop reason: HOSPADM

## 2024-03-15 RX ORDER — SODIUM CHLORIDE 0.9 % (FLUSH) 0.9 %
5-40 SYRINGE (ML) INJECTION EVERY 12 HOURS SCHEDULED
Status: DISCONTINUED | OUTPATIENT
Start: 2024-03-15 | End: 2024-03-24 | Stop reason: HOSPADM

## 2024-03-15 RX ADMIN — POTASSIUM CHLORIDE, DEXTROSE MONOHYDRATE AND SODIUM CHLORIDE: 150; 5; 450 INJECTION, SOLUTION INTRAVENOUS at 23:21

## 2024-03-15 RX ADMIN — HYDROMORPHONE HYDROCHLORIDE 0.5 MG: 1 INJECTION, SOLUTION INTRAMUSCULAR; INTRAVENOUS; SUBCUTANEOUS at 19:56

## 2024-03-15 RX ADMIN — ONDANSETRON 4 MG: 2 INJECTION INTRAMUSCULAR; INTRAVENOUS at 12:48

## 2024-03-15 RX ADMIN — ONDANSETRON 4 MG: 2 INJECTION INTRAMUSCULAR; INTRAVENOUS at 09:51

## 2024-03-15 RX ADMIN — IOHEXOL 50 ML: 240 INJECTION, SOLUTION INTRATHECAL; INTRAVASCULAR; INTRAVENOUS; ORAL at 09:32

## 2024-03-15 RX ADMIN — HYDROMORPHONE HYDROCHLORIDE 0.5 MG: 1 INJECTION, SOLUTION INTRAMUSCULAR; INTRAVENOUS; SUBCUTANEOUS at 16:03

## 2024-03-15 RX ADMIN — MORPHINE SULFATE 2 MG: 2 INJECTION, SOLUTION INTRAMUSCULAR; INTRAVENOUS at 09:51

## 2024-03-15 RX ADMIN — HYDROMORPHONE HYDROCHLORIDE 0.25 MG: 1 INJECTION, SOLUTION INTRAMUSCULAR; INTRAVENOUS; SUBCUTANEOUS at 12:48

## 2024-03-15 RX ADMIN — AMLODIPINE BESYLATE 10 MG: 5 TABLET ORAL at 15:00

## 2024-03-15 RX ADMIN — IOPAMIDOL 100 ML: 755 INJECTION, SOLUTION INTRAVENOUS at 09:32

## 2024-03-15 RX ADMIN — ENOXAPARIN SODIUM 40 MG: 100 INJECTION SUBCUTANEOUS at 12:48

## 2024-03-15 RX ADMIN — POTASSIUM CHLORIDE, DEXTROSE MONOHYDRATE AND SODIUM CHLORIDE: 150; 5; 450 INJECTION, SOLUTION INTRAVENOUS at 12:47

## 2024-03-15 RX ADMIN — CARVEDILOL 12.5 MG: 12.5 TABLET, FILM COATED ORAL at 17:15

## 2024-03-15 ASSESSMENT — PAIN DESCRIPTION - LOCATION
LOCATION: ABDOMEN

## 2024-03-15 ASSESSMENT — PAIN SCALES - GENERAL
PAINLEVEL_OUTOF10: 7
PAINLEVEL_OUTOF10: 8
PAINLEVEL_OUTOF10: 8
PAINLEVEL_OUTOF10: 6

## 2024-03-15 ASSESSMENT — PAIN - FUNCTIONAL ASSESSMENT: PAIN_FUNCTIONAL_ASSESSMENT: 0-10

## 2024-03-15 ASSESSMENT — PAIN DESCRIPTION - ORIENTATION: ORIENTATION: MID;RIGHT

## 2024-03-15 ASSESSMENT — PAIN DESCRIPTION - DESCRIPTORS: DESCRIPTORS: ACHING

## 2024-03-15 NOTE — H&P
HISTORY AND PHYSICAL             Date: 3/15/2024        Patient Name: Dexter Flaherty     YOB: 1955      Age:  68 y.o.    Chief Complaint     Nausea, vomiting, distention    History Obtained From   patient    History of Present Illness   69 yo POD#4 s/p right colectomy for a colon adenocarcinoma involving the hepatic flexure.  He initially did well but yesterday developed increasing distention with nausea and vomiting.  He has been taking Tylenol for pain.  Tolerated just a small amount of liquids yesterday.  Small BM this morning.  History of BPH.  On Cardura.  Feels like he may not be emptying completely.      Past Medical History     Past Medical History:   Diagnosis Date    Alcohol abuse     Arthritis     hip    At risk for sleep apnea 02/2024    ZOILA 4. STOP BANG tool form faxed to PCP    Glaucoma     History of colonic polyps     Hypertension     Lower abdominal pain     Melena     Vocal cord mass         Past Surgical History     Past Surgical History:   Procedure Laterality Date    COLECTOMY  03/11/2024    Laparoscopic right colectomy with intracorporeal anastomosis, robot assisted  performed by Beatris at [facility] MAIN    COLONOSCOPY N/A 10/24/2016    COLONOSCOPY performed by aRf Gastelum MD at [facility] ENDOSCOPY    COLONOSCOPY N/A 02/13/2024    COLONOSCOPY performed by Raf Gastelum MD at [facility] ENDOSCOPY    COLONOSCOPY,DIAGNOSTIC  10/24/2016         COLSC FLX W/RMVL OF TUMOR POLYP LESION SNARE TQ  4/19/2012         ABBY  4-13-15    LARYNGOSCOPY, BX and removal x 2    HERNIA REPAIR      2 sgys, 1 each side as infant and teenager    JOINT REPLACEMENT Left 02/13/2019    Hip    OTHER SURGICAL HISTORY      colonoscopy/polyp    SMALL INTESTINE SURGERY Right 3/11/2024    ROBOT ASSISTED LAPAROSCOPIC RIGHT COLECTOMY performed by Mark Spear MD at [facility] MAIN OR    TONSILLECTOMY          Medications Prior to Admission     Prior to Admission medications    Medication Sig Start Date End Date Taking? Authorizing

## 2024-03-15 NOTE — CONSULTS
needed for Nausea or Vomiting   oxyCODONE (ROXICODONE) 5 MG immediate release tablet   No No   Sig: Take 1 tablet by mouth every 6 hours as needed for Pain for up to 3 days. Max Daily Amount: 20 mg   rosuvastatin (CRESTOR) 40 MG tablet   Yes No   Sig: Take 0.5 tablets by mouth 2 times daily      Facility-Administered Medications: None     Hospital medications:  Current Facility-Administered Medications   Medication Dose Route Frequency    sodium chloride flush 0.9 % injection 5-40 mL  5-40 mL IntraVENous 2 times per day    sodium chloride flush 0.9 % injection 5-40 mL  5-40 mL IntraVENous PRN    0.9 % sodium chloride infusion   IntraVENous PRN    ondansetron (ZOFRAN-ODT) disintegrating tablet 4 mg  4 mg Oral Q8H PRN    Or    ondansetron (ZOFRAN) injection 4 mg  4 mg IntraVENous Q6H PRN    dextrose 5 % and 0.45 % NaCl with KCl 20 mEq infusion   IntraVENous Continuous    acetaminophen (TYLENOL) tablet 650 mg  650 mg Oral Q4H PRN    oxyCODONE (ROXICODONE) immediate release tablet 5 mg  5 mg Oral Q4H PRN    Or    oxyCODONE (ROXICODONE) immediate release tablet 10 mg  10 mg Oral Q4H PRN    HYDROmorphone HCl PF (DILAUDID) injection 0.25 mg  0.25 mg IntraVENous Q3H PRN    Or    HYDROmorphone HCl PF (DILAUDID) injection 0.5 mg  0.5 mg IntraVENous Q3H PRN    enoxaparin (LOVENOX) injection 40 mg  40 mg SubCUTAneous Daily    hydrALAZINE (APRESOLINE) injection 10 mg  10 mg IntraVENous Q6H PRN     Current Outpatient Medications   Medication Sig    oxyCODONE (ROXICODONE) 5 MG immediate release tablet Take 1 tablet by mouth every 6 hours as needed for Pain for up to 3 days. Max Daily Amount: 20 mg    ondansetron (ZOFRAN-ODT) 4 MG disintegrating tablet Take 1 tablet by mouth 3 times daily as needed for Nausea or Vomiting    metFORMIN (GLUCOPHAGE) 500 MG tablet Take 1 tablet by mouth 2 times daily (with meals) (Patient not taking: Reported on 3/11/2024)    DORZOLAMIDE HCL-TIMOLOL MAL OP Apply 2 drops to eye 2 times daily    Ferrous

## 2024-03-15 NOTE — CARE COORDINATION
Transition of Care Plan:    RUR: OBS  Prior Level of Functioning:   Disposition: Home w/family  If SNF or IPR: Date FOC offered:   Date FOC received:   Accepting facility:   Date authorization started with reference number:   Date authorization received and expires:   Follow up appointments:   DME needed: n/a  Transportation at discharge: wife  IM/IMM Medicare/ letter given: n/a  Is patient a  and connected with [facility]?    If yes, was [place] transfer form completed and [facility] notified?   Caregiver Contact: wife at bedside  Discharge Caregiver contacted prior to discharge?   Care Conference needed?   Barriers to discharge:     OBS letter signed by wife & CM provided copy. No needs or concerns identified at this time.    Saundra Ortiz  Ext [phone]

## 2024-03-15 NOTE — ED PROVIDER NOTES
modulation. FINDINGS: LOWER THORAX: No significant abnormality in the incidentally imaged lower chest. LIVER: No mass. BILIARY TREE: Gallbladder is within normal limits. CBD is not dilated. SPLEEN: within normal limits. PANCREAS: No mass or ductal dilatation. ADRENALS: Unremarkable. KIDNEYS: Multiple bilateral renal cysts, with a few areas of bilateral renal cortical scarring. STOMACH: Unremarkable. SMALL BOWEL: Diffuse dilation of the proximal to mid small bowel, with some air-fluid levels. The distal small bowel has some fluid in it, although there are multiple segments of mucosal thickening. Ileocolic anastomosis is patent. COLON: Partial colon resection. Mild diverticulosis without evidence of diverticulitis. APPENDIX: Surgically absent. PERITONEUM: No ascites or pneumoperitoneum. RETROPERITONEUM: No lymphadenopathy or aortic aneurysm. REPRODUCTIVE ORGANS: Prostatomegaly. URINARY BLADDER: No mass or calculus. BONES: Left hip total arthroplasty. ABDOMINAL WALL: Extensive subcutaneous edema in the left flank without fluid collection. ADDITIONAL COMMENTS: N/A     1. Diffuse small bowel dilation, with multiple segments of mucosal thickening. No abrupt transition point. Overall findings are compatible with nonspecific multifocal enteritis, with associated ileus.      PROCEDURES   Unless otherwise noted below, none  Procedures     CRITICAL CARE TIME   Patient does not meet Critical Care Time, 0 minutes     EMERGENCY DEPARTMENT COURSE and DIFFERENTIAL DIAGNOSIS/MDM   Vitals:    Vitals:    03/15/24 1230 03/15/24 1248 03/15/24 1300 03/15/24 1404   BP: 132/64   127/68   Pulse: 76   70   Resp: 14   20   Temp:    97.9 °F (36.6 °C)   TempSrc:    Oral   SpO2: 92% (!) 87% 96% 98%   Weight:       Height:            Patient was given the following medications:  Medications   sodium chloride flush 0.9 % injection 5-40 mL (has no administration in time range)   sodium chloride flush 0.9 % injection 5-40 mL (has no administration

## 2024-03-15 NOTE — TELEPHONE ENCOUNTER
Pt called. Was only able to have some broth and a banana yesterday. Vomiting this morning  Taking tylenol for pain and tried zofran. +small BM this morning. Still nauseated. Distended.    Asked him to go to the ER for evaluation. Bladder distention? Ileus? Leak?

## 2024-03-16 LAB
ANION GAP SERPL CALC-SCNC: 3 MMOL/L (ref 5–15)
BASOPHILS # BLD: 0 K/UL (ref 0–0.1)
BASOPHILS NFR BLD: 0 % (ref 0–1)
BUN SERPL-MCNC: 15 MG/DL (ref 6–20)
BUN/CREAT SERPL: 23 (ref 12–20)
CALCIUM SERPL-MCNC: 8.3 MG/DL (ref 8.5–10.1)
CHLORIDE SERPL-SCNC: 107 MMOL/L (ref 97–108)
CO2 SERPL-SCNC: 26 MMOL/L (ref 21–32)
CREAT SERPL-MCNC: 0.66 MG/DL (ref 0.7–1.3)
DIFFERENTIAL METHOD BLD: ABNORMAL
EKG ATRIAL RATE: 75 BPM
EKG DIAGNOSIS: NORMAL
EKG P AXIS: 76 DEGREES
EKG P-R INTERVAL: 184 MS
EKG Q-T INTERVAL: 400 MS
EKG QRS DURATION: 92 MS
EKG QTC CALCULATION (BAZETT): 446 MS
EKG R AXIS: 76 DEGREES
EKG T AXIS: 76 DEGREES
EKG VENTRICULAR RATE: 75 BPM
EOSINOPHIL # BLD: 0.2 K/UL (ref 0–0.4)
EOSINOPHIL NFR BLD: 3 % (ref 0–7)
ERYTHROCYTE [DISTWIDTH] IN BLOOD BY AUTOMATED COUNT: 13.1 % (ref 11.5–14.5)
GLUCOSE SERPL-MCNC: 140 MG/DL (ref 65–100)
HCT VFR BLD AUTO: 33.6 % (ref 36.6–50.3)
HGB BLD-MCNC: 10.9 G/DL (ref 12.1–17)
IMM GRANULOCYTES # BLD AUTO: 0 K/UL (ref 0–0.04)
IMM GRANULOCYTES NFR BLD AUTO: 0 % (ref 0–0.5)
LYMPHOCYTES # BLD: 1.4 K/UL (ref 0.8–3.5)
LYMPHOCYTES NFR BLD: 15 % (ref 12–49)
MCH RBC QN AUTO: 31.1 PG (ref 26–34)
MCHC RBC AUTO-ENTMCNC: 32.4 G/DL (ref 30–36.5)
MCV RBC AUTO: 96 FL (ref 80–99)
MONOCYTES # BLD: 1 K/UL (ref 0–1)
MONOCYTES NFR BLD: 11 % (ref 5–13)
NEUTS SEG # BLD: 6.3 K/UL (ref 1.8–8)
NEUTS SEG NFR BLD: 71 % (ref 32–75)
NRBC # BLD: 0 K/UL (ref 0–0.01)
NRBC BLD-RTO: 0 PER 100 WBC
PLATELET # BLD AUTO: 118 K/UL (ref 150–400)
PMV BLD AUTO: 11 FL (ref 8.9–12.9)
POTASSIUM SERPL-SCNC: 4 MMOL/L (ref 3.5–5.1)
RBC # BLD AUTO: 3.5 M/UL (ref 4.1–5.7)
SODIUM SERPL-SCNC: 136 MMOL/L (ref 136–145)
WBC # BLD AUTO: 9 K/UL (ref 4.1–11.1)

## 2024-03-16 PROCEDURE — G0378 HOSPITAL OBSERVATION PER HR: HCPCS

## 2024-03-16 PROCEDURE — 85025 COMPLETE CBC W/AUTO DIFF WBC: CPT

## 2024-03-16 PROCEDURE — 96376 TX/PRO/DX INJ SAME DRUG ADON: CPT

## 2024-03-16 PROCEDURE — 96361 HYDRATE IV INFUSION ADD-ON: CPT

## 2024-03-16 PROCEDURE — 2580000003 HC RX 258: Performed by: SURGERY

## 2024-03-16 PROCEDURE — 2500000003 HC RX 250 WO HCPCS: Performed by: SURGERY

## 2024-03-16 PROCEDURE — 80048 BASIC METABOLIC PNL TOTAL CA: CPT

## 2024-03-16 PROCEDURE — 96372 THER/PROPH/DIAG INJ SC/IM: CPT

## 2024-03-16 PROCEDURE — 6360000002 HC RX W HCPCS: Performed by: SURGERY

## 2024-03-16 PROCEDURE — 36415 COLL VENOUS BLD VENIPUNCTURE: CPT

## 2024-03-16 PROCEDURE — 6370000000 HC RX 637 (ALT 250 FOR IP): Performed by: SURGERY

## 2024-03-16 PROCEDURE — 6370000000 HC RX 637 (ALT 250 FOR IP): Performed by: STUDENT IN AN ORGANIZED HEALTH CARE EDUCATION/TRAINING PROGRAM

## 2024-03-16 RX ORDER — CALCIUM CARBONATE 500 MG/1
1000 TABLET, CHEWABLE ORAL 3 TIMES DAILY PRN
Status: DISCONTINUED | OUTPATIENT
Start: 2024-03-16 | End: 2024-03-24 | Stop reason: HOSPADM

## 2024-03-16 RX ADMIN — DOXAZOSIN 4 MG: 2 TABLET ORAL at 08:51

## 2024-03-16 RX ADMIN — HYDROMORPHONE HYDROCHLORIDE 0.5 MG: 1 INJECTION, SOLUTION INTRAMUSCULAR; INTRAVENOUS; SUBCUTANEOUS at 01:56

## 2024-03-16 RX ADMIN — CALCIUM CARBONATE (ANTACID) CHEW TAB 500 MG 1000 MG: 500 CHEW TAB at 12:14

## 2024-03-16 RX ADMIN — OXYCODONE 5 MG: 5 TABLET ORAL at 08:51

## 2024-03-16 RX ADMIN — CARVEDILOL 12.5 MG: 12.5 TABLET, FILM COATED ORAL at 17:05

## 2024-03-16 RX ADMIN — HYDROMORPHONE HYDROCHLORIDE 0.5 MG: 1 INJECTION, SOLUTION INTRAMUSCULAR; INTRAVENOUS; SUBCUTANEOUS at 12:20

## 2024-03-16 RX ADMIN — HYDROMORPHONE HYDROCHLORIDE 0.5 MG: 1 INJECTION, SOLUTION INTRAMUSCULAR; INTRAVENOUS; SUBCUTANEOUS at 20:09

## 2024-03-16 RX ADMIN — POTASSIUM CHLORIDE, DEXTROSE MONOHYDRATE AND SODIUM CHLORIDE: 150; 5; 450 INJECTION, SOLUTION INTRAVENOUS at 12:50

## 2024-03-16 RX ADMIN — POTASSIUM CHLORIDE, DEXTROSE MONOHYDRATE AND SODIUM CHLORIDE: 150; 5; 450 INJECTION, SOLUTION INTRAVENOUS at 23:46

## 2024-03-16 RX ADMIN — ENOXAPARIN SODIUM 40 MG: 100 INJECTION SUBCUTANEOUS at 08:52

## 2024-03-16 RX ADMIN — CARVEDILOL 12.5 MG: 12.5 TABLET, FILM COATED ORAL at 08:52

## 2024-03-16 RX ADMIN — ACETAMINOPHEN 650 MG: 325 TABLET ORAL at 17:08

## 2024-03-16 RX ADMIN — HYDROMORPHONE HYDROCHLORIDE 0.5 MG: 1 INJECTION, SOLUTION INTRAMUSCULAR; INTRAVENOUS; SUBCUTANEOUS at 16:54

## 2024-03-16 RX ADMIN — CALCIUM CARBONATE (ANTACID) CHEW TAB 500 MG 1000 MG: 500 CHEW TAB at 18:27

## 2024-03-16 RX ADMIN — SODIUM CHLORIDE, PRESERVATIVE FREE 10 ML: 5 INJECTION INTRAVENOUS at 20:10

## 2024-03-16 RX ADMIN — HYDROMORPHONE HYDROCHLORIDE 0.5 MG: 1 INJECTION, SOLUTION INTRAMUSCULAR; INTRAVENOUS; SUBCUTANEOUS at 05:11

## 2024-03-16 ASSESSMENT — PAIN DESCRIPTION - LOCATION
LOCATION: ABDOMEN
LOCATION: CHEST

## 2024-03-16 ASSESSMENT — PAIN DESCRIPTION - DESCRIPTORS
DESCRIPTORS: ACHING

## 2024-03-16 ASSESSMENT — PAIN - FUNCTIONAL ASSESSMENT
PAIN_FUNCTIONAL_ASSESSMENT: ACTIVITIES ARE NOT PREVENTED

## 2024-03-16 ASSESSMENT — PAIN SCALES - GENERAL
PAINLEVEL_OUTOF10: 0
PAINLEVEL_OUTOF10: 4
PAINLEVEL_OUTOF10: 5
PAINLEVEL_OUTOF10: 7
PAINLEVEL_OUTOF10: 6

## 2024-03-16 ASSESSMENT — PAIN DESCRIPTION - ORIENTATION
ORIENTATION: RIGHT;MID
ORIENTATION: POSTERIOR
ORIENTATION: MID;LOWER
ORIENTATION: MID;LOWER
ORIENTATION: POSTERIOR

## 2024-03-17 LAB
ANION GAP SERPL CALC-SCNC: 5 MMOL/L (ref 5–15)
BASOPHILS # BLD: 0 K/UL (ref 0–0.1)
BASOPHILS NFR BLD: 1 % (ref 0–1)
BUN SERPL-MCNC: 11 MG/DL (ref 6–20)
BUN/CREAT SERPL: 20 (ref 12–20)
CALCIUM SERPL-MCNC: 8.5 MG/DL (ref 8.5–10.1)
CHLORIDE SERPL-SCNC: 108 MMOL/L (ref 97–108)
CO2 SERPL-SCNC: 23 MMOL/L (ref 21–32)
CREAT SERPL-MCNC: 0.55 MG/DL (ref 0.7–1.3)
DIFFERENTIAL METHOD BLD: ABNORMAL
EOSINOPHIL # BLD: 0.3 K/UL (ref 0–0.4)
EOSINOPHIL NFR BLD: 5 % (ref 0–7)
ERYTHROCYTE [DISTWIDTH] IN BLOOD BY AUTOMATED COUNT: 12.9 % (ref 11.5–14.5)
GLUCOSE SERPL-MCNC: 110 MG/DL (ref 65–100)
HCT VFR BLD AUTO: 32.3 % (ref 36.6–50.3)
HGB BLD-MCNC: 10.4 G/DL (ref 12.1–17)
IMM GRANULOCYTES # BLD AUTO: 0 K/UL (ref 0–0.04)
IMM GRANULOCYTES NFR BLD AUTO: 0 % (ref 0–0.5)
LYMPHOCYTES # BLD: 1.4 K/UL (ref 0.8–3.5)
LYMPHOCYTES NFR BLD: 25 % (ref 12–49)
MCH RBC QN AUTO: 30.1 PG (ref 26–34)
MCHC RBC AUTO-ENTMCNC: 32.2 G/DL (ref 30–36.5)
MCV RBC AUTO: 93.6 FL (ref 80–99)
MONOCYTES # BLD: 0.9 K/UL (ref 0–1)
MONOCYTES NFR BLD: 16 % (ref 5–13)
NEUTS SEG # BLD: 3 K/UL (ref 1.8–8)
NEUTS SEG NFR BLD: 54 % (ref 32–75)
NRBC # BLD: 0 K/UL (ref 0–0.01)
NRBC BLD-RTO: 0 PER 100 WBC
PLATELET # BLD AUTO: 109 K/UL (ref 150–400)
PMV BLD AUTO: 10.7 FL (ref 8.9–12.9)
POTASSIUM SERPL-SCNC: 3.9 MMOL/L (ref 3.5–5.1)
RBC # BLD AUTO: 3.45 M/UL (ref 4.1–5.7)
SODIUM SERPL-SCNC: 136 MMOL/L (ref 136–145)
WBC # BLD AUTO: 5.6 K/UL (ref 4.1–11.1)

## 2024-03-17 PROCEDURE — 51702 INSERT TEMP BLADDER CATH: CPT

## 2024-03-17 PROCEDURE — 2580000003 HC RX 258: Performed by: SURGERY

## 2024-03-17 PROCEDURE — 2500000003 HC RX 250 WO HCPCS: Performed by: SURGERY

## 2024-03-17 PROCEDURE — 96361 HYDRATE IV INFUSION ADD-ON: CPT

## 2024-03-17 PROCEDURE — 6360000002 HC RX W HCPCS: Performed by: SURGERY

## 2024-03-17 PROCEDURE — 80048 BASIC METABOLIC PNL TOTAL CA: CPT

## 2024-03-17 PROCEDURE — 96376 TX/PRO/DX INJ SAME DRUG ADON: CPT

## 2024-03-17 PROCEDURE — 6370000000 HC RX 637 (ALT 250 FOR IP): Performed by: SURGERY

## 2024-03-17 PROCEDURE — 36415 COLL VENOUS BLD VENIPUNCTURE: CPT

## 2024-03-17 PROCEDURE — G0378 HOSPITAL OBSERVATION PER HR: HCPCS

## 2024-03-17 PROCEDURE — 85025 COMPLETE CBC W/AUTO DIFF WBC: CPT

## 2024-03-17 PROCEDURE — 96372 THER/PROPH/DIAG INJ SC/IM: CPT

## 2024-03-17 RX ADMIN — AMLODIPINE BESYLATE 10 MG: 5 TABLET ORAL at 09:15

## 2024-03-17 RX ADMIN — ENOXAPARIN SODIUM 40 MG: 100 INJECTION SUBCUTANEOUS at 09:15

## 2024-03-17 RX ADMIN — HYDROMORPHONE HYDROCHLORIDE 0.25 MG: 1 INJECTION, SOLUTION INTRAMUSCULAR; INTRAVENOUS; SUBCUTANEOUS at 12:31

## 2024-03-17 RX ADMIN — DOXAZOSIN 4 MG: 2 TABLET ORAL at 09:15

## 2024-03-17 RX ADMIN — HYDROMORPHONE HYDROCHLORIDE 0.25 MG: 1 INJECTION, SOLUTION INTRAMUSCULAR; INTRAVENOUS; SUBCUTANEOUS at 00:29

## 2024-03-17 RX ADMIN — HYDROMORPHONE HYDROCHLORIDE 0.5 MG: 1 INJECTION, SOLUTION INTRAMUSCULAR; INTRAVENOUS; SUBCUTANEOUS at 15:33

## 2024-03-17 RX ADMIN — HYDROMORPHONE HYDROCHLORIDE 0.5 MG: 1 INJECTION, SOLUTION INTRAMUSCULAR; INTRAVENOUS; SUBCUTANEOUS at 18:50

## 2024-03-17 RX ADMIN — SODIUM CHLORIDE, PRESERVATIVE FREE 10 ML: 5 INJECTION INTRAVENOUS at 09:27

## 2024-03-17 RX ADMIN — HYDROMORPHONE HYDROCHLORIDE 0.25 MG: 1 INJECTION, SOLUTION INTRAMUSCULAR; INTRAVENOUS; SUBCUTANEOUS at 05:29

## 2024-03-17 RX ADMIN — SODIUM CHLORIDE, PRESERVATIVE FREE 10 ML: 5 INJECTION INTRAVENOUS at 21:20

## 2024-03-17 RX ADMIN — POTASSIUM CHLORIDE, DEXTROSE MONOHYDRATE AND SODIUM CHLORIDE: 150; 5; 450 INJECTION, SOLUTION INTRAVENOUS at 20:22

## 2024-03-17 RX ADMIN — HYDROMORPHONE HYDROCHLORIDE 0.5 MG: 1 INJECTION, SOLUTION INTRAMUSCULAR; INTRAVENOUS; SUBCUTANEOUS at 09:17

## 2024-03-17 RX ADMIN — HYDROMORPHONE HYDROCHLORIDE 0.5 MG: 1 INJECTION, SOLUTION INTRAMUSCULAR; INTRAVENOUS; SUBCUTANEOUS at 22:07

## 2024-03-17 RX ADMIN — CARVEDILOL 12.5 MG: 12.5 TABLET, FILM COATED ORAL at 17:59

## 2024-03-17 RX ADMIN — CARVEDILOL 12.5 MG: 12.5 TABLET, FILM COATED ORAL at 09:15

## 2024-03-17 ASSESSMENT — PAIN DESCRIPTION - LOCATION
LOCATION: ABDOMEN

## 2024-03-17 ASSESSMENT — PAIN SCALES - GENERAL
PAINLEVEL_OUTOF10: 6
PAINLEVEL_OUTOF10: 0
PAINLEVEL_OUTOF10: 7
PAINLEVEL_OUTOF10: 6
PAINLEVEL_OUTOF10: 0
PAINLEVEL_OUTOF10: 3
PAINLEVEL_OUTOF10: 0
PAINLEVEL_OUTOF10: 3
PAINLEVEL_OUTOF10: 3
PAINLEVEL_OUTOF10: 0

## 2024-03-17 ASSESSMENT — PAIN DESCRIPTION - ORIENTATION
ORIENTATION: MID
ORIENTATION: ANTERIOR
ORIENTATION: LOWER
ORIENTATION: MID

## 2024-03-17 ASSESSMENT — PAIN DESCRIPTION - DESCRIPTORS
DESCRIPTORS: ACHING

## 2024-03-17 ASSESSMENT — PAIN - FUNCTIONAL ASSESSMENT: PAIN_FUNCTIONAL_ASSESSMENT: PREVENTS OR INTERFERES SOME ACTIVE ACTIVITIES AND ADLS

## 2024-03-18 LAB
ANION GAP SERPL CALC-SCNC: 4 MMOL/L (ref 5–15)
BASOPHILS # BLD: 0 K/UL (ref 0–0.1)
BASOPHILS NFR BLD: 1 % (ref 0–1)
BUN SERPL-MCNC: 10 MG/DL (ref 6–20)
BUN/CREAT SERPL: 17 (ref 12–20)
CALCIUM SERPL-MCNC: 8.2 MG/DL (ref 8.5–10.1)
CHLORIDE SERPL-SCNC: 107 MMOL/L (ref 97–108)
CO2 SERPL-SCNC: 23 MMOL/L (ref 21–32)
CREAT SERPL-MCNC: 0.6 MG/DL (ref 0.7–1.3)
DIFFERENTIAL METHOD BLD: ABNORMAL
EOSINOPHIL # BLD: 0.2 K/UL (ref 0–0.4)
EOSINOPHIL NFR BLD: 4 % (ref 0–7)
ERYTHROCYTE [DISTWIDTH] IN BLOOD BY AUTOMATED COUNT: 12.7 % (ref 11.5–14.5)
GLUCOSE SERPL-MCNC: 123 MG/DL (ref 65–100)
HCT VFR BLD AUTO: 33.8 % (ref 36.6–50.3)
HGB BLD-MCNC: 11.2 G/DL (ref 12.1–17)
IMM GRANULOCYTES # BLD AUTO: 0 K/UL (ref 0–0.04)
IMM GRANULOCYTES NFR BLD AUTO: 1 % (ref 0–0.5)
LYMPHOCYTES # BLD: 0.9 K/UL (ref 0.8–3.5)
LYMPHOCYTES NFR BLD: 22 % (ref 12–49)
MCH RBC QN AUTO: 30.9 PG (ref 26–34)
MCHC RBC AUTO-ENTMCNC: 33.1 G/DL (ref 30–36.5)
MCV RBC AUTO: 93.4 FL (ref 80–99)
MONOCYTES # BLD: 0.8 K/UL (ref 0–1)
MONOCYTES NFR BLD: 18 % (ref 5–13)
NEUTS SEG # BLD: 2.3 K/UL (ref 1.8–8)
NEUTS SEG NFR BLD: 55 % (ref 32–75)
NRBC # BLD: 0.02 K/UL (ref 0–0.01)
NRBC BLD-RTO: 0.5 PER 100 WBC
PLATELET # BLD AUTO: 128 K/UL (ref 150–400)
PMV BLD AUTO: 10.6 FL (ref 8.9–12.9)
POTASSIUM SERPL-SCNC: 3.9 MMOL/L (ref 3.5–5.1)
RBC # BLD AUTO: 3.62 M/UL (ref 4.1–5.7)
SODIUM SERPL-SCNC: 134 MMOL/L (ref 136–145)
WBC # BLD AUTO: 4.1 K/UL (ref 4.1–11.1)

## 2024-03-18 PROCEDURE — A4216 STERILE WATER/SALINE, 10 ML: HCPCS | Performed by: SURGERY

## 2024-03-18 PROCEDURE — 2500000003 HC RX 250 WO HCPCS: Performed by: SURGERY

## 2024-03-18 PROCEDURE — 36415 COLL VENOUS BLD VENIPUNCTURE: CPT

## 2024-03-18 PROCEDURE — 6370000000 HC RX 637 (ALT 250 FOR IP): Performed by: SURGERY

## 2024-03-18 PROCEDURE — 6360000002 HC RX W HCPCS: Performed by: SURGERY

## 2024-03-18 PROCEDURE — 1100000000 HC RM PRIVATE

## 2024-03-18 PROCEDURE — 80048 BASIC METABOLIC PNL TOTAL CA: CPT

## 2024-03-18 PROCEDURE — C9113 INJ PANTOPRAZOLE SODIUM, VIA: HCPCS | Performed by: SURGERY

## 2024-03-18 PROCEDURE — G0378 HOSPITAL OBSERVATION PER HR: HCPCS

## 2024-03-18 PROCEDURE — 96376 TX/PRO/DX INJ SAME DRUG ADON: CPT

## 2024-03-18 PROCEDURE — 96361 HYDRATE IV INFUSION ADD-ON: CPT

## 2024-03-18 PROCEDURE — 99024 POSTOP FOLLOW-UP VISIT: CPT | Performed by: NURSE PRACTITIONER

## 2024-03-18 PROCEDURE — 96372 THER/PROPH/DIAG INJ SC/IM: CPT

## 2024-03-18 PROCEDURE — 2580000003 HC RX 258: Performed by: SURGERY

## 2024-03-18 PROCEDURE — 85025 COMPLETE CBC W/AUTO DIFF WBC: CPT

## 2024-03-18 RX ORDER — BETHANECHOL CHLORIDE 10 MG/1
10 TABLET ORAL ONCE
Status: COMPLETED | OUTPATIENT
Start: 2024-03-18 | End: 2024-03-18

## 2024-03-18 RX ADMIN — POTASSIUM CHLORIDE, DEXTROSE MONOHYDRATE AND SODIUM CHLORIDE: 150; 5; 450 INJECTION, SOLUTION INTRAVENOUS at 07:22

## 2024-03-18 RX ADMIN — BETHANECHOL CHLORIDE 10 MG: 10 TABLET ORAL at 20:33

## 2024-03-18 RX ADMIN — HYDROMORPHONE HYDROCHLORIDE 0.5 MG: 1 INJECTION, SOLUTION INTRAMUSCULAR; INTRAVENOUS; SUBCUTANEOUS at 12:07

## 2024-03-18 RX ADMIN — ONDANSETRON 4 MG: 2 INJECTION INTRAMUSCULAR; INTRAVENOUS at 18:11

## 2024-03-18 RX ADMIN — ENOXAPARIN SODIUM 40 MG: 100 INJECTION SUBCUTANEOUS at 08:36

## 2024-03-18 RX ADMIN — HYDROMORPHONE HYDROCHLORIDE 0.5 MG: 1 INJECTION, SOLUTION INTRAMUSCULAR; INTRAVENOUS; SUBCUTANEOUS at 03:34

## 2024-03-18 RX ADMIN — POTASSIUM CHLORIDE, DEXTROSE MONOHYDRATE AND SODIUM CHLORIDE: 150; 5; 450 INJECTION, SOLUTION INTRAVENOUS at 17:13

## 2024-03-18 RX ADMIN — HYDROMORPHONE HYDROCHLORIDE 0.5 MG: 1 INJECTION, SOLUTION INTRAMUSCULAR; INTRAVENOUS; SUBCUTANEOUS at 18:12

## 2024-03-18 RX ADMIN — ONDANSETRON 4 MG: 2 INJECTION INTRAMUSCULAR; INTRAVENOUS at 12:14

## 2024-03-18 RX ADMIN — CARVEDILOL 12.5 MG: 12.5 TABLET, FILM COATED ORAL at 08:36

## 2024-03-18 RX ADMIN — HYDROMORPHONE HYDROCHLORIDE 0.5 MG: 1 INJECTION, SOLUTION INTRAMUSCULAR; INTRAVENOUS; SUBCUTANEOUS at 08:36

## 2024-03-18 RX ADMIN — CARVEDILOL 12.5 MG: 12.5 TABLET, FILM COATED ORAL at 18:11

## 2024-03-18 RX ADMIN — DOXAZOSIN 4 MG: 2 TABLET ORAL at 08:36

## 2024-03-18 RX ADMIN — SODIUM CHLORIDE, PRESERVATIVE FREE 10 ML: 5 INJECTION INTRAVENOUS at 20:33

## 2024-03-18 RX ADMIN — AMLODIPINE BESYLATE 10 MG: 5 TABLET ORAL at 08:36

## 2024-03-18 RX ADMIN — SODIUM CHLORIDE, PRESERVATIVE FREE 10 ML: 5 INJECTION INTRAVENOUS at 08:38

## 2024-03-18 RX ADMIN — SODIUM CHLORIDE, PRESERVATIVE FREE 40 MG: 5 INJECTION INTRAVENOUS at 18:11

## 2024-03-18 ASSESSMENT — PAIN DESCRIPTION - ORIENTATION
ORIENTATION: MID;LOWER
ORIENTATION: ANTERIOR;RIGHT
ORIENTATION: LOWER
ORIENTATION: MID;LOWER

## 2024-03-18 ASSESSMENT — PAIN DESCRIPTION - LOCATION
LOCATION: ABDOMEN

## 2024-03-18 ASSESSMENT — PAIN SCALES - GENERAL
PAINLEVEL_OUTOF10: 7
PAINLEVEL_OUTOF10: 7
PAINLEVEL_OUTOF10: 5
PAINLEVEL_OUTOF10: 7

## 2024-03-18 ASSESSMENT — PAIN - FUNCTIONAL ASSESSMENT
PAIN_FUNCTIONAL_ASSESSMENT: PREVENTS OR INTERFERES SOME ACTIVE ACTIVITIES AND ADLS

## 2024-03-18 ASSESSMENT — PAIN DESCRIPTION - DESCRIPTORS
DESCRIPTORS: SORE;CRAMPING
DESCRIPTORS: SORE;ACHING
DESCRIPTORS: CRAMPING;ACHING

## 2024-03-19 ENCOUNTER — APPOINTMENT (OUTPATIENT)
Facility: HOSPITAL | Age: 69
DRG: 394 | End: 2024-03-19
Payer: MEDICARE

## 2024-03-19 PROCEDURE — 6360000002 HC RX W HCPCS: Performed by: SURGERY

## 2024-03-19 PROCEDURE — 74176 CT ABD & PELVIS W/O CONTRAST: CPT

## 2024-03-19 PROCEDURE — 6360000002 HC RX W HCPCS: Performed by: NURSE PRACTITIONER

## 2024-03-19 PROCEDURE — 2500000003 HC RX 250 WO HCPCS: Performed by: SURGERY

## 2024-03-19 PROCEDURE — A4216 STERILE WATER/SALINE, 10 ML: HCPCS | Performed by: SURGERY

## 2024-03-19 PROCEDURE — 6370000000 HC RX 637 (ALT 250 FOR IP): Performed by: SURGERY

## 2024-03-19 PROCEDURE — 1100000000 HC RM PRIVATE

## 2024-03-19 PROCEDURE — 6370000000 HC RX 637 (ALT 250 FOR IP): Performed by: STUDENT IN AN ORGANIZED HEALTH CARE EDUCATION/TRAINING PROGRAM

## 2024-03-19 PROCEDURE — C9113 INJ PANTOPRAZOLE SODIUM, VIA: HCPCS | Performed by: SURGERY

## 2024-03-19 PROCEDURE — 2580000003 HC RX 258: Performed by: SURGERY

## 2024-03-19 PROCEDURE — 6360000004 HC RX CONTRAST MEDICATION: Performed by: SURGERY

## 2024-03-19 RX ORDER — PROCHLORPERAZINE EDISYLATE 5 MG/ML
10 INJECTION INTRAMUSCULAR; INTRAVENOUS EVERY 6 HOURS PRN
Status: DISCONTINUED | OUTPATIENT
Start: 2024-03-19 | End: 2024-03-24 | Stop reason: HOSPADM

## 2024-03-19 RX ORDER — KETOROLAC TROMETHAMINE 30 MG/ML
30 INJECTION, SOLUTION INTRAMUSCULAR; INTRAVENOUS EVERY 6 HOURS
Status: DISCONTINUED | OUTPATIENT
Start: 2024-03-19 | End: 2024-03-24 | Stop reason: HOSPADM

## 2024-03-19 RX ADMIN — HYDROMORPHONE HYDROCHLORIDE 0.5 MG: 1 INJECTION, SOLUTION INTRAMUSCULAR; INTRAVENOUS; SUBCUTANEOUS at 05:53

## 2024-03-19 RX ADMIN — SODIUM CHLORIDE, PRESERVATIVE FREE 10 ML: 5 INJECTION INTRAVENOUS at 20:22

## 2024-03-19 RX ADMIN — SODIUM CHLORIDE, PRESERVATIVE FREE 40 MG: 5 INJECTION INTRAVENOUS at 09:26

## 2024-03-19 RX ADMIN — ONDANSETRON 4 MG: 2 INJECTION INTRAMUSCULAR; INTRAVENOUS at 12:01

## 2024-03-19 RX ADMIN — DIATRIZOATE MEGLUMINE AND DIATRIZOATE SODIUM 30 ML: 660; 100 LIQUID ORAL; RECTAL at 09:14

## 2024-03-19 RX ADMIN — CARVEDILOL 12.5 MG: 12.5 TABLET, FILM COATED ORAL at 09:18

## 2024-03-19 RX ADMIN — ONDANSETRON 4 MG: 2 INJECTION INTRAMUSCULAR; INTRAVENOUS at 00:25

## 2024-03-19 RX ADMIN — HYDROMORPHONE HYDROCHLORIDE 0.5 MG: 1 INJECTION, SOLUTION INTRAMUSCULAR; INTRAVENOUS; SUBCUTANEOUS at 00:28

## 2024-03-19 RX ADMIN — KETOROLAC TROMETHAMINE 30 MG: 30 INJECTION, SOLUTION INTRAMUSCULAR at 15:01

## 2024-03-19 RX ADMIN — CARVEDILOL 12.5 MG: 12.5 TABLET, FILM COATED ORAL at 17:51

## 2024-03-19 RX ADMIN — PROCHLORPERAZINE EDISYLATE 10 MG: 5 INJECTION INTRAMUSCULAR; INTRAVENOUS at 14:47

## 2024-03-19 RX ADMIN — KETOROLAC TROMETHAMINE 30 MG: 30 INJECTION, SOLUTION INTRAMUSCULAR at 20:21

## 2024-03-19 RX ADMIN — DOXAZOSIN 4 MG: 2 TABLET ORAL at 09:17

## 2024-03-19 RX ADMIN — CALCIUM CARBONATE (ANTACID) CHEW TAB 500 MG 1000 MG: 500 CHEW TAB at 03:43

## 2024-03-19 RX ADMIN — POTASSIUM CHLORIDE, DEXTROSE MONOHYDRATE AND SODIUM CHLORIDE: 150; 5; 450 INJECTION, SOLUTION INTRAVENOUS at 03:45

## 2024-03-19 RX ADMIN — AMLODIPINE BESYLATE 10 MG: 5 TABLET ORAL at 09:17

## 2024-03-19 RX ADMIN — ENOXAPARIN SODIUM 40 MG: 100 INJECTION SUBCUTANEOUS at 09:24

## 2024-03-19 RX ADMIN — ONDANSETRON 4 MG: 2 INJECTION INTRAMUSCULAR; INTRAVENOUS at 05:53

## 2024-03-19 RX ADMIN — POTASSIUM CHLORIDE, DEXTROSE MONOHYDRATE AND SODIUM CHLORIDE: 150; 5; 450 INJECTION, SOLUTION INTRAVENOUS at 14:52

## 2024-03-19 RX ADMIN — PROCHLORPERAZINE EDISYLATE 10 MG: 5 INJECTION INTRAMUSCULAR; INTRAVENOUS at 20:21

## 2024-03-19 RX ADMIN — SODIUM CHLORIDE, PRESERVATIVE FREE 10 ML: 5 INJECTION INTRAVENOUS at 09:26

## 2024-03-19 ASSESSMENT — PAIN DESCRIPTION - ORIENTATION
ORIENTATION: ANTERIOR;INNER
ORIENTATION: INNER;LOWER

## 2024-03-19 ASSESSMENT — PAIN SCALES - GENERAL
PAINLEVEL_OUTOF10: 3
PAINLEVEL_OUTOF10: 6
PAINLEVEL_OUTOF10: 10

## 2024-03-19 ASSESSMENT — PAIN - FUNCTIONAL ASSESSMENT
PAIN_FUNCTIONAL_ASSESSMENT: PREVENTS OR INTERFERES WITH ALL ACTIVE AND SOME PASSIVE ACTIVITIES
PAIN_FUNCTIONAL_ASSESSMENT: ACTIVITIES ARE NOT PREVENTED

## 2024-03-19 ASSESSMENT — PAIN DESCRIPTION - DESCRIPTORS
DESCRIPTORS: STABBING;TEARING;SHARP
DESCRIPTORS: PRESSURE;DISCOMFORT

## 2024-03-19 ASSESSMENT — PAIN DESCRIPTION - LOCATION
LOCATION: ABDOMEN

## 2024-03-20 LAB
ANION GAP SERPL CALC-SCNC: 5 MMOL/L (ref 5–15)
BASOPHILS # BLD: 0 K/UL (ref 0–0.1)
BASOPHILS NFR BLD: 0 % (ref 0–1)
BUN SERPL-MCNC: 11 MG/DL (ref 6–20)
BUN/CREAT SERPL: 13 (ref 12–20)
CALCIUM SERPL-MCNC: 8.2 MG/DL (ref 8.5–10.1)
CHLORIDE SERPL-SCNC: 108 MMOL/L (ref 97–108)
CO2 SERPL-SCNC: 21 MMOL/L (ref 21–32)
CREAT SERPL-MCNC: 0.86 MG/DL (ref 0.7–1.3)
DIFFERENTIAL METHOD BLD: ABNORMAL
EOSINOPHIL # BLD: 0.1 K/UL (ref 0–0.4)
EOSINOPHIL NFR BLD: 3 % (ref 0–7)
ERYTHROCYTE [DISTWIDTH] IN BLOOD BY AUTOMATED COUNT: 12.9 % (ref 11.5–14.5)
GLUCOSE BLD STRIP.AUTO-MCNC: 127 MG/DL (ref 65–117)
GLUCOSE SERPL-MCNC: 103 MG/DL (ref 65–100)
HCT VFR BLD AUTO: 31.2 % (ref 36.6–50.3)
HGB BLD-MCNC: 10.4 G/DL (ref 12.1–17)
IMM GRANULOCYTES # BLD AUTO: 0 K/UL (ref 0–0.04)
IMM GRANULOCYTES NFR BLD AUTO: 0 % (ref 0–0.5)
LYMPHOCYTES # BLD: 0.9 K/UL (ref 0.8–3.5)
LYMPHOCYTES NFR BLD: 20 % (ref 12–49)
MCH RBC QN AUTO: 31 PG (ref 26–34)
MCHC RBC AUTO-ENTMCNC: 33.3 G/DL (ref 30–36.5)
MCV RBC AUTO: 93.1 FL (ref 80–99)
MONOCYTES # BLD: 0.8 K/UL (ref 0–1)
MONOCYTES NFR BLD: 18 % (ref 5–13)
NEUTS SEG # BLD: 2.7 K/UL (ref 1.8–8)
NEUTS SEG NFR BLD: 59 % (ref 32–75)
NRBC # BLD: 0 K/UL (ref 0–0.01)
NRBC BLD-RTO: 0 PER 100 WBC
PLATELET # BLD AUTO: 150 K/UL (ref 150–400)
PMV BLD AUTO: 11 FL (ref 8.9–12.9)
POTASSIUM SERPL-SCNC: 3.8 MMOL/L (ref 3.5–5.1)
RBC # BLD AUTO: 3.35 M/UL (ref 4.1–5.7)
SERVICE CMNT-IMP: ABNORMAL
SODIUM SERPL-SCNC: 134 MMOL/L (ref 136–145)
WBC # BLD AUTO: 4.5 K/UL (ref 4.1–11.1)

## 2024-03-20 PROCEDURE — 1100000000 HC RM PRIVATE

## 2024-03-20 PROCEDURE — C1751 CATH, INF, PER/CENT/MIDLINE: HCPCS

## 2024-03-20 PROCEDURE — 6360000002 HC RX W HCPCS: Performed by: SURGERY

## 2024-03-20 PROCEDURE — 85025 COMPLETE CBC W/AUTO DIFF WBC: CPT

## 2024-03-20 PROCEDURE — C9113 INJ PANTOPRAZOLE SODIUM, VIA: HCPCS | Performed by: SURGERY

## 2024-03-20 PROCEDURE — A4216 STERILE WATER/SALINE, 10 ML: HCPCS | Performed by: SURGERY

## 2024-03-20 PROCEDURE — 36415 COLL VENOUS BLD VENIPUNCTURE: CPT

## 2024-03-20 PROCEDURE — 80048 BASIC METABOLIC PNL TOTAL CA: CPT

## 2024-03-20 PROCEDURE — 76937 US GUIDE VASCULAR ACCESS: CPT

## 2024-03-20 PROCEDURE — 36569 INSJ PICC 5 YR+ W/O IMAGING: CPT

## 2024-03-20 PROCEDURE — 82962 GLUCOSE BLOOD TEST: CPT

## 2024-03-20 PROCEDURE — C1892 INTRO/SHEATH,FIXED,PEEL-AWAY: HCPCS

## 2024-03-20 PROCEDURE — 2580000003 HC RX 258: Performed by: SURGERY

## 2024-03-20 PROCEDURE — 6370000000 HC RX 637 (ALT 250 FOR IP): Performed by: SURGERY

## 2024-03-20 PROCEDURE — 2500000003 HC RX 250 WO HCPCS: Performed by: SURGERY

## 2024-03-20 PROCEDURE — 6360000002 HC RX W HCPCS: Performed by: NURSE PRACTITIONER

## 2024-03-20 PROCEDURE — 02HV33Z INSERTION OF INFUSION DEVICE INTO SUPERIOR VENA CAVA, PERCUTANEOUS APPROACH: ICD-10-PCS | Performed by: SURGERY

## 2024-03-20 PROCEDURE — 3E0436Z INTRODUCTION OF NUTRITIONAL SUBSTANCE INTO CENTRAL VEIN, PERCUTANEOUS APPROACH: ICD-10-PCS | Performed by: SURGERY

## 2024-03-20 RX ORDER — SIMETHICONE 80 MG
80 TABLET,CHEWABLE ORAL EVERY 6 HOURS PRN
Status: DISCONTINUED | OUTPATIENT
Start: 2024-03-20 | End: 2024-03-24 | Stop reason: HOSPADM

## 2024-03-20 RX ORDER — FAMOTIDINE 20 MG/1
20 TABLET, FILM COATED ORAL 2 TIMES DAILY
Status: DISCONTINUED | OUTPATIENT
Start: 2024-03-20 | End: 2024-03-24 | Stop reason: HOSPADM

## 2024-03-20 RX ADMIN — ENOXAPARIN SODIUM 40 MG: 100 INJECTION SUBCUTANEOUS at 09:23

## 2024-03-20 RX ADMIN — POTASSIUM CHLORIDE, DEXTROSE MONOHYDRATE AND SODIUM CHLORIDE: 150; 5; 450 INJECTION, SOLUTION INTRAVENOUS at 18:17

## 2024-03-20 RX ADMIN — PROCHLORPERAZINE EDISYLATE 10 MG: 5 INJECTION INTRAMUSCULAR; INTRAVENOUS at 02:51

## 2024-03-20 RX ADMIN — DOXAZOSIN 4 MG: 2 TABLET ORAL at 09:22

## 2024-03-20 RX ADMIN — FAMOTIDINE 20 MG: 20 TABLET ORAL at 13:21

## 2024-03-20 RX ADMIN — PROCHLORPERAZINE EDISYLATE 10 MG: 5 INJECTION INTRAMUSCULAR; INTRAVENOUS at 13:35

## 2024-03-20 RX ADMIN — PROCHLORPERAZINE EDISYLATE 10 MG: 5 INJECTION INTRAMUSCULAR; INTRAVENOUS at 19:33

## 2024-03-20 RX ADMIN — KETOROLAC TROMETHAMINE 30 MG: 30 INJECTION, SOLUTION INTRAMUSCULAR at 02:51

## 2024-03-20 RX ADMIN — SODIUM CHLORIDE, PRESERVATIVE FREE 10 ML: 5 INJECTION INTRAVENOUS at 22:05

## 2024-03-20 RX ADMIN — KETOROLAC TROMETHAMINE 30 MG: 30 INJECTION, SOLUTION INTRAMUSCULAR at 13:36

## 2024-03-20 RX ADMIN — SODIUM CHLORIDE, PRESERVATIVE FREE 40 MG: 5 INJECTION INTRAVENOUS at 13:36

## 2024-03-20 RX ADMIN — AMLODIPINE BESYLATE 10 MG: 5 TABLET ORAL at 09:23

## 2024-03-20 RX ADMIN — SODIUM CHLORIDE, PRESERVATIVE FREE 10 ML: 5 INJECTION INTRAVENOUS at 13:37

## 2024-03-20 RX ADMIN — MAGNESIUM SULFATE HEPTAHYDRATE: 500 INJECTION, SOLUTION INTRAMUSCULAR; INTRAVENOUS at 18:09

## 2024-03-20 RX ADMIN — CARVEDILOL 12.5 MG: 12.5 TABLET, FILM COATED ORAL at 09:23

## 2024-03-20 RX ADMIN — KETOROLAC TROMETHAMINE 30 MG: 30 INJECTION, SOLUTION INTRAMUSCULAR at 19:33

## 2024-03-20 RX ADMIN — SIMETHICONE 80 MG: 80 TABLET, CHEWABLE ORAL at 13:21

## 2024-03-20 RX ADMIN — POTASSIUM CHLORIDE, DEXTROSE MONOHYDRATE AND SODIUM CHLORIDE: 150; 5; 450 INJECTION, SOLUTION INTRAVENOUS at 01:34

## 2024-03-20 ASSESSMENT — PAIN SCALES - GENERAL
PAINLEVEL_OUTOF10: 1
PAINLEVEL_OUTOF10: 0

## 2024-03-20 ASSESSMENT — PAIN DESCRIPTION - LOCATION: LOCATION: ABDOMEN

## 2024-03-20 NOTE — PLAN OF CARE
Problem: Discharge Planning  Goal: Discharge to home or other facility with appropriate resources  Outcome: Progressing  Flowsheets (Taken 3/18/2024 2100 by Amira Arellano, RN)  Discharge to home or other facility with appropriate resources:   Identify barriers to discharge with patient and caregiver   Arrange for needed discharge resources and transportation as appropriate   Identify discharge learning needs (meds, wound care, etc)     Problem: Pain  Goal: Verbalizes/displays adequate comfort level or baseline comfort level  3/19/2024 2150 by Tara Pineda, RN  Outcome: Progressing  Flowsheets (Taken 3/17/2024 0814 by Brandee Preciado LPN)  Verbalizes/displays adequate comfort level or baseline comfort level:   Encourage patient to monitor pain and request assistance   Assess pain using appropriate pain scale   Administer analgesics based on type and severity of pain and evaluate response  3/19/2024 1306 by Demetria Platt RN  Outcome: Progressing     Problem: Safety - Adult  Goal: Free from fall injury  3/19/2024 2150 by Tara Pineda, RN  Outcome: Progressing  Flowsheets (Taken 3/19/2024 2150)  Free From Fall Injury: Instruct family/caregiver on patient safety  3/19/2024 1306 by Demetria Platt RN  Outcome: Progressing      Patient informed of results patient needs a refill on Norco and nadolol please advise on norco refill

## 2024-03-20 NOTE — PROCEDURES
PICC Education: Explained reason and rationale for PICC placement along with providing education in order to make an informed consent including nature, risks, benefits, potential complications, care and maintenance of PICC line. The opportunity for questions or concerns was given. Patient  gave written consent for PICC procedure to be done at the bedside. Patient  verbalizes understanding at this time.      Procedure:  Time out completed.  Pre procedure assessment done.  Maximum sterile barrier precautions observed throughout procedure.  Lidocaine 1% 3.0 ml sc given prior to cannulation  Cannulated Basilic vein using ultrasound guidance and modified seldinger technique.  Inserted DOUBLE lumen 5 fr PICC in  Right arm using, Tapos[employer]© Tip Location System and 3CG   Patient has sinus rhythm.  Tip Positioning System indicating tall P wave and no negative deflection before P wave which would indicate the PICC tip is properly placed in the distal SVC or the CAJ.  PICC tip was confirmed by 2 PICC nurses and 3CG printout was placed on patient’s chart.  Blood return verified and flushed with 20ml NS in each port.  Sterile CHG impregnated dressing applied with Stat loc per protocol.  Curios caps applied to each port.  Reason for access (TPN).  Complications related to insertion (None).  Patient tolerated procedure well with minimal blood loss.   PICC procedure performed by: Shahrzad Gilbert RN, BSN, [facility]/ Vascular Access Nurse  Assisted by: Barbara Martínez RN, [facility] / Vascular Access Nurse    RIGHT  arm circumference: 27 cm.   Catheter internal length:  40 cm  Catheter external length: 0 cm  TOTAL Length:40 cm  PICC catheter occupies 21% of vein    Brand of catheter:  BARD  Lot #[id number]   REF# [id number]    EXP 12/31/2024.    Primary nurse ADELSO Tadeo notified PICC line may be used and to hang new infusion tubing prior to use.        Shahrzad Gilbert RN, BSN, [facility]  Vascular Access Team

## 2024-03-21 LAB
ALBUMIN SERPL-MCNC: 2.5 G/DL (ref 3.5–5)
ALBUMIN/GLOB SERPL: 0.7 (ref 1.1–2.2)
ALP SERPL-CCNC: 88 U/L (ref 45–117)
ALT SERPL-CCNC: 55 U/L (ref 12–78)
ANION GAP SERPL CALC-SCNC: 6 MMOL/L (ref 5–15)
AST SERPL-CCNC: 33 U/L (ref 15–37)
BASOPHILS # BLD: 0 K/UL (ref 0–0.1)
BASOPHILS NFR BLD: 0 % (ref 0–1)
BILIRUB DIRECT SERPL-MCNC: 0.2 MG/DL (ref 0–0.2)
BILIRUB SERPL-MCNC: 0.5 MG/DL (ref 0.2–1)
BUN SERPL-MCNC: 13 MG/DL (ref 6–20)
BUN/CREAT SERPL: 17 (ref 12–20)
CALCIUM SERPL-MCNC: 8.2 MG/DL (ref 8.5–10.1)
CHLORIDE SERPL-SCNC: 110 MMOL/L (ref 97–108)
CO2 SERPL-SCNC: 20 MMOL/L (ref 21–32)
CREAT SERPL-MCNC: 0.75 MG/DL (ref 0.7–1.3)
DIFFERENTIAL METHOD BLD: ABNORMAL
EOSINOPHIL # BLD: 0.1 K/UL (ref 0–0.4)
EOSINOPHIL NFR BLD: 3 % (ref 0–7)
ERYTHROCYTE [DISTWIDTH] IN BLOOD BY AUTOMATED COUNT: 13.2 % (ref 11.5–14.5)
GLOBULIN SER CALC-MCNC: 3.4 G/DL (ref 2–4)
GLUCOSE BLD STRIP.AUTO-MCNC: 105 MG/DL (ref 65–117)
GLUCOSE SERPL-MCNC: 132 MG/DL (ref 65–100)
HCT VFR BLD AUTO: 29.5 % (ref 36.6–50.3)
HGB BLD-MCNC: 9.7 G/DL (ref 12.1–17)
IMM GRANULOCYTES # BLD AUTO: 0 K/UL (ref 0–0.04)
IMM GRANULOCYTES NFR BLD AUTO: 0 % (ref 0–0.5)
LYMPHOCYTES # BLD: 0.9 K/UL (ref 0.8–3.5)
LYMPHOCYTES NFR BLD: 26 % (ref 12–49)
MAGNESIUM SERPL-MCNC: 1.9 MG/DL (ref 1.6–2.4)
MCH RBC QN AUTO: 30.9 PG (ref 26–34)
MCHC RBC AUTO-ENTMCNC: 32.9 G/DL (ref 30–36.5)
MCV RBC AUTO: 93.9 FL (ref 80–99)
MONOCYTES # BLD: 0.6 K/UL (ref 0–1)
MONOCYTES NFR BLD: 19 % (ref 5–13)
NEUTS SEG # BLD: 1.7 K/UL (ref 1.8–8)
NEUTS SEG NFR BLD: 52 % (ref 32–75)
NRBC # BLD: 0 K/UL (ref 0–0.01)
NRBC BLD-RTO: 0 PER 100 WBC
PHOSPHATE SERPL-MCNC: 3 MG/DL (ref 2.6–4.7)
PLATELET # BLD AUTO: 121 K/UL (ref 150–400)
PMV BLD AUTO: 10.6 FL (ref 8.9–12.9)
POTASSIUM SERPL-SCNC: 3.7 MMOL/L (ref 3.5–5.1)
PROT SERPL-MCNC: 5.9 G/DL (ref 6.4–8.2)
RBC # BLD AUTO: 3.14 M/UL (ref 4.1–5.7)
SERVICE CMNT-IMP: NORMAL
SODIUM SERPL-SCNC: 136 MMOL/L (ref 136–145)
TRIGL SERPL-MCNC: 104 MG/DL
WBC # BLD AUTO: 3.3 K/UL (ref 4.1–11.1)

## 2024-03-21 PROCEDURE — 84478 ASSAY OF TRIGLYCERIDES: CPT

## 2024-03-21 PROCEDURE — 36415 COLL VENOUS BLD VENIPUNCTURE: CPT

## 2024-03-21 PROCEDURE — 82962 GLUCOSE BLOOD TEST: CPT

## 2024-03-21 PROCEDURE — 80076 HEPATIC FUNCTION PANEL: CPT

## 2024-03-21 PROCEDURE — 2580000003 HC RX 258: Performed by: SURGERY

## 2024-03-21 PROCEDURE — 2580000003 HC RX 258: Performed by: NURSE PRACTITIONER

## 2024-03-21 PROCEDURE — 1100000000 HC RM PRIVATE

## 2024-03-21 PROCEDURE — 83735 ASSAY OF MAGNESIUM: CPT

## 2024-03-21 PROCEDURE — 80048 BASIC METABOLIC PNL TOTAL CA: CPT

## 2024-03-21 PROCEDURE — 84100 ASSAY OF PHOSPHORUS: CPT

## 2024-03-21 PROCEDURE — 2500000003 HC RX 250 WO HCPCS: Performed by: SURGERY

## 2024-03-21 PROCEDURE — C9113 INJ PANTOPRAZOLE SODIUM, VIA: HCPCS | Performed by: SURGERY

## 2024-03-21 PROCEDURE — 85025 COMPLETE CBC W/AUTO DIFF WBC: CPT

## 2024-03-21 PROCEDURE — 6370000000 HC RX 637 (ALT 250 FOR IP): Performed by: SURGERY

## 2024-03-21 PROCEDURE — 6360000002 HC RX W HCPCS: Performed by: NURSE PRACTITIONER

## 2024-03-21 PROCEDURE — 6360000002 HC RX W HCPCS: Performed by: SURGERY

## 2024-03-21 RX ORDER — SODIUM CHLORIDE, SODIUM LACTATE, POTASSIUM CHLORIDE, CALCIUM CHLORIDE 600; 310; 30; 20 MG/100ML; MG/100ML; MG/100ML; MG/100ML
INJECTION, SOLUTION INTRAVENOUS CONTINUOUS
Status: DISCONTINUED | OUTPATIENT
Start: 2024-03-21 | End: 2024-03-24 | Stop reason: HOSPADM

## 2024-03-21 RX ADMIN — SODIUM CHLORIDE, POTASSIUM CHLORIDE, SODIUM LACTATE AND CALCIUM CHLORIDE: 600; 310; 30; 20 INJECTION, SOLUTION INTRAVENOUS at 16:45

## 2024-03-21 RX ADMIN — HYDROMORPHONE HYDROCHLORIDE 0.5 MG: 1 INJECTION, SOLUTION INTRAMUSCULAR; INTRAVENOUS; SUBCUTANEOUS at 18:19

## 2024-03-21 RX ADMIN — KETOROLAC TROMETHAMINE 30 MG: 30 INJECTION, SOLUTION INTRAMUSCULAR at 19:38

## 2024-03-21 RX ADMIN — SODIUM CHLORIDE, PRESERVATIVE FREE 40 MG: 5 INJECTION INTRAVENOUS at 08:39

## 2024-03-21 RX ADMIN — I.V. FAT EMULSION 250 ML: 20 EMULSION INTRAVENOUS at 18:19

## 2024-03-21 RX ADMIN — FAMOTIDINE 20 MG: 20 TABLET ORAL at 20:34

## 2024-03-21 RX ADMIN — ENOXAPARIN SODIUM 40 MG: 100 INJECTION SUBCUTANEOUS at 08:36

## 2024-03-21 RX ADMIN — MAGNESIUM SULFATE HEPTAHYDRATE: 500 INJECTION, SOLUTION INTRAMUSCULAR; INTRAVENOUS at 18:08

## 2024-03-21 RX ADMIN — PROCHLORPERAZINE EDISYLATE 10 MG: 5 INJECTION INTRAMUSCULAR; INTRAVENOUS at 13:27

## 2024-03-21 RX ADMIN — KETOROLAC TROMETHAMINE 30 MG: 30 INJECTION, SOLUTION INTRAMUSCULAR at 13:27

## 2024-03-21 RX ADMIN — SODIUM CHLORIDE, PRESERVATIVE FREE 10 ML: 5 INJECTION INTRAVENOUS at 08:40

## 2024-03-21 RX ADMIN — FAMOTIDINE 20 MG: 20 TABLET ORAL at 08:37

## 2024-03-21 RX ADMIN — POTASSIUM CHLORIDE, DEXTROSE MONOHYDRATE AND SODIUM CHLORIDE: 150; 5; 450 INJECTION, SOLUTION INTRAVENOUS at 04:30

## 2024-03-21 RX ADMIN — POTASSIUM CHLORIDE, DEXTROSE MONOHYDRATE AND SODIUM CHLORIDE: 150; 5; 450 INJECTION, SOLUTION INTRAVENOUS at 13:30

## 2024-03-21 RX ADMIN — KETOROLAC TROMETHAMINE 30 MG: 30 INJECTION, SOLUTION INTRAMUSCULAR at 01:53

## 2024-03-21 RX ADMIN — KETOROLAC TROMETHAMINE 30 MG: 30 INJECTION, SOLUTION INTRAMUSCULAR at 06:35

## 2024-03-21 RX ADMIN — HYDROMORPHONE HYDROCHLORIDE 0.5 MG: 1 INJECTION, SOLUTION INTRAMUSCULAR; INTRAVENOUS; SUBCUTANEOUS at 22:25

## 2024-03-21 RX ADMIN — SODIUM CHLORIDE, PRESERVATIVE FREE 10 ML: 5 INJECTION INTRAVENOUS at 20:34

## 2024-03-21 RX ADMIN — PROCHLORPERAZINE EDISYLATE 10 MG: 5 INJECTION INTRAMUSCULAR; INTRAVENOUS at 06:35

## 2024-03-21 RX ADMIN — ONDANSETRON 4 MG: 2 INJECTION INTRAMUSCULAR; INTRAVENOUS at 18:19

## 2024-03-21 RX ADMIN — PROCHLORPERAZINE EDISYLATE 10 MG: 5 INJECTION INTRAMUSCULAR; INTRAVENOUS at 01:53

## 2024-03-21 ASSESSMENT — PAIN DESCRIPTION - LOCATION
LOCATION: ABDOMEN

## 2024-03-21 ASSESSMENT — PAIN DESCRIPTION - ORIENTATION
ORIENTATION: ANTERIOR
ORIENTATION: ANTERIOR

## 2024-03-21 ASSESSMENT — PAIN SCALES - GENERAL
PAINLEVEL_OUTOF10: 3
PAINLEVEL_OUTOF10: 7
PAINLEVEL_OUTOF10: 7
PAINLEVEL_OUTOF10: 3
PAINLEVEL_OUTOF10: 2
PAINLEVEL_OUTOF10: 4
PAINLEVEL_OUTOF10: 2
PAINLEVEL_OUTOF10: 3

## 2024-03-21 ASSESSMENT — PAIN DESCRIPTION - DESCRIPTORS
DESCRIPTORS: ACHING;STABBING
DESCRIPTORS: ACHING

## 2024-03-21 ASSESSMENT — PAIN - FUNCTIONAL ASSESSMENT
PAIN_FUNCTIONAL_ASSESSMENT: ACTIVITIES ARE NOT PREVENTED
PAIN_FUNCTIONAL_ASSESSMENT: ACTIVITIES ARE NOT PREVENTED

## 2024-03-21 ASSESSMENT — PAIN DESCRIPTION - ONSET: ONSET: GRADUAL

## 2024-03-21 ASSESSMENT — PAIN DESCRIPTION - FREQUENCY: FREQUENCY: INTERMITTENT

## 2024-03-21 NOTE — PLAN OF CARE
Problem: Discharge Planning  Goal: Discharge to home or other facility with appropriate resources  Outcome: Progressing  Flowsheets (Taken 3/20/2024 0801 by Shwetha Cottrell, RN)  Discharge to home or other facility with appropriate resources:   Identify barriers to discharge with patient and caregiver   Arrange for needed discharge resources and transportation as appropriate   Identify discharge learning needs (meds, wound care, etc)     Problem: Pain  Goal: Verbalizes/displays adequate comfort level or baseline comfort level  Outcome: Progressing  Flowsheets (Taken 3/17/2024 0814 by Brandee Preciado LPN)  Verbalizes/displays adequate comfort level or baseline comfort level:   Encourage patient to monitor pain and request assistance   Assess pain using appropriate pain scale   Administer analgesics based on type and severity of pain and evaluate response     Problem: Safety - Adult  Goal: Free from fall injury  Outcome: Progressing  Flowsheets (Taken 3/19/2024 2150)  Free From Fall Injury: Instruct family/caregiver on patient safety

## 2024-03-21 NOTE — PLAN OF CARE
Problem: Discharge Planning  Goal: Discharge to home or other facility with appropriate resources  3/21/2024 0754 by Jo Ann Monterroso RN  Outcome: Progressing  3/20/2024 2123 by Tara Pineda RN  Outcome: Progressing  Flowsheets  Taken 3/20/2024 2017 by Tara Pineda RN  Discharge to home or other facility with appropriate resources:   Identify barriers to discharge with patient and caregiver   Arrange for needed discharge resources and transportation as appropriate   Identify discharge learning needs (meds, wound care, etc)   Arrange for interpreters to assist at discharge as needed  Taken 3/20/2024 0801 by Shwetha Cottrell RN  Discharge to home or other facility with appropriate resources:   Identify barriers to discharge with patient and caregiver   Arrange for needed discharge resources and transportation as appropriate   Identify discharge learning needs (meds, wound care, etc)     Problem: Pain  Goal: Verbalizes/displays adequate comfort level or baseline comfort level  3/21/2024 0754 by Jo Ann Monterroso RN  Outcome: Progressing  3/20/2024 2123 by Tara Pineda RN  Outcome: Progressing  Flowsheets (Taken 3/17/2024 0814 by Brandee Preciado LPN)  Verbalizes/displays adequate comfort level or baseline comfort level:   Encourage patient to monitor pain and request assistance   Assess pain using appropriate pain scale   Administer analgesics based on type and severity of pain and evaluate response     Problem: Safety - Adult  Goal: Free from fall injury  3/21/2024 0754 by Jo Ann Monterroso RN  Outcome: Progressing  3/20/2024 2123 by Tara Pineda RN  Outcome: Progressing  Flowsheets (Taken 3/19/2024 2150)  Free From Fall Injury: Instruct family/caregiver on patient safety

## 2024-03-22 LAB
ANION GAP SERPL CALC-SCNC: 6 MMOL/L (ref 5–15)
BASOPHILS # BLD: 0 K/UL (ref 0–0.1)
BASOPHILS NFR BLD: 0 % (ref 0–1)
BUN SERPL-MCNC: 11 MG/DL (ref 6–20)
BUN/CREAT SERPL: 16 (ref 12–20)
CALCIUM SERPL-MCNC: 8.2 MG/DL (ref 8.5–10.1)
CHLORIDE SERPL-SCNC: 109 MMOL/L (ref 97–108)
CO2 SERPL-SCNC: 21 MMOL/L (ref 21–32)
CREAT SERPL-MCNC: 0.68 MG/DL (ref 0.7–1.3)
DIFFERENTIAL METHOD BLD: ABNORMAL
EOSINOPHIL # BLD: 0.2 K/UL (ref 0–0.4)
EOSINOPHIL NFR BLD: 5 % (ref 0–7)
ERYTHROCYTE [DISTWIDTH] IN BLOOD BY AUTOMATED COUNT: 13 % (ref 11.5–14.5)
GLUCOSE BLD STRIP.AUTO-MCNC: 108 MG/DL (ref 65–117)
GLUCOSE BLD STRIP.AUTO-MCNC: 116 MG/DL (ref 65–117)
GLUCOSE BLD STRIP.AUTO-MCNC: 97 MG/DL (ref 65–117)
GLUCOSE SERPL-MCNC: 109 MG/DL (ref 65–100)
HCT VFR BLD AUTO: 30.9 % (ref 36.6–50.3)
HGB BLD-MCNC: 10.1 G/DL (ref 12.1–17)
IMM GRANULOCYTES # BLD AUTO: 0 K/UL (ref 0–0.04)
IMM GRANULOCYTES NFR BLD AUTO: 0 % (ref 0–0.5)
LYMPHOCYTES # BLD: 1.1 K/UL (ref 0.8–3.5)
LYMPHOCYTES NFR BLD: 21 % (ref 12–49)
MAGNESIUM SERPL-MCNC: 1.9 MG/DL (ref 1.6–2.4)
MCH RBC QN AUTO: 31.1 PG (ref 26–34)
MCHC RBC AUTO-ENTMCNC: 32.7 G/DL (ref 30–36.5)
MCV RBC AUTO: 95.1 FL (ref 80–99)
MONOCYTES # BLD: 0.8 K/UL (ref 0–1)
MONOCYTES NFR BLD: 15 % (ref 5–13)
NEUTS SEG # BLD: 3.1 K/UL (ref 1.8–8)
NEUTS SEG NFR BLD: 59 % (ref 32–75)
NRBC # BLD: 0 K/UL (ref 0–0.01)
NRBC BLD-RTO: 0 PER 100 WBC
PHOSPHATE SERPL-MCNC: 3.4 MG/DL (ref 2.6–4.7)
PLATELET # BLD AUTO: 155 K/UL (ref 150–400)
PMV BLD AUTO: 10.8 FL (ref 8.9–12.9)
POTASSIUM SERPL-SCNC: 3.6 MMOL/L (ref 3.5–5.1)
RBC # BLD AUTO: 3.25 M/UL (ref 4.1–5.7)
SERVICE CMNT-IMP: NORMAL
SODIUM SERPL-SCNC: 136 MMOL/L (ref 136–145)
WBC # BLD AUTO: 5.3 K/UL (ref 4.1–11.1)

## 2024-03-22 PROCEDURE — 2580000003 HC RX 258: Performed by: NURSE PRACTITIONER

## 2024-03-22 PROCEDURE — 2500000003 HC RX 250 WO HCPCS: Performed by: SURGERY

## 2024-03-22 PROCEDURE — 6370000000 HC RX 637 (ALT 250 FOR IP): Performed by: SURGERY

## 2024-03-22 PROCEDURE — 84100 ASSAY OF PHOSPHORUS: CPT

## 2024-03-22 PROCEDURE — 82962 GLUCOSE BLOOD TEST: CPT

## 2024-03-22 PROCEDURE — 6360000002 HC RX W HCPCS: Performed by: SURGERY

## 2024-03-22 PROCEDURE — C9113 INJ PANTOPRAZOLE SODIUM, VIA: HCPCS | Performed by: SURGERY

## 2024-03-22 PROCEDURE — 83735 ASSAY OF MAGNESIUM: CPT

## 2024-03-22 PROCEDURE — 1100000000 HC RM PRIVATE

## 2024-03-22 PROCEDURE — A4216 STERILE WATER/SALINE, 10 ML: HCPCS | Performed by: SURGERY

## 2024-03-22 PROCEDURE — 80048 BASIC METABOLIC PNL TOTAL CA: CPT

## 2024-03-22 PROCEDURE — 99024 POSTOP FOLLOW-UP VISIT: CPT | Performed by: NURSE PRACTITIONER

## 2024-03-22 PROCEDURE — 36415 COLL VENOUS BLD VENIPUNCTURE: CPT

## 2024-03-22 PROCEDURE — 2580000003 HC RX 258: Performed by: SURGERY

## 2024-03-22 PROCEDURE — 85025 COMPLETE CBC W/AUTO DIFF WBC: CPT

## 2024-03-22 PROCEDURE — 6360000002 HC RX W HCPCS: Performed by: NURSE PRACTITIONER

## 2024-03-22 RX ADMIN — I.V. FAT EMULSION 250 ML: 20 EMULSION INTRAVENOUS at 18:36

## 2024-03-22 RX ADMIN — SODIUM CHLORIDE, PRESERVATIVE FREE 10 ML: 5 INJECTION INTRAVENOUS at 10:41

## 2024-03-22 RX ADMIN — PROCHLORPERAZINE EDISYLATE 10 MG: 5 INJECTION INTRAMUSCULAR; INTRAVENOUS at 21:22

## 2024-03-22 RX ADMIN — DOXAZOSIN 4 MG: 2 TABLET ORAL at 10:40

## 2024-03-22 RX ADMIN — CARVEDILOL 12.5 MG: 12.5 TABLET, FILM COATED ORAL at 17:28

## 2024-03-22 RX ADMIN — SODIUM CHLORIDE, POTASSIUM CHLORIDE, SODIUM LACTATE AND CALCIUM CHLORIDE: 600; 310; 30; 20 INJECTION, SOLUTION INTRAVENOUS at 02:59

## 2024-03-22 RX ADMIN — SODIUM CHLORIDE, POTASSIUM CHLORIDE, SODIUM LACTATE AND CALCIUM CHLORIDE: 600; 310; 30; 20 INJECTION, SOLUTION INTRAVENOUS at 13:45

## 2024-03-22 RX ADMIN — FAMOTIDINE 20 MG: 20 TABLET ORAL at 21:08

## 2024-03-22 RX ADMIN — FAMOTIDINE 20 MG: 20 TABLET ORAL at 10:40

## 2024-03-22 RX ADMIN — KETOROLAC TROMETHAMINE 30 MG: 30 INJECTION, SOLUTION INTRAMUSCULAR at 21:10

## 2024-03-22 RX ADMIN — SODIUM CHLORIDE, PRESERVATIVE FREE 40 MG: 5 INJECTION INTRAVENOUS at 10:40

## 2024-03-22 RX ADMIN — MAGNESIUM SULFATE HEPTAHYDRATE: 500 INJECTION, SOLUTION INTRAMUSCULAR; INTRAVENOUS at 18:25

## 2024-03-22 RX ADMIN — KETOROLAC TROMETHAMINE 30 MG: 30 INJECTION, SOLUTION INTRAMUSCULAR at 01:05

## 2024-03-22 RX ADMIN — SODIUM CHLORIDE, POTASSIUM CHLORIDE, SODIUM LACTATE AND CALCIUM CHLORIDE: 600; 310; 30; 20 INJECTION, SOLUTION INTRAVENOUS at 22:52

## 2024-03-22 RX ADMIN — HYDROMORPHONE HYDROCHLORIDE 0.5 MG: 1 INJECTION, SOLUTION INTRAMUSCULAR; INTRAVENOUS; SUBCUTANEOUS at 21:22

## 2024-03-22 RX ADMIN — SODIUM CHLORIDE, PRESERVATIVE FREE 10 ML: 5 INJECTION INTRAVENOUS at 21:15

## 2024-03-22 RX ADMIN — KETOROLAC TROMETHAMINE 30 MG: 30 INJECTION, SOLUTION INTRAMUSCULAR at 06:17

## 2024-03-22 RX ADMIN — CARVEDILOL 12.5 MG: 12.5 TABLET, FILM COATED ORAL at 10:40

## 2024-03-22 RX ADMIN — AMLODIPINE BESYLATE 10 MG: 5 TABLET ORAL at 10:40

## 2024-03-22 RX ADMIN — ENOXAPARIN SODIUM 40 MG: 100 INJECTION SUBCUTANEOUS at 10:40

## 2024-03-22 ASSESSMENT — PAIN DESCRIPTION - DESCRIPTORS
DESCRIPTORS: SHARP
DESCRIPTORS: ACHING
DESCRIPTORS: ACHING

## 2024-03-22 ASSESSMENT — PAIN SCALES - GENERAL
PAINLEVEL_OUTOF10: 6
PAINLEVEL_OUTOF10: 3
PAINLEVEL_OUTOF10: 5
PAINLEVEL_OUTOF10: 0
PAINLEVEL_OUTOF10: 6
PAINLEVEL_OUTOF10: 3

## 2024-03-22 ASSESSMENT — PAIN DESCRIPTION - LOCATION
LOCATION: ABDOMEN

## 2024-03-22 ASSESSMENT — PAIN DESCRIPTION - ORIENTATION
ORIENTATION: ANTERIOR
ORIENTATION: ANTERIOR;LOWER
ORIENTATION: ANTERIOR

## 2024-03-22 NOTE — PLAN OF CARE
Problem: Discharge Planning  Goal: Discharge to home or other facility with appropriate resources  Outcome: Progressing  Flowsheets (Taken 3/22/2024 1049)  Discharge to home or other facility with appropriate resources: Identify barriers to discharge with patient and caregiver     Problem: Pain  Goal: Verbalizes/displays adequate comfort level or baseline comfort level  Outcome: Progressing     Problem: Safety - Adult  Goal: Free from fall injury  Outcome: Progressing

## 2024-03-23 LAB
ANION GAP SERPL CALC-SCNC: 5 MMOL/L (ref 5–15)
BUN SERPL-MCNC: 9 MG/DL (ref 6–20)
BUN/CREAT SERPL: 18 (ref 12–20)
CALCIUM SERPL-MCNC: 8.1 MG/DL (ref 8.5–10.1)
CHLORIDE SERPL-SCNC: 111 MMOL/L (ref 97–108)
CO2 SERPL-SCNC: 21 MMOL/L (ref 21–32)
CREAT SERPL-MCNC: 0.5 MG/DL (ref 0.7–1.3)
GLUCOSE BLD STRIP.AUTO-MCNC: 110 MG/DL (ref 65–117)
GLUCOSE BLD STRIP.AUTO-MCNC: 131 MG/DL (ref 65–117)
GLUCOSE BLD STRIP.AUTO-MCNC: 134 MG/DL (ref 65–117)
GLUCOSE BLD STRIP.AUTO-MCNC: 90 MG/DL (ref 65–117)
GLUCOSE SERPL-MCNC: 141 MG/DL (ref 65–100)
MAGNESIUM SERPL-MCNC: 1.7 MG/DL (ref 1.6–2.4)
PHOSPHATE SERPL-MCNC: 3.6 MG/DL (ref 2.6–4.7)
POTASSIUM SERPL-SCNC: 3.2 MMOL/L (ref 3.5–5.1)
SERVICE CMNT-IMP: ABNORMAL
SERVICE CMNT-IMP: ABNORMAL
SERVICE CMNT-IMP: NORMAL
SERVICE CMNT-IMP: NORMAL
SODIUM SERPL-SCNC: 137 MMOL/L (ref 136–145)

## 2024-03-23 PROCEDURE — 6360000002 HC RX W HCPCS: Performed by: SURGERY

## 2024-03-23 PROCEDURE — 80048 BASIC METABOLIC PNL TOTAL CA: CPT

## 2024-03-23 PROCEDURE — C9113 INJ PANTOPRAZOLE SODIUM, VIA: HCPCS | Performed by: SURGERY

## 2024-03-23 PROCEDURE — A4216 STERILE WATER/SALINE, 10 ML: HCPCS | Performed by: SURGERY

## 2024-03-23 PROCEDURE — 82962 GLUCOSE BLOOD TEST: CPT

## 2024-03-23 PROCEDURE — 36415 COLL VENOUS BLD VENIPUNCTURE: CPT

## 2024-03-23 PROCEDURE — 2580000003 HC RX 258: Performed by: SURGERY

## 2024-03-23 PROCEDURE — 6370000000 HC RX 637 (ALT 250 FOR IP): Performed by: SURGERY

## 2024-03-23 PROCEDURE — 2580000003 HC RX 258: Performed by: NURSE PRACTITIONER

## 2024-03-23 PROCEDURE — 1100000000 HC RM PRIVATE

## 2024-03-23 PROCEDURE — 83735 ASSAY OF MAGNESIUM: CPT

## 2024-03-23 PROCEDURE — 6360000002 HC RX W HCPCS: Performed by: NURSE PRACTITIONER

## 2024-03-23 PROCEDURE — 84100 ASSAY OF PHOSPHORUS: CPT

## 2024-03-23 PROCEDURE — 2500000003 HC RX 250 WO HCPCS: Performed by: SURGERY

## 2024-03-23 RX ORDER — POTASSIUM CHLORIDE 7.45 MG/ML
10 INJECTION INTRAVENOUS
Status: COMPLETED | OUTPATIENT
Start: 2024-03-23 | End: 2024-03-23

## 2024-03-23 RX ADMIN — HYDROMORPHONE HYDROCHLORIDE 0.5 MG: 1 INJECTION, SOLUTION INTRAMUSCULAR; INTRAVENOUS; SUBCUTANEOUS at 20:50

## 2024-03-23 RX ADMIN — KETOROLAC TROMETHAMINE 30 MG: 30 INJECTION, SOLUTION INTRAMUSCULAR at 18:54

## 2024-03-23 RX ADMIN — HYDROMORPHONE HYDROCHLORIDE 0.5 MG: 1 INJECTION, SOLUTION INTRAMUSCULAR; INTRAVENOUS; SUBCUTANEOUS at 23:41

## 2024-03-23 RX ADMIN — SODIUM CHLORIDE, POTASSIUM CHLORIDE, SODIUM LACTATE AND CALCIUM CHLORIDE: 600; 310; 30; 20 INJECTION, SOLUTION INTRAVENOUS at 17:37

## 2024-03-23 RX ADMIN — KETOROLAC TROMETHAMINE 30 MG: 30 INJECTION, SOLUTION INTRAMUSCULAR at 06:10

## 2024-03-23 RX ADMIN — CARVEDILOL 12.5 MG: 12.5 TABLET, FILM COATED ORAL at 18:15

## 2024-03-23 RX ADMIN — ENOXAPARIN SODIUM 40 MG: 100 INJECTION SUBCUTANEOUS at 08:22

## 2024-03-23 RX ADMIN — CARVEDILOL 12.5 MG: 12.5 TABLET, FILM COATED ORAL at 08:22

## 2024-03-23 RX ADMIN — DOXAZOSIN 4 MG: 2 TABLET ORAL at 08:21

## 2024-03-23 RX ADMIN — PROCHLORPERAZINE EDISYLATE 10 MG: 5 INJECTION INTRAMUSCULAR; INTRAVENOUS at 20:51

## 2024-03-23 RX ADMIN — SODIUM CHLORIDE, PRESERVATIVE FREE 40 MG: 5 INJECTION INTRAVENOUS at 08:23

## 2024-03-23 RX ADMIN — POTASSIUM CHLORIDE 10 MEQ: 10 INJECTION, SOLUTION INTRAVENOUS at 12:01

## 2024-03-23 RX ADMIN — POTASSIUM CHLORIDE 10 MEQ: 10 INJECTION, SOLUTION INTRAVENOUS at 10:27

## 2024-03-23 RX ADMIN — POTASSIUM CHLORIDE 10 MEQ: 10 INJECTION, SOLUTION INTRAVENOUS at 08:18

## 2024-03-23 RX ADMIN — SODIUM CHLORIDE, PRESERVATIVE FREE 10 ML: 5 INJECTION INTRAVENOUS at 21:26

## 2024-03-23 RX ADMIN — SODIUM CHLORIDE, PRESERVATIVE FREE 10 ML: 5 INJECTION INTRAVENOUS at 08:23

## 2024-03-23 RX ADMIN — FAMOTIDINE 20 MG: 20 TABLET ORAL at 20:51

## 2024-03-23 RX ADMIN — KETOROLAC TROMETHAMINE 30 MG: 30 INJECTION, SOLUTION INTRAMUSCULAR at 14:50

## 2024-03-23 RX ADMIN — AMLODIPINE BESYLATE 10 MG: 5 TABLET ORAL at 08:21

## 2024-03-23 RX ADMIN — FAMOTIDINE 20 MG: 20 TABLET ORAL at 08:22

## 2024-03-23 RX ADMIN — SODIUM CHLORIDE, POTASSIUM CHLORIDE, SODIUM LACTATE AND CALCIUM CHLORIDE: 600; 310; 30; 20 INJECTION, SOLUTION INTRAVENOUS at 08:30

## 2024-03-23 RX ADMIN — ONDANSETRON 4 MG: 2 INJECTION INTRAMUSCULAR; INTRAVENOUS at 18:54

## 2024-03-23 ASSESSMENT — PAIN DESCRIPTION - DESCRIPTORS
DESCRIPTORS: SHARP
DESCRIPTORS: STABBING
DESCRIPTORS: STABBING
DESCRIPTORS: SHARP

## 2024-03-23 ASSESSMENT — PAIN DESCRIPTION - ORIENTATION
ORIENTATION: ANTERIOR
ORIENTATION: MID
ORIENTATION: ANTERIOR

## 2024-03-23 ASSESSMENT — PAIN SCALES - GENERAL
PAINLEVEL_OUTOF10: 5
PAINLEVEL_OUTOF10: 1
PAINLEVEL_OUTOF10: 5
PAINLEVEL_OUTOF10: 5
PAINLEVEL_OUTOF10: 3
PAINLEVEL_OUTOF10: 0

## 2024-03-23 ASSESSMENT — PAIN DESCRIPTION - LOCATION
LOCATION: ABDOMEN

## 2024-03-24 VITALS
SYSTOLIC BLOOD PRESSURE: 112 MMHG | HEIGHT: 70 IN | WEIGHT: 170.64 LBS | TEMPERATURE: 98.2 F | BODY MASS INDEX: 24.43 KG/M2 | OXYGEN SATURATION: 97 % | DIASTOLIC BLOOD PRESSURE: 52 MMHG | RESPIRATION RATE: 16 BRPM | HEART RATE: 73 BPM

## 2024-03-24 LAB
ANION GAP SERPL CALC-SCNC: 8 MMOL/L (ref 5–15)
BUN SERPL-MCNC: 11 MG/DL (ref 6–20)
BUN/CREAT SERPL: 22 (ref 12–20)
CALCIUM SERPL-MCNC: 7.9 MG/DL (ref 8.5–10.1)
CHLORIDE SERPL-SCNC: 111 MMOL/L (ref 97–108)
CO2 SERPL-SCNC: 18 MMOL/L (ref 21–32)
CREAT SERPL-MCNC: 0.5 MG/DL (ref 0.7–1.3)
GLUCOSE BLD STRIP.AUTO-MCNC: 90 MG/DL (ref 65–117)
GLUCOSE BLD STRIP.AUTO-MCNC: 95 MG/DL (ref 65–117)
GLUCOSE SERPL-MCNC: 83 MG/DL (ref 65–100)
MAGNESIUM SERPL-MCNC: 1.4 MG/DL (ref 1.6–2.4)
PHOSPHATE SERPL-MCNC: 3.5 MG/DL (ref 2.6–4.7)
POTASSIUM SERPL-SCNC: 3.8 MMOL/L (ref 3.5–5.1)
SERVICE CMNT-IMP: NORMAL
SERVICE CMNT-IMP: NORMAL
SODIUM SERPL-SCNC: 137 MMOL/L (ref 136–145)

## 2024-03-24 PROCEDURE — A4216 STERILE WATER/SALINE, 10 ML: HCPCS | Performed by: SURGERY

## 2024-03-24 PROCEDURE — 83735 ASSAY OF MAGNESIUM: CPT

## 2024-03-24 PROCEDURE — 36415 COLL VENOUS BLD VENIPUNCTURE: CPT

## 2024-03-24 PROCEDURE — 82962 GLUCOSE BLOOD TEST: CPT

## 2024-03-24 PROCEDURE — 2580000003 HC RX 258: Performed by: SURGERY

## 2024-03-24 PROCEDURE — 6360000002 HC RX W HCPCS: Performed by: NURSE PRACTITIONER

## 2024-03-24 PROCEDURE — C9113 INJ PANTOPRAZOLE SODIUM, VIA: HCPCS | Performed by: SURGERY

## 2024-03-24 PROCEDURE — 2500000003 HC RX 250 WO HCPCS: Performed by: SURGERY

## 2024-03-24 PROCEDURE — 2580000003 HC RX 258: Performed by: NURSE PRACTITIONER

## 2024-03-24 PROCEDURE — 84100 ASSAY OF PHOSPHORUS: CPT

## 2024-03-24 PROCEDURE — 6360000002 HC RX W HCPCS: Performed by: SURGERY

## 2024-03-24 PROCEDURE — 6370000000 HC RX 637 (ALT 250 FOR IP): Performed by: SURGERY

## 2024-03-24 PROCEDURE — 80048 BASIC METABOLIC PNL TOTAL CA: CPT

## 2024-03-24 RX ORDER — ONDANSETRON 4 MG/1
4 TABLET, ORALLY DISINTEGRATING ORAL 3 TIMES DAILY PRN
Qty: 21 TABLET | Refills: 0 | Status: SHIPPED | OUTPATIENT
Start: 2024-03-24

## 2024-03-24 RX ORDER — OXYCODONE HYDROCHLORIDE 5 MG/1
5 TABLET ORAL EVERY 4 HOURS PRN
Qty: 12 TABLET | Refills: 0 | Status: SHIPPED | OUTPATIENT
Start: 2024-03-24 | End: 2024-03-27

## 2024-03-24 RX ADMIN — SODIUM CHLORIDE, PRESERVATIVE FREE 10 ML: 5 INJECTION INTRAVENOUS at 09:44

## 2024-03-24 RX ADMIN — PROCHLORPERAZINE EDISYLATE 10 MG: 5 INJECTION INTRAMUSCULAR; INTRAVENOUS at 13:45

## 2024-03-24 RX ADMIN — HYDROMORPHONE HYDROCHLORIDE 0.5 MG: 1 INJECTION, SOLUTION INTRAMUSCULAR; INTRAVENOUS; SUBCUTANEOUS at 04:46

## 2024-03-24 RX ADMIN — HYDROMORPHONE HYDROCHLORIDE 0.5 MG: 1 INJECTION, SOLUTION INTRAMUSCULAR; INTRAVENOUS; SUBCUTANEOUS at 13:45

## 2024-03-24 RX ADMIN — AMLODIPINE BESYLATE 10 MG: 5 TABLET ORAL at 09:44

## 2024-03-24 RX ADMIN — KETOROLAC TROMETHAMINE 30 MG: 30 INJECTION, SOLUTION INTRAMUSCULAR at 06:26

## 2024-03-24 RX ADMIN — PROCHLORPERAZINE EDISYLATE 10 MG: 5 INJECTION INTRAMUSCULAR; INTRAVENOUS at 04:47

## 2024-03-24 RX ADMIN — FAMOTIDINE 20 MG: 20 TABLET ORAL at 09:44

## 2024-03-24 RX ADMIN — SODIUM CHLORIDE, PRESERVATIVE FREE 40 MG: 5 INJECTION INTRAVENOUS at 09:44

## 2024-03-24 RX ADMIN — CARVEDILOL 12.5 MG: 12.5 TABLET, FILM COATED ORAL at 09:44

## 2024-03-24 RX ADMIN — SODIUM CHLORIDE, POTASSIUM CHLORIDE, SODIUM LACTATE AND CALCIUM CHLORIDE: 600; 310; 30; 20 INJECTION, SOLUTION INTRAVENOUS at 01:27

## 2024-03-24 RX ADMIN — KETOROLAC TROMETHAMINE 30 MG: 30 INJECTION, SOLUTION INTRAMUSCULAR at 01:24

## 2024-03-24 RX ADMIN — DOXAZOSIN 4 MG: 2 TABLET ORAL at 09:44

## 2024-03-24 RX ADMIN — ENOXAPARIN SODIUM 40 MG: 100 INJECTION SUBCUTANEOUS at 09:44

## 2024-03-24 ASSESSMENT — PAIN DESCRIPTION - ORIENTATION: ORIENTATION: ANTERIOR

## 2024-03-24 ASSESSMENT — PAIN DESCRIPTION - DESCRIPTORS: DESCRIPTORS: ACHING

## 2024-03-24 ASSESSMENT — PAIN SCALES - GENERAL: PAINLEVEL_OUTOF10: 5

## 2024-03-24 ASSESSMENT — PAIN DESCRIPTION - LOCATION: LOCATION: ABDOMEN

## 2024-03-24 NOTE — DISCHARGE INSTRUCTIONS
Please call [phone] to make a follow up appointment with Dr. Spear in 1 week     [facility]  [address] [place] [address], [address]  [place] [address]  [phone]  Fax [phone]    Please call [phone] to make an appointment with [facility] for a voiding trial and catheter removal.

## 2024-03-24 NOTE — PROGRESS NOTES
GI Progress Note (Nirav)  NAME:Dexter Flaherty :1955 MRN:[id number]   ATTG: MD Aicha  PCP: Mana Chris APRN - NP  Date/Time:  3/18/2024 7:59 AM   Assessment:     Post-op ileus- likely contribution from narcotic analgesia  Abnl GI CT- w/ SB dilatation and ileus  S/p hemicolectomy for adenoCA     Plan:     Mobilize  Decrease narcotic utilization   Attempt Miralax   Will sign off at this time as no active GI intervention planned.  Subjective:   Discussed with RN events overnight.  Had a BM yesterday PM and felt better afterwards, but still requesting narcotic analgesia.  States he is walking around.      Complaint Y/N Description   Abdominal Pain y minimal   Hematemesis     Hematochezia n    Melena n    Constipation     Diarrhea n    Dyspepsia n    Dysphagia     Jaundiced n    Nausea/vomiting n      Review of Systems:  Symptom Y/N Comments  Symptom Y/N Comments   Fever/Chills n   Chest Pain n    Cough n   Headaches n    Sputum n   Joint Pain n    SOB/MONIQUE n   Pruritis/Rash n    Tolerating Diet    Other       Could NOT obtain due to:      Objective:   VITALS:   Last 24hrs VS reviewed since prior progress note. Most recent are:  Vitals:    24 0732   BP: (!) 126/54   Pulse: 72   Resp: 18   Temp: 98.6 °F (37 °C)   SpO2: 95%       Intake/Output Summary (Last 24 hours) at 3/18/2024 0759  Last data filed at 3/18/2024 0619  Gross per 24 hour   Intake --   Output 1300 ml   Net -1300 ml     PHYSICAL EXAM:  General: WD, WN. Alert, cooperative, no acute distress    HEENT: NC, Atraumatic.  PERRL. Anicteric sclerae.  Lungs:  CTA Bilaterally. No Wheezing/Rhonchi/Rales.  Heart:  Regular  rhythm,  No murmur/Rub/Gallops  Abdomen: Soft, +minimally distended, Non tender.  +BS  Extremities: No c/c/e  Neurologic:  CN 2-12 gi, A/O X 3.  No acute neurological distress   Psych:   Good insight. Not anxious nor agitated.    Lab and Radiology Data Reviewed: (see below)    Medications Reviewed: (see below)  PMH/SH reviewed 
  Physician Progress Note      PATIENT:               GOYO LOZA  CSN #:                  [id number]  :                       1955  ADMIT DATE:       3/15/2024 7:47 AM  DISCH DATE:  RESPONDING  PROVIDER #:        Mark Spear MD          QUERY TEXT:    Pt admitted with ileus.  Noted documentation of \" Post-op ileus\" (3/18 gi pn).    If possible, please document in progress notes and discharge summary:    The medical record reflects the following:  Risk Factors: POD4 s/p right colectomy, hx colon ca  Clinical Indicators: 3/18 gi pn- Post-op ileus  Treatment: bowel rest, ivfs,gi cx  Thanks, Rukhsana Callejas RN/CDI [phone]  Options provided:  -- Post-op ileus confirmed present on admission  -- Post-op ileus ruled out  -- Defer to gi consultant documentation regarding  Post-op ileus  -- Other - I will add my own diagnosis  -- Disagree - Not applicable / Not valid  -- Disagree - Clinically unable to determine / Unknown  -- Refer to Clinical Documentation Reviewer    PROVIDER RESPONSE TEXT:    The diagnosis of Post-op ileus was confirmed as present on admission.    Query created by: Rukhsana Callejas on 3/18/2024 2:06 PM      Electronically signed by:  Mark Spear MD 3/18/2024 2:16 PM          
Acute Care Surgery Progress Note    Patient is s/p right colectomy for hepatic flexure adenocarcinoma on 3/11/24. Re admitted for nausea and bloating for observation.     Subjective/24 hour events:    Pt resting in chair.   -Still no bowel function but feels overall improved  -Feels rumbling  -No emesis    Objective:    VSS Afebrile.    Vitals:    03/17/24 0059 03/17/24 0318 03/17/24 0559 03/17/24 0814   BP:  (!) 119/56  (!) 127/59   Pulse:  70  66   Resp: 17 17 17 16   Temp:  98.4 °F (36.9 °C)  98.1 °F (36.7 °C)   TempSrc:  Oral  Oral   SpO2:  94%  95%   Weight:       Height:            General: Well-appearing, no acute distress  HEENT: Extraocular muscles intact, trachea midline, normal range of motion  Lungs: Normal work of breathing, nontachypneic  Heart: Regular rate and rhythm  Abdomen: Soft, mildly distended, mild TTP improved from yesterday  Extremities: Warm, well-perfused  Neuro: Grossly intact  Psych: Appropriate       Labs:    Reviewed, no leukocytosis. Unremarkable.     Body mass index is 24.48 kg/m².       Assessment/Plan:    Likely ileus, continue bowel rest and current treatment. Awaiting return of bowel function.     Continue fluids  Keep augustin  Ambulate  Sips of clears   Serial exams      Elder Friedman MD  General Surgery    
Acute Care Surgery Progress Note    Patient is s/p right colectomy for hepatic flexure adenocarcinoma on 3/11/24. Re admitted for nausea and bloating for observation.     Subjective/24 hour events:    Pt resting in chair.   -Urinary retention requiring Augustin o/n with 800 mL out  -Some distension, bloating, nausea  -No emesis  -No flatus     Objective:    VSS Afebrile.    Vitals:    03/16/24 0843 03/16/24 0851 03/16/24 0857 03/16/24 1220   BP: 118/61      Pulse: 68      Resp:  18  17   Temp:       TempSrc:       SpO2: 96% 99% 95%    Weight:       Height:            General: Well-appearing, no acute distress  HEENT: Extraocular muscles intact, trachea midline, normal range of motion  Lungs: Normal work of breathing, nontachypneic  Heart: Regular rate and rhythm  Abdomen: Soft, mildly distended, mild TTP   Extremities: Warm, well-perfused  Neuro: Grossly intact  Psych: Appropriate       Labs:    Reviewed, no leukocytosis. Unremarkable.     Body mass index is 24.48 kg/m².       Assessment/Plan:    Likely ileus, continue bowel rest and current treatment.     Continue fluids  Keep augustin  Ambulate  Sips of clears   Serial exams  If nausea worsens significantly, will need NGT      Elder Friedman MD  General Surgery    
Admission assessment completed by this RN upon arrival to inpatient unit.  Care plan and education initiated.  PARVEZ Irvin to assume care of patient at this time.       Lynn Myers RN    
Admit Date: 3/15/2024  POD #10  Status/Post:  s/p right colectomy    Assessment:     Principal Problem:    Ileus (HCC)  Resolved Problems:    * No resolved hospital problems. *      Plan/Recommendations/Medical Decision Making:     Ileus seems to be slowly resolving with +BMs and flatus but remains distended    TPN- continue today since PO intake remains poor  May Shower  Mobilize    Home with leg bag once tolerates diet- f/u with [facility] for voiding trial.        -------------------------------------------------------------------------------------------------------------------      Subjective:     Patient having for bowel movements. Denies nausea, hiccups, belching. Still feeling bloated. Left leg edema.     Objective:     Blood pressure (!) 118/55, pulse 78, temperature 97.7 °F (36.5 °C), temperature source Oral, resp. rate 18, height 1.778 m (5' 10\"), weight 77.4 kg (170 lb 10.2 oz), SpO2 97 %.    Temp (24hrs), Av.7 °F (37.1 °C), Min:97.7 °F (36.5 °C), Max:99.7 °F (37.6 °C)      Physical Exam:   Abdominal exam: soft, distended  appropriately tender.  Wound: clean, dry, no drainage      Labs:   Recent Results (from the past 24 hour(s))   POCT Glucose    Collection Time: 24  6:57 PM   Result Value Ref Range    POC Glucose 105 65 - 117 mg/dL    Performed by: Opal Connolly PCT    POCT Glucose    Collection Time: 24 12:13 AM   Result Value Ref Range    POC Glucose 116 65 - 117 mg/dL    Performed by: Dania Raines PCT    CBC with Auto Differential    Collection Time: 24  1:27 AM   Result Value Ref Range    WBC 5.3 4.1 - 11.1 K/uL    RBC 3.25 (L) 4.10 - 5.70 M/uL    Hemoglobin 10.1 (L) 12.1 - 17.0 g/dL    Hematocrit 30.9 (L) 36.6 - 50.3 %    MCV 95.1 80.0 - 99.0 FL    MCH 31.1 26.0 - 34.0 PG    MCHC 32.7 30.0 - 36.5 g/dL    RDW 13.0 11.5 - 14.5 %    Platelets 155 150 - 400 K/uL    MPV 10.8 8.9 - 12.9 FL    Nucleated RBCs 0.0 0  WBC    nRBC 0.00 0.00 - 0.01 K/uL    Neutrophils 
Admit Date: 3/15/2024  POD #8  Status/Post:  s/p right colectomy    Assessment:     Principal Problem:    Ileus (HCC)  Resolved Problems:    * No resolved hospital problems. *      Plan/Recommendations/Medical Decision Making:     Failed voiding trial yesterday. Augustin was to be reinserted by 8pm, but not place until after midnight    Pt had a miserable night    Seemed to be tolerating liquids prior to removing the augustin, but per nursing: \"continues to struggle with nausea/vomiting\"    Abdomen seems softer after placement of the augustin    Hard assess weather bladder over distention is masking other intraabdominal pathology    Will check a CT scan.    Check labs tomorrow    Keep augustin    -------------------------------------------------------------------------------------------------------------------      Subjective:     Patient has no new complaints.   Positive Flatus: small amt yesterday    Objective:     Blood pressure (!) 119/52, pulse 73, temperature 99 °F (37.2 °C), resp. rate 16, height 1.778 m (5' 10\"), weight 77.4 kg (170 lb 10.2 oz), SpO2 95 %.    Temp (24hrs), Av.2 °F (37.3 °C), Min:98.6 °F (37 °C), Max:99.9 °F (37.7 °C)      Physical Exam:   Abdominal exam: soft  distended  appropriatly tender.  Wound: clean, dry, no drainage    Labs:   Recent Results (from the past 24 hour(s))   CBC with Auto Differential    Collection Time: 24  9:44 AM   Result Value Ref Range    WBC 4.1 4.1 - 11.1 K/uL    RBC 3.62 (L) 4.10 - 5.70 M/uL    Hemoglobin 11.2 (L) 12.1 - 17.0 g/dL    Hematocrit 33.8 (L) 36.6 - 50.3 %    MCV 93.4 80.0 - 99.0 FL    MCH 30.9 26.0 - 34.0 PG    MCHC 33.1 30.0 - 36.5 g/dL    RDW 12.7 11.5 - 14.5 %    Platelets 128 (L) 150 - 400 K/uL    MPV 10.6 8.9 - 12.9 FL    Nucleated RBCs 0.5 (H) 0  WBC    nRBC 0.02 (H) 0.00 - 0.01 K/uL    Neutrophils % 55 32 - 75 %    Lymphocytes % 22 12 - 49 %    Monocytes % 18 (H) 5 - 13 %    Eosinophils % 4 0 - 7 %    Basophils % 1 0 - 1 %    Immature 
Admit Date: 3/15/2024  POD #9  Status/Post:  s/p right colectomy    Assessment:     Principal Problem:    Ileus (HCC)  Resolved Problems:    * No resolved hospital problems. *      Plan/Recommendations/Medical Decision Making:     Ileus secondary to urinary retention is resolving    +BMs    Resume diet    Home as soon as tomorrow with leg bag if tolerates diet       -------------------------------------------------------------------------------------------------------------------      Subjective:     Patient has no new complaints.   Positive Flatus: small amt yesterday    Objective:     Blood pressure (!) 116/46, pulse 72, temperature 98.6 °F (37 °C), temperature source Oral, resp. rate 18, height 1.778 m (5' 10\"), weight 77.4 kg (170 lb 10.2 oz), SpO2 94 %.    Temp (24hrs), Av.5 °F (36.9 °C), Min:98.1 °F (36.7 °C), Max:98.8 °F (37.1 °C)      Physical Exam:   Abdominal exam: soft  distended  appropriatly tender.  Wound: clean, dry, no drainage    Labs:   Recent Results (from the past 24 hour(s))   CBC with Auto Differential    Collection Time: 24  1:35 AM   Result Value Ref Range    WBC 4.5 4.1 - 11.1 K/uL    RBC 3.35 (L) 4.10 - 5.70 M/uL    Hemoglobin 10.4 (L) 12.1 - 17.0 g/dL    Hematocrit 31.2 (L) 36.6 - 50.3 %    MCV 93.1 80.0 - 99.0 FL    MCH 31.0 26.0 - 34.0 PG    MCHC 33.3 30.0 - 36.5 g/dL    RDW 12.9 11.5 - 14.5 %    Platelets 150 150 - 400 K/uL    MPV 11.0 8.9 - 12.9 FL    Nucleated RBCs 0.0 0  WBC    nRBC 0.00 0.00 - 0.01 K/uL    Neutrophils % 59 32 - 75 %    Lymphocytes % 20 12 - 49 %    Monocytes % 18 (H) 5 - 13 %    Eosinophils % 3 0 - 7 %    Basophils % 0 0 - 1 %    Immature Granulocytes 0 0.0 - 0.5 %    Neutrophils Absolute 2.7 1.8 - 8.0 K/UL    Lymphocytes Absolute 0.9 0.8 - 3.5 K/UL    Monocytes Absolute 0.8 0.0 - 1.0 K/UL    Eosinophils Absolute 0.1 0.0 - 0.4 K/UL    Basophils Absolute 0.0 0.0 - 0.1 K/UL    Absolute Immature Granulocyte 0.0 0.00 - 0.04 K/UL    Differential Type 
Bedside shift change report given to Rylie DARDEN (oncoming nurse) by Earline DARDEN (offgoing nurse). Report included the following information Nurse Handoff Report, Index, Intake/Output, MAR, Recent Results, Med Rec Status, Cardiac Rhythm  , Neuro Assessment, and Event Log.          End of Shift Note    Bedside shift change report given to Radha DARDEN (oncoming nurse) by Rylie Gutierrez RN (offgoing nurse).  Report included the following information SBAR, Kardex, Intake/Output, MAR, Med Rec Status, Cardiac Rhythm  , and Alarm Parameters     Shift worked:  11pm-7am     Shift summary and any significant changes:     Medicated with PRN dilaudid, foleys remain in place, lab works done     Concerns for physician to address:  xxx     Zone phone for oncoming shift:   xxx       Activity:     Number times ambulated in hallways past shift: 0  Number of times OOB to chair past shift: 0    Cardiac:   Cardiac Monitoring: Yes           Access:  Current line(s): PIV     Genitourinary:   Urinary status: augustin    Respiratory:      Chronic home O2 use?: NO  Incentive spirometer at bedside: NO       GI:     Current diet:  Diet NPO Exceptions are: Sips of Water with Meds, Sips of Clear Liquids  Passing flatus: YES  Tolerating current diet: YES       Pain Management:   Patient states pain is manageable on current regimen: YES    Skin:     Interventions: float heels, increase time out of bed, PT/OT consult, internal/external urinary devices, and nutritional support    Patient Safety:  Fall Score:    Interventions: bed/chair alarm, assistive device (walker, cane. etc), gripper socks, pt to call before getting OOB, and stay with me (per policy)       Length of Stay:  Expected LOS: 1  Actual LOS: 0      Rylie Gutierrez RN                           
DISCHARGE NOTE FROM SURGICAL-TELEMETRY NURSE    Patient determined to be stable for discharge by attending provider. I have reviewed the discharge instructions and follow-up appointments with the Patient and Spouse. They verbalized understanding and all questions were answered to their satisfaction. No complaints or further questions were expressed.      Medications sent to pharmacy Appropriate educational materials and medication side effect teaching were provided.      PICC were removed prior to discharge.     Patient discharged with augustin    All personal items collected during admission were returned to the patient prior to discharge.    Post-op patient: Yes - Patient given post-op discharge kit and instructed on use.      Madonan Venegas RN       
End of Shift Note    Bedside shift change report given to *** (oncoming nurse) by Helen Wyatt RN (offgoing nurse).  Report included the following information SBAR, Kardex, and MAR    Shift worked:  7p-7a     Shift summary and any significant changes:    Scheduled medications were given, see MAR.  IV has been flushed and the patient is getting maintenance fluid.  Patient was given Dilaudid twice, see PRN MAR.  Augustin care has been done.  Patient teaching and routine rounding has been done.       Concerns for physician to address:       Zone phone for oncoming shift:          Activity:     Number times ambulated in hallways past shift: 0  Number of times OOB to chair past shift: 0    Cardiac:   Cardiac Monitoring: No           Access:  Current line(s): PIV     Genitourinary:   Urinary status: augustin    Respiratory:      Chronic home O2 use?: NO  Incentive spirometer at bedside: NO       GI:     Current diet:  Diet NPO Exceptions are: Sips of Water with Meds, Sips of Clear Liquids  Passing flatus: YES  Tolerating current diet: YES       Pain Management:   Patient states pain is manageable on current regimen: YES    Skin:     Interventions: internal/external urinary devices    Patient Safety:  Fall Score:    Interventions: bed/chair alarm and pt to call before getting OOB       Length of Stay:  Expected LOS: 1  Actual LOS: 0      Helen Wyatt RN                            
End of Shift Note    Bedside shift change report given to ADELSO Cervantes (oncoming nurse) by Brandee Preciado LPN (offgoing nurse).  Report included the following information SBAR    Shift worked:  7a-7p     Shift summary and any significant changes:     Pt ambulated to the chair today, tolerated well. Pt had some complaints of pain, prn pain medication given with positive effect. Uneventful day.      Concerns for physician to address:  none     Zone phone for oncoming shift:   [phone]           Brandee Preciado LPN                            
End of Shift Note    Bedside shift change report given to ADELSO Irvin (oncoming nurse) by Fozia Rust RN (offgoing nurse).  Report included the following information SBAR, Kardex, OR Summary, and Intake/Output    Shift worked:  7p-7a     Shift summary and any significant changes:     Pt tolerated pain well. Unable to get AM labs, attempted 4x. No other significant changes.     Concerns for physician to address:  none     Zone phone for oncoming shift:   [phone]       Activity:     Number times ambulated in hallways past shift: 0  Number of times OOB to chair past shift: 0    Cardiac:   Cardiac Monitoring: No           Access:  Current line(s): PIV     Genitourinary:   Urinary status: augustin    Respiratory:      Chronic home O2 use?: NO  Incentive spirometer at bedside: YES       GI:     Current diet:  Diet NPO Exceptions are: Sips of Water with Meds, Sips of Clear Liquids  Passing flatus: YES  Tolerating current diet: YES       Pain Management:   Patient states pain is manageable on current regimen: YES    Skin:     Interventions: turn team, specialty bed, float heels, and increase time out of bed    Patient Safety:  Fall Score:    Interventions: bed/chair alarm, assistive device (walker, cane. etc), gripper socks, and pt to call before getting OOB       Length of Stay:  Expected LOS: 1  Actual LOS: 0      Fozia Rust RN                            
End of Shift Note    Bedside shift change report given to ADELSO Mills (oncoming nurse) by Brandee Preciado LPN (offgoing nurse).  Report included the following information SBAR    Shift worked:  7a-7p     Shift summary and any significant changes:     Pt ambulated in the halls today, tolerated well. Pt had some complaints of pain, prn pain medication with positive effect. Uneventful day.      Concerns for physician to address:  none     Zone phone for oncoming shift:  [phone]             Brandee Preciado LPN                              
End of Shift Note    Bedside shift change report given to ADELSO Rodarte (oncoming nurse) by Hema Jones RN (offgoing nurse).  Report included the following information SBAR, Kardex, Procedure Summary, Intake/Output, MAR, and Recent Results    Shift worked:  7p-730a     Shift summary and any significant changes:     Rested in bed through shift. Treated pain prn-see MAR. No c/o nausea. BM x1-watery per pt. Tolerating diet. AM labs drawn.      Concerns for physician to address:       Zone phone for oncoming shift:            Length of Stay:  Expected LOS: 8  Actual LOS: 4      Hema Jones RN                            
End of Shift Note    Bedside shift change report given to ADELSO Rodarte/ ADELSO Peacock (oncoming nurse) by Asuncion Ferrari RN (offgoing nurse).  Report included the following information SBAR, Kardex, Intake/Output, MAR, and Recent Results    Shift worked:  7pm-7am     Shift summary and any significant changes:     Patient had a good rest during the shift. Pain was well controlled with PRN pain meds. However, pt still have poor appetite.   Had watery BM 2x and was able to pass gas.   Abdominal distention still present.   Wing catheter still draining well with clear yellow urine. Able to walk in the hallway and inside the room.   AM labs completed.   Concerns for physician to address: K 3.2    Zone phone for oncoming shift:  ----         Length of Stay:  Expected LOS: 9  Actual LOS: 5      Asuncion Ferrari RN                            
End of Shift Note    Bedside shift change report given to ADELSO Roland (oncoming nurse) by Madonna Venegas RN (offgoing nurse).  Report included the following information SBAR    Shift worked:  3762-3430     Shift summary and any significant changes:    VS stable  Up to the chair almost the whole shift  No pain meds given  Started new TPN and Lipids around 6pm  Had multiple episode loose stools this shift  Incision sites clean and dry and intact  Uneventful shift   Concerns for physician to address: None   Zone phone for oncoming shift:             Madonna Venegas RN                            
End of Shift Note    Bedside shift change report given to ADELSO Roland (oncoming nurse) by Madonna Venegas RN (offgoing nurse).  Report included the following information SBAR    Shift worked:  9500-9152     Shift summary and any significant changes:    VS stable  Denies pain  Kcl IV given x3  Decreased TPN  Possible discharged tomorrow     Concerns for physician to address: None     Zone phone for oncoming shift:             Madonna Venegas RN                            
End of Shift Note    Bedside shift change report given to Jo Ann (oncoming nurse) by Helen Wyatt RN (offgoing nurse).  Report included the following information SBAR, Kardex, and MAR    Shift worked:  7p-7a     Shift summary and any significant changes:     Scheduled medications were given, see MAR.  Dilaudid was given twice, see PRN MAR.  IV has been flushed and the patient is getting maintenance fluid.  I tried drawing morning labs twice and I was unsuccessful, I subsequently called PICC team.  Augustin care was done.  Patient teaching and routine rounding has been done.     Concerns for physician to address:       Zone phone for oncoming shift:          Activity:     Number times ambulated in hallways past shift: 0  Number of times OOB to chair past shift: 0    Cardiac:   Cardiac Monitoring: No           Access:  Current line(s): PIV     Genitourinary:   Urinary status: augustin    Respiratory:      Chronic home O2 use?: NO  Incentive spirometer at bedside: NO       GI:     Current diet:  Diet NPO Exceptions are: Sips of Water with Meds, Sips of Clear Liquids  Passing flatus: YES  Tolerating current diet: YES       Pain Management:   Patient states pain is manageable on current regimen: YES    Skin:     Interventions: internal/external urinary devices    Patient Safety:  Fall Score:    Interventions: bed/chair alarm, gripper socks, and pt to call before getting OOB       Length of Stay:  Expected LOS: 1  Actual LOS: 0      Helen Wyatt RN                            
End of Shift Note    Bedside shift change report given to Madonna RN/ ADELSO Rodarte (oncoming nurse) by Asuncion Ferrari RN (offgoing nurse).  Report included the following information SBAR, Kardex, Intake/Output, MAR, and Recent Results    Shift worked:  7pm-7am     Shift summary and any significant changes:     Pt tolerated his dinner last night, but had 1 episode of vomiting and reported of pain 3x. IV dilaudid were given. Reported of nausea 2x as well, gave compazine as ordered.   Ambulating well in the hallway and inside the room. Still with augustin.  Labs completed this morning.   Concerns for physician to address: ---   Zone phone for oncoming shift:  ---            Length of Stay:  Expected LOS: 9  Actual LOS: 6      Asuncion Ferrari RN                            
End of Shift Note    Bedside shift change report given to Tara (oncoming nurse) by Demetria Platt RN (offgoing nurse).  Report included the following information SBAR, Kardex, Intake/Output, MAR, and Recent Results    Shift worked:  7a-7p     Shift summary and any significant changes:     Down to CT to follow up for distention and pain, vomiting this AM, 600mL brown foul smelling emesis, compazine ordered, nausea resolved at shift change, toradol added q6, order in for NGT if vomits again     Concerns for physician to address:  none     Zone phone for oncoming shift:            Demetria Platt, RN                            
I attempted to get AM labs and was unsuccessful two time. The PCT also tried two times and was unsuccessful. Patient requested a break, since he was stuck 4 times.   
Pharmacy TPN Management Note    TPN indication: ileus, not tolerating diet     TPN type: Central     Macronutrients:  Protein: 90 g  Dextrose: 200 g  Lipids: 6 days per week    Rate: 62.5 ml/hr     Labs:  Recent Labs     Units 24  0135   NA mmol/L 136 134*   K mmol/L 3.7 3.8   CL mmol/L 110* 108   CO2 mmol/L 20* 21   MG mg/dL 1.9  --    PHOS MG/DL 3.0  --    BUN MG/DL 13 11     Recent Labs     Units 24  031   AST U/L 33   ALT U/L 55     Recent Labs     Units 24   WBC K/uL 3.3*   HGB g/dL 9.7*   HCT % 29.5*   TRIG MG/       Electrolytes (dosed per LITER):  Na: 35 meq/L; K: 30 meq/L; Phos:15 mmol/L; M meq/L; Ca 4.5 meq/L    Other additives in TPN: multivitamins  and trace elements    Impression/Plan:   TPN macronutrient/rate changes: adjust AA to 90g, dextrose to 200 g to provide 1040 KCal  TPN electrolyte changes: n/a  TPN insulin changes: n/a  Pt has PICC line so TPN transitioned to central formula today     Pharmacy will continue to monitor enteral nutrition plan, electrolytes, renal function, and dietician recommendations and adjust parenteral nutrition as needed.    Thank you,  Eliza Barraza RP      
Pharmacy TPN Management Note    TPN indication: ileus, not tolerating diet     TPN type: Central     Macronutrients:  Protein: 90 g  Dextrose: 200 g  Lipids: 6 days per week    Rate: 75 ml/hr     Labs:  Recent Labs     Units 24  0127 24  03124  0135   NA mmol/L 136 136 134*   K mmol/L 3.6 3.7 3.8   CL mmol/L 109* 110* 108   CO2 mmol/L 21 20* 21   MG mg/dL 1.9 1.9  --    PHOS MG/DL 3.4 3.0  --    BUN MG/DL 11 13 11     Recent Labs     Units 24  031   AST U/L 33   ALT U/L 55     Recent Labs     Units 24  01224   WBC K/uL 5.3 3.3*   HGB g/dL 10.1* 9.7*   HCT % 30.9* 29.5*   TRIG MG/DL  --  104       Electrolytes (dosed per LITER):  Na: 35 meq/L; K: 30 meq/L; Phos:15 mmol/L; M meq/L; Ca 4.5 meq/L    Other additives in TPN: multivitamins  and trace elements    Impression/Plan:   TPN macronutrient/rate changes: adjust AA to 90g, increase dextrose to 300 g per dietitian since BG <200 to provide 1380 KCal  TPN electrolyte changes: n/a  TPN insulin changes: n/a  Provide fats 6 days per week (entered ordered from 3/21-3/26). Will need re-order fats on 3/28.     Pharmacy will continue to monitor enteral nutrition plan, electrolytes, renal function, and dietician recommendations and adjust parenteral nutrition as needed.    Thank you,  Eliza Barraza [facility]        
Pharmacy TPN Management Note    TPN indication: ileus, not tolerating diet     TPN type: Peripheral    Macronutrients:  Protein: 42.5 g  Dextrose: 100 g  Lipids: none    Rate: 41.7 ml/hr     Labs:  Recent Labs     Units 24  0135 24  0944   NA mmol/L 134* 134*   K mmol/L 3.8 3.9   CL mmol/L 108 107   CO2 mmol/L 21 23   BUN MG/DL 11 10     No results for input(s): \"AST\", \"ALT\" in the last 72 hours.    Invalid input(s): \"BILIRUBIN\", \"AP\"  Recent Labs     Units 24  0135   WBC K/uL 4.5   HGB g/dL 10.4*   HCT % 31.2*       Electrolytes (dosed per LITER):  Na: 35 meq/L; K: 30 meq/L; Phos:15 mmol/L; M meq/L; Ca 4.5 meq/L    Other additives in TPN: multivitamins  and trace elements    Impression/Plan:   TPN macronutrient/rate changes: new start  TPN electrolyte changes: new start  TPN insulin changes: n/a  Pt to get a PICC line today so TPN likely can be transitioned to central formula tomorrow      Pharmacy will continue to monitor enteral nutrition plan, electrolytes, renal function, and dietician recommendations and adjust parenteral nutrition as needed.    Thank you,  Monica Sosa [facility]    
SURGERY PROGRESS NOTE      Admit Date: 3/15/2024    POD * No surgery found *    Procedure: * No surgery found *      Subjective:     Patient feels better today.  Less bloated.  Had 9 Bms.  Passing flatus.  Wants to try to eat.  Has questions about outpatient lovenox.    Objective:     BP (!) 137/58   Pulse 66   Temp 98.2 °F (36.8 °C) (Oral)   Resp 16   Ht 1.778 m (5' 10\")   Wt 77.4 kg (170 lb 10.2 oz)   SpO2 97%   BMI 24.48 kg/m²      Temp (24hrs), Av.4 °F (36.9 °C), Min:97.9 °F (36.6 °C), Max:98.8 °F (37.1 °C)      No intake/output data recorded.   1901 -  0700  In: 600 [P.O.:600]  Out: 3525 [Urine:3525]    Physical Exam:    General:  alert, appears stated age, cooperative, and no distress   Abdomen: soft, bowel sounds active, non-tender   Incision:   healing well, no drainage, no erythema, no hernia, no seroma, no swelling, no dehiscence, incision well approximated         CBC:   Lab Results   Component Value Date/Time    WBC 5.3 2024 01:27 AM    RBC 3.25 2024 01:27 AM    HGB 10.1 2024 01:27 AM    HCT 30.9 2024 01:27 AM    MCV 95.1 2024 01:27 AM    MCH 31.1 2024 01:27 AM    MCHC 32.7 2024 01:27 AM    RDW 13.0 2024 01:27 AM     2024 01:27 AM    MPV 10.8 2024 01:27 AM     BMP:    Lab Results   Component Value Date/Time     2024 04:45 AM    K 3.2 2024 04:45 AM     2024 04:45 AM    CO2 21 2024 04:45 AM    BUN 9 2024 04:45 AM    LABALBU 2.5 2024 03:11 AM    CREATININE 0.50 2024 04:45 AM    CALCIUM 8.1 2024 04:45 AM    LABGLOM >60 2024 04:45 AM    GLUCOSE 141 2024 04:45 AM       Assessment:     Principal Problem:    Ileus (HCC)  Resolved Problems:    * No resolved hospital problems. *    Ileus resolving    Plan:      Replace potassium  Wean TPN  Regular diet  Discharge plannning  
Surgery NP Progress Note    Dexter Flaherty  [id number]  male  68 y.o.  1955    Discharge date: 24      Admitting Physician: Mark Spear MD      Date of Surgery:   3/11/2024     Preoperative Diagnosis:  Colon cancer (HCC) [C18.9]    Pt seen with Dr. Spear      Assessment:   Principal Problem:    Ileus (HCC)  Resolved Problems:    * No resolved hospital problems. *      Expected post-op progress.     Plan/Recommendations/Medical Decision Making:     - Mobilize with nursing and OOB to chair for meals  - CLEAR LIQUID DIET   - Pain management- Continue current pain control methods.   - VTE Prophylaxis: Lovenox     Subjective:     Patient with bowel function. Still with slight distention. No nausea.     Objective:     Blood pressure (!) 126/54, pulse 72, temperature 98.6 °F (37 °C), temperature source Oral, resp. rate 20, height 1.778 m (5' 10\"), weight 77.4 kg (170 lb 10.2 oz), SpO2 95 %.    Temp (24hrs), Av.4 °F (36.9 °C), Min:97.9 °F (36.6 °C), Max:98.6 °F (37 °C)      Pt resting in bed NAD   Incisions CDI.   Abd softly distended.    SCDs for mechanical DVT proph while in bed     Body mass index is 24.48 kg/m².     Reference: BMI greater than 30 is classified as obesity and greater than 40 is classified as morbid obesity.       Chayito Denson, KINJAL - NP   MSN, APRN, FNP-C, CWOCN-AP, RNAS-C    24     
Today this patient has ambulated the entire length of our tuttle and back 6 times. He has ambulated in the room as well. He has sat up in the chair several times. His pain is controlled with the Dilauded, but the patient continues to struggle with nausea/vomiting. Patient's augustin removed this afternoon. The patient has not yet been able to void even with ambulation and standing to urinate. The patient's abdomen is more distended than it was this morning. He is not able to pass gas. Patient is also complaining of acidic heart burn. Protonix ordered to address that.   
Absolute 0.6 0.0 - 1.0 K/UL    Eosinophils Absolute 0.1 0.0 - 0.4 K/UL    Basophils Absolute 0.0 0.0 - 0.1 K/UL    Absolute Immature Granulocyte 0.0 0.00 - 0.04 K/UL    Differential Type AUTOMATED     Basic Metabolic Panel    Collection Time: 03/21/24  3:11 AM   Result Value Ref Range    Sodium 136 136 - 145 mmol/L    Potassium 3.7 3.5 - 5.1 mmol/L    Chloride 110 (H) 97 - 108 mmol/L    CO2 20 (L) 21 - 32 mmol/L    Anion Gap 6 5 - 15 mmol/L    Glucose 132 (H) 65 - 100 mg/dL    BUN 13 6 - 20 MG/DL    Creatinine 0.75 0.70 - 1.30 MG/DL    Bun/Cre Ratio 17 12 - 20      Est, Glom Filt Rate >60 >60 ml/min/1.73m2    Calcium 8.2 (L) 8.5 - 10.1 MG/DL   Hepatic Function Panel    Collection Time: 03/21/24  3:11 AM   Result Value Ref Range    Total Protein 5.9 (L) 6.4 - 8.2 g/dL    Albumin 2.5 (L) 3.5 - 5.0 g/dL    Globulin 3.4 2.0 - 4.0 g/dL    Albumin/Globulin Ratio 0.7 (L) 1.1 - 2.2      Total Bilirubin 0.5 0.2 - 1.0 MG/DL    Bilirubin, Direct 0.2 0.0 - 0.2 MG/DL    Alk Phosphatase 88 45 - 117 U/L    AST 33 15 - 37 U/L    ALT 55 12 - 78 U/L   Magnesium    Collection Time: 03/21/24  3:11 AM   Result Value Ref Range    Magnesium 1.9 1.6 - 2.4 mg/dL   Phosphorus    Collection Time: 03/21/24  3:11 AM   Result Value Ref Range    Phosphorus 3.0 2.6 - 4.7 MG/DL       Data Review     
Scale  Current BMI (kg/m2): 24.5                          BMI Categories: Normal Weight (BMI 22.0 to 24.9) age over 65    Estimated Daily Nutrient Needs:  Energy Requirements Based On: Formula  Weight Used for Energy Requirements: Current  Energy (kcal/day): MSJ 2000 (1541 x 1.3)  Weight Used for Protein Requirements: Current  Protein (g/day): 77-92g (1-1.2gPro/kg)  Method Used for Fluid Requirements: 1 ml/kcal  Fluid (ml/day): 2000mL    Nutrition Diagnosis:   Inadequate protein-energy intake related to altered GI function, altered GI structure as evidenced by NPO or clear liquid status due to medical condition, nausea, vomiting, other (comment) (ileus per imaging)  Previous dx resolving, TPN advancing.     Nutrition Interventions:   Food and/or Nutrient Delivery: Continue Current Diet, Continue Current Parenteral Nutrition, Modify Parenteral Nutrition  Nutrition Education/Counseling: No recommendation at this time  Coordination of Nutrition Care: Continue to monitor while inpatient       Goals:  Previous Goal Met: Progressing toward Goal(s)  Goals: Tolerate nutrition support at goal rate, by next RD assessment, PO intake 50% or greater (with BG <200mg/dL and lytes WNL)       Nutrition Monitoring and Evaluation:   Behavioral-Environmental Outcomes: None Identified  Food/Nutrient Intake Outcomes: Food and Nutrient Intake, Parenteral Nutrition Intake/Tolerance  Physical Signs/Symptoms Outcomes: Biochemical Data, Nutrition Focused Physical Findings, Skin, Weight, GI Status    Discharge Planning:    Too soon to determine     Domenica Jones RD, [facility]  Contact: ext [phone]    
Evaluation:   Behavioral-Environmental Outcomes: None Identified  Food/Nutrient Intake Outcomes: Parenteral Nutrition Intake/Tolerance, IVF Intake  Physical Signs/Symptoms Outcomes: Biochemical Data, Nutrition Focused Physical Findings, Skin, Weight, GI Status    Discharge Planning:    Too soon to determine     Domenica Jones RD, [facility]  Contact: ext [phone]

## 2024-03-24 NOTE — DISCHARGE SUMMARY
DISCHARGE SUMMARY      Patient ID:  Dexter Flaherty  [id number]  68 y.o.  1955    Admit date: 3/15/2024    Discharge date and time: No discharge date for patient encounter.     Admitting Physician: Mark Spear MD     Discharge Physician: Asim Gruber MD      Admission Diagnoses: Ileus (HCC) [K56.7]  Acute urinary retention [R33.8]    Discharge Diagnoses: Principal Problem:    Ileus (HCC)  Resolved Problems:    * No resolved hospital problems. *      Procedures: * No surgery found *    Consults: none        Hospital Course:   Patient admitted after right hemicolectomy with an ileus and urinary retention.       Ileus - He was managed with bowel rest, IVF and TPN.  He had slow return of bowel function.  Two CT scans during this admission were consistent with ileus. He started having bowel movements and flatus on HD#5.  He tolerated advancement of his diet.  On discharge he was tolerating po, passing flatus, less bloated  and having bowel movements.      Urinary retention -  He had a augustin inserted on admission and was found to have over 800cc in retention. The augustin was removed 48 hours latera and he could not void so, it was reinserted.   He is being discharged home with the augustin with follow up with urology for voiding trial.      D/C Disposition: home     Patient Instructions:   Current Discharge Medication List        START taking these medications    Details   enoxaparin (LOVENOX) 40 MG/0.4ML Inject 0.4 mLs into the skin daily for 28 days  Qty: 11.2 mL, Refills: 0           CONTINUE these medications which have NOT CHANGED    Details   ondansetron (ZOFRAN-ODT) 4 MG disintegrating tablet Take 1 tablet by mouth 3 times daily as needed for Nausea or Vomiting  Qty: 21 tablet, Refills: 0      metFORMIN (GLUCOPHAGE) 500 MG tablet Take 1 tablet by mouth 2 times daily (with meals)      DORZOLAMIDE HCL-TIMOLOL MAL OP Apply 2 drops to eye 2 times daily      Ferrous Sulfate (IRON) 325 (65 Fe) MG TABS Take 65 mg by

## 2024-03-24 NOTE — CARE COORDINATION
CM needs completed - spouse to transport at d/c    Initial note: CM acknowledged d/c. Chart reviewed. Pt will return home with family support; no post-acute therapy needs identified for d/c. 2nd IM provided by unit [facility] 3/22/24; additional copy not necessary at this time. Pt's spouse will provide transportation at the time of d/c. CM will remain accessible for consult if additional needs arise prior to d/c.     03/24/24 1107   Services At/After Discharge   Transition of Care Consult (CM Consult) N/A  (No CM consults - no CM needs)   Services At/After Discharge None   [place] Resource Information Provided? No   Mode of Transport at Discharge Other (see comment)  (Spouse will provide transportation at the time of d/c)   Confirm Follow Up Transport Family   Condition of Participation: Discharge Planning   The Plan for Transition of Care is related to the following treatment goals: Home with family support - no post-acute therapy needs identified for d/c   The Patient and/or Patient Representative was provided with a Choice of Provider? Patient   The Patient and/Or Patient Representative agree with the Discharge Plan? Yes   Freedom of Choice list was provided with basic dialogue that supports the patient's individualized plan of care/goals, treatment preferences, and shares the quality data associated with the providers?  Yes     KOKI Lee  [facility] CM   [phone]

## 2024-03-24 NOTE — PLAN OF CARE
Problem: Discharge Planning  Goal: Discharge to home or other facility with appropriate resources  Outcome: Adequate for Discharge  Flowsheets (Taken 3/24/2024 0967)  Discharge to home or other facility with appropriate resources:   Identify barriers to discharge with patient and caregiver   Arrange for needed discharge resources and transportation as appropriate   Identify discharge learning needs (meds, wound care, etc)     Problem: Pain  Goal: Verbalizes/displays adequate comfort level or baseline comfort level  3/24/2024 1155 by Madonna Venegas, ADELSO  Outcome: Adequate for Discharge  3/24/2024 0104 by Asuncion Ferrari, RN  Outcome: Progressing     Problem: Safety - Adult  Goal: Free from fall injury  Outcome: Adequate for Discharge

## 2024-03-26 ENCOUNTER — TELEPHONE (OUTPATIENT)
Age: 69
End: 2024-03-26

## 2024-03-26 NOTE — TELEPHONE ENCOUNTER
Patient called and said he wanted to know why he has injections to give himself. He said they were not at the pharmacy when he was d/c'ed from the hospital on 3/12/24 and he was not told that he would have to take them.  I told him that Chayito Denson NP ordered them and usually the staff goes over his d/c paperwork before he leaves. I told him that I would speak with Chayito and get back with him. He understood.  He was also concerned that he has a augustin catheter in place and does not want to wait to see the urologist until 4/8/24. I told him that I would reach out to the urologist and get back with him by tomorrow evening and he understood.

## 2024-03-26 NOTE — TELEPHONE ENCOUNTER
Spoke with patient regarding the rationale for the injections. It is unclear why there was a pharmacy delay as this medication was sent days in advance of his actual hospital discharge. Discussed process for self administering a SC injection and directed patient to online how-to video

## 2024-03-27 NOTE — TELEPHONE ENCOUNTER
Spoke with patient and let him know that I have a call out to urology to see if he can be seen sooner. They took a message and the nurse will call the patient and let him know if he can get into the office sooner.  He said that Chayito Denson NP called and explained the lovenox to him and he has not other questions or concerns.

## 2024-04-10 ENCOUNTER — OFFICE VISIT (OUTPATIENT)
Age: 69
End: 2024-04-10

## 2024-04-10 VITALS
WEIGHT: 152.8 LBS | SYSTOLIC BLOOD PRESSURE: 99 MMHG | OXYGEN SATURATION: 94 % | BODY MASS INDEX: 21.88 KG/M2 | DIASTOLIC BLOOD PRESSURE: 64 MMHG | TEMPERATURE: 97.5 F | RESPIRATION RATE: 16 BRPM | HEIGHT: 70 IN | HEART RATE: 82 BPM

## 2024-04-10 DIAGNOSIS — R10.31 RLQ ABDOMINAL PAIN: Primary | ICD-10-CM

## 2024-04-10 DIAGNOSIS — R10.31 RLQ ABDOMINAL PAIN: ICD-10-CM

## 2024-04-10 DIAGNOSIS — C18.9 COLON CANCER (HCC): Primary | ICD-10-CM

## 2024-04-10 LAB
ALBUMIN SERPL-MCNC: 2.8 G/DL (ref 3.5–5)
ALBUMIN/GLOB SERPL: 0.8 (ref 1.1–2.2)
ALP SERPL-CCNC: 121 U/L (ref 45–117)
ALT SERPL-CCNC: 67 U/L (ref 12–78)
ANION GAP SERPL CALC-SCNC: 7 MMOL/L (ref 5–15)
AST SERPL-CCNC: 63 U/L (ref 15–37)
BASOPHILS # BLD: 0.1 K/UL (ref 0–0.1)
BASOPHILS NFR BLD: 1 % (ref 0–1)
BILIRUB SERPL-MCNC: 0.4 MG/DL (ref 0.2–1)
BUN SERPL-MCNC: 10 MG/DL (ref 6–20)
BUN/CREAT SERPL: 13 (ref 12–20)
CALCIUM SERPL-MCNC: 9.3 MG/DL (ref 8.5–10.1)
CHLORIDE SERPL-SCNC: 106 MMOL/L (ref 97–108)
CO2 SERPL-SCNC: 26 MMOL/L (ref 21–32)
CREAT SERPL-MCNC: 0.75 MG/DL (ref 0.7–1.3)
DIFFERENTIAL METHOD BLD: ABNORMAL
EOSINOPHIL # BLD: 0.1 K/UL (ref 0–0.4)
EOSINOPHIL NFR BLD: 2 % (ref 0–7)
ERYTHROCYTE [DISTWIDTH] IN BLOOD BY AUTOMATED COUNT: 13.2 % (ref 11.5–14.5)
GLOBULIN SER CALC-MCNC: 3.5 G/DL (ref 2–4)
GLUCOSE SERPL-MCNC: 132 MG/DL (ref 65–100)
HCT VFR BLD AUTO: 34 % (ref 36.6–50.3)
HGB BLD-MCNC: 10.7 G/DL (ref 12.1–17)
IMM GRANULOCYTES # BLD AUTO: 0 K/UL
IMM GRANULOCYTES NFR BLD AUTO: 0 %
LYMPHOCYTES # BLD: 1.5 K/UL (ref 0.8–3.5)
LYMPHOCYTES NFR BLD: 21 % (ref 12–49)
MCH RBC QN AUTO: 29.8 PG (ref 26–34)
MCHC RBC AUTO-ENTMCNC: 31.5 G/DL (ref 30–36.5)
MCV RBC AUTO: 94.7 FL (ref 80–99)
MONOCYTES # BLD: 1.3 K/UL (ref 0–1)
MONOCYTES NFR BLD: 18 % (ref 5–13)
NEUTS BAND NFR BLD MANUAL: 5 % (ref 0–6)
NEUTS SEG # BLD: 4.2 K/UL (ref 1.8–8)
NEUTS SEG NFR BLD: 53 % (ref 32–75)
NRBC # BLD: 0 K/UL (ref 0–0.01)
NRBC BLD-RTO: 0 PER 100 WBC
PLATELET # BLD AUTO: 185 K/UL (ref 150–400)
PMV BLD AUTO: 10.8 FL (ref 8.9–12.9)
POTASSIUM SERPL-SCNC: 3.8 MMOL/L (ref 3.5–5.1)
PROT SERPL-MCNC: 6.3 G/DL (ref 6.4–8.2)
RBC # BLD AUTO: 3.59 M/UL (ref 4.1–5.7)
RBC MORPH BLD: ABNORMAL
SODIUM SERPL-SCNC: 139 MMOL/L (ref 136–145)
WBC # BLD AUTO: 7.2 K/UL (ref 4.1–11.1)

## 2024-04-10 PROCEDURE — 99024 POSTOP FOLLOW-UP VISIT: CPT | Performed by: SURGERY

## 2024-04-10 RX ORDER — TAMSULOSIN HYDROCHLORIDE 0.4 MG/1
0.4 CAPSULE ORAL DAILY
COMMUNITY
Start: 2024-04-02

## 2024-04-10 RX ORDER — DICLOFENAC SODIUM 75 MG/1
75 TABLET, DELAYED RELEASE ORAL DAILY
COMMUNITY
Start: 2024-03-19

## 2024-04-10 ASSESSMENT — PATIENT HEALTH QUESTIONNAIRE - PHQ9
2. FEELING DOWN, DEPRESSED OR HOPELESS: NOT AT ALL
1. LITTLE INTEREST OR PLEASURE IN DOING THINGS: NOT AT ALL
SUM OF ALL RESPONSES TO PHQ QUESTIONS 1-9: 0
SUM OF ALL RESPONSES TO PHQ QUESTIONS 1-9: 0
SUM OF ALL RESPONSES TO PHQ9 QUESTIONS 1 & 2: 0
SUM OF ALL RESPONSES TO PHQ QUESTIONS 1-9: 0
SUM OF ALL RESPONSES TO PHQ QUESTIONS 1-9: 0

## 2024-04-10 NOTE — PROGRESS NOTES
Chief Complaint   Patient presents with    Post-Op Check     S/p Laparoscopic right colectomy with intracorporeal anastomosis, robot assisted on 3/11/24   Prolonged ileus postop    Reviewed pathology and provided a copy  T3N0    Will ensure he has an appointment with oncology    Tolerating PO  BMs: reports diarrhea    Pain controlled without pain meds      Physical Exam:   Abdominal exam: Soft, mild distention, appropriatly tender.    Wound: clean, dry, no drainage    BP on the low side. 99/59, asymptomatic    Will check labs and CT scan    Saw urology for urinary retention. Now on two alpha blockers: Flomax and Cardura  Wing still in  May need other BP meds adjusted      Add probiotic for the diarrhea.  He will start taking his wife's Align    Further management after the studies      Continue unrestricted activity.  Follow-up: 2-3 weeks        Mark Spear MD FACS

## 2024-04-10 NOTE — PROGRESS NOTES
Identified pt with two pt identifiers (name and ). Reviewed chart in preparation for visit and have obtained necessary documentation.    Dexter Flaherty is a 68 y.o. male  Chief Complaint   Patient presents with    Post-Op Check     S/p Laparoscopic right colectomy with intracorporeal anastomosis, robot assisted on 3/11/24     BP 99/64 (Site: Right Upper Arm, Position: Sitting)   Pulse 82   Temp 97.5 °F (36.4 °C) (Oral)   Resp 16   Ht 1.778 m (5' 10\")   Wt 69.3 kg (152 lb 12.8 oz)   SpO2 94%   BMI 21.92 kg/m²     1. Have you been to the ER, urgent care clinic since your last visit?  Hospitalized since your last visit? Re hospitalized for ileus    2. Have you seen or consulted any other health care providers outside of the [facility] since your last visit?  Include any pap smears or colon screening. no

## 2024-04-12 ENCOUNTER — TELEPHONE (OUTPATIENT)
Age: 69
End: 2024-04-12

## 2024-04-12 ENCOUNTER — HOSPITAL ENCOUNTER (OUTPATIENT)
Facility: HOSPITAL | Age: 69
End: 2024-04-12
Attending: SURGERY
Payer: MEDICARE

## 2024-04-12 DIAGNOSIS — R10.31 RLQ ABDOMINAL PAIN: ICD-10-CM

## 2024-04-12 PROCEDURE — 2500000003 HC RX 250 WO HCPCS: Performed by: RADIOLOGY

## 2024-04-12 PROCEDURE — 74177 CT ABD & PELVIS W/CONTRAST: CPT

## 2024-04-12 PROCEDURE — 6360000004 HC RX CONTRAST MEDICATION: Performed by: RADIOLOGY

## 2024-04-12 RX ADMIN — BARIUM SULFATE 450 ML: 20 SUSPENSION ORAL at 15:27

## 2024-04-12 RX ADMIN — IOPAMIDOL 100 ML: 755 INJECTION, SOLUTION INTRAVENOUS at 15:27

## 2024-04-12 NOTE — TELEPHONE ENCOUNTER
Pts wife called in regards to the lab work he had done, she said his liver enzymes are high and wants to know what could be the cause. Please advise [phone]

## 2024-04-15 ENCOUNTER — TELEPHONE (OUTPATIENT)
Age: 69
End: 2024-04-15

## 2024-04-15 ENCOUNTER — OFFICE VISIT (OUTPATIENT)
Age: 69
End: 2024-04-15
Payer: MEDICARE

## 2024-04-15 ENCOUNTER — CLINICAL DOCUMENTATION (OUTPATIENT)
Age: 69
End: 2024-04-15

## 2024-04-15 VITALS
SYSTOLIC BLOOD PRESSURE: 110 MMHG | BODY MASS INDEX: 21.33 KG/M2 | HEIGHT: 70 IN | OXYGEN SATURATION: 97 % | HEART RATE: 65 BPM | DIASTOLIC BLOOD PRESSURE: 56 MMHG | WEIGHT: 149 LBS | TEMPERATURE: 97.8 F

## 2024-04-15 DIAGNOSIS — K74.60 CIRRHOSIS (HCC): Primary | ICD-10-CM

## 2024-04-15 DIAGNOSIS — R52 PAIN: Primary | ICD-10-CM

## 2024-04-15 DIAGNOSIS — C18.9 COLON ADENOCARCINOMA (HCC): Primary | ICD-10-CM

## 2024-04-15 PROCEDURE — 99205 OFFICE O/P NEW HI 60 MIN: CPT | Performed by: INTERNAL MEDICINE

## 2024-04-15 RX ORDER — OXYCODONE HYDROCHLORIDE 5 MG/1
5 TABLET ORAL EVERY 8 HOURS PRN
Qty: 15 TABLET | Refills: 0 | Status: SHIPPED | OUTPATIENT
Start: 2024-04-15 | End: 2024-04-22

## 2024-04-15 NOTE — PROGRESS NOTES
[facility] Social Work   Psychosocial Assessment    [x] Med-Onc [facility] [] Med-Onc [facility] [] Med-Onc [facility] [] Rad-Onc [facility] [] Rad-Onc [facility] [] Rad-Onc [facility] [] Rad-Onc [facility] [] [facility] Center       Patient: Dexter Flaherty    Reason for Assessment:    [] Social Work Referral  [x] Initial Assessment  [x] New Diagnosis  [] Other:     Advance Care Planning:  [] AMD Discussed and Completed  [] AMD Reviewed Verbally [] AMD Copy Provided  [x] Conversation Deferred [] Conversation Declined      Sources of Information:    [x] Patient  [x] Family  [] Staff  [] Medical Record    Mental Status:    [x] Alert  [] Lethargic  [] Unresponsive  [] Unable to assess   Oriented to: [x] Person  [x] Place  [x] Time  [x] Situation      Relationship Status:  [] Single  [x]   [] Significant Other/Life Partner  []   []   []     Living Circumstances:  [] Lives Alone  [x] Family/Significant Other in Household  [] Roommates  [] Children in the Home  [] Paid Caregivers  [] Assisted Living Facility/Group Home  [] Skilled Nursing Facility  [] Homeless  [] Incarcerated  [] Environmental/Care Concerns  [] Other:    Employment Status:   [] Employed Full-time  [] Employed Part-time  [] Homemaker  [x] Retired  [] Short-Term Disability  [] Long-Term Disability  [] Unemployed  [] SSI  [] SSDI  [] Self-Employment    Transportation Resources:   [x] Self  [x] Family/Friends  [] Insurance  [] Community Programs  [] Other:    Barriers to Learning:    [] Language  [] Developmental  [] Cognitive  [] Altered Mental Status  [] Visual/Hearing Impairment  [] Unable to Read/Write  [] Motivational  [] Challenges Understanding Medical Jargon [x] No Barriers Identified      Financial/Legal Concerns:    [] Uninsured  [] Limited Income/Resources  [] Non-Citizen  [] Food Insecurity [x] No Concerns Identified   [] Other:    Scientologist/Spiritual/Existential:   Does patient have any spiritual or Pentecostal beliefs? [x] Yes [] No  Is the patient

## 2024-04-15 NOTE — TELEPHONE ENCOUNTER
Reviewed labs.  Alk phos is mildly elevated 121 and AST is mildly elevated 63.  Bilirubin normal.    CT scan completed.  Reviewed and independently interpreted the images.  Overall looks better.  Ileus is resolved.  The distal small bowel still is a little thickened consistent with \"enteritis\".  No obstruction.  Subtle cirrhotic changes noted on the liver.    Eating better  Bowels working    Saw urology today.  He is currently undergoing a urination trial.  May have to get the Wing back in.    Seeing Dr. Lousi later today.    Overall appears to be improving.    I have asked him to avoid the Tylenol.  Use ibuprofen as needed.  Can add some oxycodone on top of that if he needs it.    Orders Placed This Encounter    oxyCODONE (ROXICODONE) 5 MG immediate release tablet     Sig: Take 1 tablet by mouth every 8 hours as needed for Pain for up to 7 days. Intended supply: 3 days. Take lowest dose possible to manage pain Max Daily Amount: 15 mg     Dispense:  15 tablet     Refill:  0     Reduce doses taken as pain becomes manageable     He reports quitting alcohol in August.   Will refer to Dr. Valerio for cirrhosis: Intraoperative nodular appearance, CT evidence, elevated LFTs    If he develops right upper quadrant pain or other biliary colic symptoms, may need to gallbladder workup.    He expressed understanding of our discussion and is agreeable with the plan.

## 2024-04-15 NOTE — PROGRESS NOTES
Dexter Flaherty is a 68 y.o. male here for new patient appt for colon cancer.  Referred by Dr Spear.  3/11/24 right colectomy  Pt here with wife Octavia.  Pt reports his pain is improving everyday from Ileus and surgery.  Level 1 today.  Seeing urology this afternoon to see if Flomax is working as it should.     1. Have you been to the ER, urgent care clinic since your last visit?  Hospitalized since your last visit? New Pt    2. Have you seen or consulted any other health care providers outside of the [facility] since your last visit?  Include any pap smears or colon screening. New Pt

## 2024-04-16 ENCOUNTER — CLINICAL DOCUMENTATION (OUTPATIENT)
Age: 69
End: 2024-04-16

## 2024-04-16 NOTE — PROGRESS NOTES
NCCN Distress Thermometer    Date Screening Completed:     Screening Declined:  [] Yes    Number that best describes how much distress you've experienced in the past week, including today?  0 [] - No distress 1 []      2 []      3 []      4 []       5 []       6 []      7 []      8 [x]      9 []       10 [] - Extreme distress    PROBLEM LIST  Have you had concerns about any of the items below in the past week, including today?      Physical Concerns Practical Concerns   [x] Pain [] Taking care of myself    [x] Sleep [] Taking care of others    [x] Fatigue [] Work   [x] Tobacco use  [] School   [] Substance use  [] Housing   [] Memory or concentration [] Finances   [] Sexual health [] Insurance   [] Changes in eating  [] Transportation   [] Loss or change of physical abilities  []     [] Having enough food   Emotional Concerns [] Access to medicine   [] Worry or anxiety [] Treatment decisions   [] Sadness or depression    [] Loss of interest or enjoyment  Spiritual or Scientologist Concerns   [] Grief or loss  [] Sense of meaning or purpose   [] Fear [] Changes in veronica or beliefs   [] Loneliness  [] Death, dying, or afterlife   [] Anger [] Conflict between beliefs and cancer treatments    [] Changes in appearance [] Relationship with the sacred   [] Feelings of worthlessness or being a burden [] Ritual or dietary needs        Social Concerns     [] Relationship with spouse or partner     [] Relationship with children    [] Relationship with family members     [] Relationship with friends or coworkers     [] Communication with health care team     [] Ability to have children     [] Prejudice or discrimination        Other Concerns: pt received new that he will not need any additional treatment and thus have blood work every 3 & 9 months and scans at 6 and 12 months.   Pt and spouse relieved.  Pt loves to golf and hopes to feel up to it soon.      Patient received resource information and education:  [] Yes  [x]

## 2024-04-23 ENCOUNTER — TELEPHONE (OUTPATIENT)
Age: 69
End: 2024-04-23

## 2024-04-24 ENCOUNTER — CLINICAL DOCUMENTATION (OUTPATIENT)
Age: 69
End: 2024-04-24

## 2024-04-24 NOTE — PROGRESS NOTES
Left v/m to schedule NP appt referred by Dr. Spear dx Cirrhosis (HCC)  schedule next available record/referral in [facility]

## 2024-06-18 DIAGNOSIS — C18.9 COLON ADENOCARCINOMA (HCC): ICD-10-CM

## 2024-06-18 LAB
ALBUMIN SERPL-MCNC: 3.7 G/DL (ref 3.5–5)
ALBUMIN/GLOB SERPL: 1.1 (ref 1.1–2.2)
ALP SERPL-CCNC: 91 U/L (ref 45–117)
ALT SERPL-CCNC: 26 U/L (ref 12–78)
ANION GAP SERPL CALC-SCNC: 7 MMOL/L (ref 5–15)
AST SERPL-CCNC: 24 U/L (ref 15–37)
BASOPHILS # BLD: 0.1 K/UL (ref 0–0.1)
BASOPHILS NFR BLD: 2 % (ref 0–1)
BILIRUB SERPL-MCNC: 0.3 MG/DL (ref 0.2–1)
BUN SERPL-MCNC: 8 MG/DL (ref 6–20)
BUN/CREAT SERPL: 13 (ref 12–20)
CALCIUM SERPL-MCNC: 9 MG/DL (ref 8.5–10.1)
CEA SERPL-MCNC: 4.3 NG/ML
CHLORIDE SERPL-SCNC: 112 MMOL/L (ref 97–108)
CO2 SERPL-SCNC: 22 MMOL/L (ref 21–32)
CREAT SERPL-MCNC: 0.61 MG/DL (ref 0.7–1.3)
DIFFERENTIAL METHOD BLD: ABNORMAL
EOSINOPHIL # BLD: 0.3 K/UL (ref 0–0.4)
EOSINOPHIL NFR BLD: 5 % (ref 0–7)
ERYTHROCYTE [DISTWIDTH] IN BLOOD BY AUTOMATED COUNT: 14.2 % (ref 11.5–14.5)
GLOBULIN SER CALC-MCNC: 3.3 G/DL (ref 2–4)
GLUCOSE SERPL-MCNC: 170 MG/DL (ref 65–100)
HCT VFR BLD AUTO: 35.1 % (ref 36.6–50.3)
HGB BLD-MCNC: 11.1 G/DL (ref 12.1–17)
IMM GRANULOCYTES # BLD AUTO: 0 K/UL
IMM GRANULOCYTES NFR BLD AUTO: 0 %
LYMPHOCYTES # BLD: 1.5 K/UL (ref 0.8–3.5)
LYMPHOCYTES NFR BLD: 29 % (ref 12–49)
MCH RBC QN AUTO: 29.5 PG (ref 26–34)
MCHC RBC AUTO-ENTMCNC: 31.6 G/DL (ref 30–36.5)
MCV RBC AUTO: 93.4 FL (ref 80–99)
MONOCYTES # BLD: 0.3 K/UL (ref 0–1)
MONOCYTES NFR BLD: 6 % (ref 5–13)
NEUTS SEG # BLD: 2.8 K/UL (ref 1.8–8)
NEUTS SEG NFR BLD: 58 % (ref 32–75)
NRBC # BLD: 0 K/UL (ref 0–0.01)
NRBC BLD-RTO: 0 PER 100 WBC
PLATELET # BLD AUTO: 146 K/UL (ref 150–400)
PMV BLD AUTO: 11.3 FL (ref 8.9–12.9)
POTASSIUM SERPL-SCNC: 4.1 MMOL/L (ref 3.5–5.1)
PROT SERPL-MCNC: 7 G/DL (ref 6.4–8.2)
RBC # BLD AUTO: 3.76 M/UL (ref 4.1–5.7)
RBC MORPH BLD: ABNORMAL
RBC MORPH BLD: ABNORMAL
SODIUM SERPL-SCNC: 141 MMOL/L (ref 136–145)
WBC # BLD AUTO: 5 K/UL (ref 4.1–11.1)

## 2024-08-15 ENCOUNTER — OFFICE VISIT (OUTPATIENT)
Age: 69
End: 2024-08-15
Payer: MEDICARE

## 2024-08-15 VITALS
HEIGHT: 70 IN | HEART RATE: 88 BPM | DIASTOLIC BLOOD PRESSURE: 55 MMHG | TEMPERATURE: 98.6 F | WEIGHT: 168.2 LBS | SYSTOLIC BLOOD PRESSURE: 122 MMHG | OXYGEN SATURATION: 97 % | RESPIRATION RATE: 18 BRPM | BODY MASS INDEX: 24.08 KG/M2

## 2024-08-15 DIAGNOSIS — E78.00 HYPERCHOLESTEROLEMIA: Primary | ICD-10-CM

## 2024-08-15 DIAGNOSIS — Z87.19 H/O BILATERAL INGUINAL HERNIA REPAIR: ICD-10-CM

## 2024-08-15 DIAGNOSIS — D69.6 THROMBOCYTOPENIA (HCC): ICD-10-CM

## 2024-08-15 DIAGNOSIS — Z98.890 H/O BILATERAL INGUINAL HERNIA REPAIR: ICD-10-CM

## 2024-08-15 DIAGNOSIS — K70.30 ALCOHOLIC CIRRHOSIS OF LIVER WITHOUT ASCITES (HCC): ICD-10-CM

## 2024-08-15 DIAGNOSIS — Z90.49 H/O PARTIAL RESECTION OF COLON: ICD-10-CM

## 2024-08-15 PROBLEM — E11.9 TYPE 2 DIABETES MELLITUS (HCC): Status: ACTIVE | Noted: 2024-08-15

## 2024-08-15 PROBLEM — I10 HYPERTENSION: Status: ACTIVE | Noted: 2024-08-15

## 2024-08-15 PROBLEM — N40.0 BPH (BENIGN PROSTATIC HYPERPLASIA): Status: ACTIVE | Noted: 2024-08-15

## 2024-08-15 PROCEDURE — 3074F SYST BP LT 130 MM HG: CPT | Performed by: INTERNAL MEDICINE

## 2024-08-15 PROCEDURE — G8427 DOCREV CUR MEDS BY ELIG CLIN: HCPCS | Performed by: INTERNAL MEDICINE

## 2024-08-15 PROCEDURE — 99204 OFFICE O/P NEW MOD 45 MIN: CPT | Performed by: INTERNAL MEDICINE

## 2024-08-15 PROCEDURE — 3078F DIAST BP <80 MM HG: CPT | Performed by: INTERNAL MEDICINE

## 2024-08-15 PROCEDURE — 1124F ACP DISCUSS-NO DSCNMKR DOCD: CPT | Performed by: INTERNAL MEDICINE

## 2024-08-15 PROCEDURE — 4004F PT TOBACCO SCREEN RCVD TLK: CPT | Performed by: INTERNAL MEDICINE

## 2024-08-15 PROCEDURE — G8420 CALC BMI NORM PARAMETERS: HCPCS | Performed by: INTERNAL MEDICINE

## 2024-08-15 PROCEDURE — 3017F COLORECTAL CA SCREEN DOC REV: CPT | Performed by: INTERNAL MEDICINE

## 2024-08-15 ASSESSMENT — PATIENT HEALTH QUESTIONNAIRE - PHQ9
DEPRESSION UNABLE TO ASSESS: FUNCTIONAL CAPACITY MOTIVATION LIMITS ACCURACY
SUM OF ALL RESPONSES TO PHQ QUESTIONS 1-9: 0
SUM OF ALL RESPONSES TO PHQ QUESTIONS 1-9: 0
2. FEELING DOWN, DEPRESSED OR HOPELESS: NOT AT ALL
SUM OF ALL RESPONSES TO PHQ QUESTIONS 1-9: 0
SUM OF ALL RESPONSES TO PHQ9 QUESTIONS 1 & 2: 0
SUM OF ALL RESPONSES TO PHQ QUESTIONS 1-9: 0
1. LITTLE INTEREST OR PLEASURE IN DOING THINGS: NOT AT ALL

## 2024-08-16 LAB
A1AT SERPL-MCNC: 150 MG/DL (ref 101–187)
ALBUMIN SERPL-MCNC: 4.3 G/DL (ref 3.8–4.8)
ALP SERPL-CCNC: 78 IU/L (ref 44–121)
ALT SERPL-CCNC: 21 IU/L (ref 0–44)
AST SERPL-CCNC: 25 IU/L (ref 0–40)
BASOPHILS # BLD AUTO: 0 X10E3/UL (ref 0–0.2)
BASOPHILS NFR BLD AUTO: 1 %
BILIRUB DIRECT SERPL-MCNC: <0.1 MG/DL (ref 0–0.4)
BILIRUB SERPL-MCNC: <0.2 MG/DL (ref 0–1.2)
BUN SERPL-MCNC: 10 MG/DL (ref 8–27)
BUN/CREAT SERPL: 15 (ref 10–24)
CALCIUM SERPL-MCNC: 9.3 MG/DL (ref 8.6–10.2)
CHLORIDE SERPL-SCNC: 105 MMOL/L (ref 96–106)
CO2 SERPL-SCNC: 23 MMOL/L (ref 20–29)
CREAT SERPL-MCNC: 0.66 MG/DL (ref 0.76–1.27)
EGFRCR SERPLBLD CKD-EPI 2021: 100 ML/MIN/1.73
EOSINOPHIL # BLD AUTO: 0.2 X10E3/UL (ref 0–0.4)
EOSINOPHIL NFR BLD AUTO: 4 %
ERYTHROCYTE [DISTWIDTH] IN BLOOD BY AUTOMATED COUNT: 12.5 % (ref 11.6–15.4)
FERRITIN SERPL-MCNC: 17 NG/ML (ref 30–400)
GLUCOSE SERPL-MCNC: 119 MG/DL (ref 70–99)
HBV CORE AB SERPL QL IA: NEGATIVE
HBV SURFACE AB SER-ACNC: <3.5 MIU/ML
HBV SURFACE AG SERPL QL IA: NEGATIVE
HCT VFR BLD AUTO: 33.7 % (ref 37.5–51)
HCV AB SERPL QL IA: NORMAL
HCV IGG SERPL QL IA: NON REACTIVE
HGB BLD-MCNC: 11 G/DL (ref 13–17.7)
IMM GRANULOCYTES # BLD AUTO: 0 X10E3/UL (ref 0–0.1)
IMM GRANULOCYTES NFR BLD AUTO: 1 %
IRON SATN MFR SERPL: 11 % (ref 15–55)
IRON SERPL-MCNC: 57 UG/DL (ref 38–169)
LYMPHOCYTES # BLD AUTO: 2.2 X10E3/UL (ref 0.7–3.1)
LYMPHOCYTES NFR BLD AUTO: 35 %
MCH RBC QN AUTO: 29.3 PG (ref 26.6–33)
MCHC RBC AUTO-ENTMCNC: 32.6 G/DL (ref 31.5–35.7)
MCV RBC AUTO: 90 FL (ref 79–97)
MONOCYTES # BLD AUTO: 0.6 X10E3/UL (ref 0.1–0.9)
MONOCYTES NFR BLD AUTO: 10 %
NEUTROPHILS # BLD AUTO: 3 X10E3/UL (ref 1.4–7)
NEUTROPHILS NFR BLD AUTO: 49 %
PLATELET # BLD AUTO: 140 X10E3/UL (ref 150–450)
POTASSIUM SERPL-SCNC: 4.1 MMOL/L (ref 3.5–5.2)
PROT SERPL-MCNC: 7 G/DL (ref 6–8.5)
RBC # BLD AUTO: 3.75 X10E6/UL (ref 4.14–5.8)
SODIUM SERPL-SCNC: 140 MMOL/L (ref 134–144)
TIBC SERPL-MCNC: 511 UG/DL (ref 250–450)
UIBC SERPL-MCNC: 454 UG/DL (ref 111–343)
WBC # BLD AUTO: 6.1 X10E3/UL (ref 3.4–10.8)

## 2024-08-19 LAB
AFP L3 MFR SERPL: NORMAL % (ref 0–9.9)
AFP SERPL-MCNC: 4 NG/ML (ref 0–8.4)

## 2024-08-21 LAB
PETH BLD QL SCN: POSITIVE
PETH BLD-MCNC: 33 NG/ML

## 2024-09-09 PROBLEM — Z96.652 H/O TOTAL KNEE REPLACEMENT, LEFT: Status: ACTIVE | Noted: 2019-02-13

## 2024-09-09 PROBLEM — Z90.49 H/O PARTIAL RESECTION OF COLON: Status: ACTIVE | Noted: 2024-09-09

## 2024-09-09 PROBLEM — D69.6 THROMBOCYTOPENIA (HCC): Status: ACTIVE | Noted: 2024-09-09

## 2024-09-19 ENCOUNTER — HOSPITAL ENCOUNTER (OUTPATIENT)
Facility: HOSPITAL | Age: 69
Setting detail: SPECIMEN
Discharge: HOME | End: 2024-09-22
Payer: MEDICARE

## 2024-09-19 PROCEDURE — 88360 TUMOR IMMUNOHISTOCHEM/MANUAL: CPT

## 2024-10-07 ENCOUNTER — HOSPITAL ENCOUNTER (OUTPATIENT)
Facility: HOSPITAL | Age: 69
Discharge: HOME | End: 2024-10-10
Attending: INTERNAL MEDICINE
Payer: MEDICARE

## 2024-10-07 DIAGNOSIS — C18.9 COLON ADENOCARCINOMA (HCC): ICD-10-CM

## 2024-10-07 PROCEDURE — 74177 CT ABD & PELVIS W/CONTRAST: CPT

## 2024-10-07 PROCEDURE — 6360000004 HC RX CONTRAST MEDICATION: Performed by: INTERNAL MEDICINE

## 2024-10-07 RX ORDER — IOPAMIDOL 755 MG/ML
100 INJECTION, SOLUTION INTRAVASCULAR
Status: COMPLETED | OUTPATIENT
Start: 2024-10-07 | End: 2024-10-07

## 2024-10-07 RX ADMIN — IOPAMIDOL 100 ML: 755 INJECTION, SOLUTION INTRAVENOUS at 11:03

## 2024-10-15 ENCOUNTER — TELEPHONE (OUTPATIENT)
Age: 69
End: 2024-10-15

## 2024-10-15 NOTE — TELEPHONE ENCOUNTER
----- Message from Dr. Henrik Louis MD sent at 10/11/2024  5:14 PM EDT -----  Scans look good. Continue with the current plan.

## 2024-10-22 NOTE — PROGRESS NOTES
Dexter NIELSEN Juanarenetta is a 69 y.o. male here for follow up appt for colon cancer.  CT done 10/11/24   Had recent surgery for blockage in urinary tract. Taking Cipro now for infection.     1. Have you been to the ER, urgent care clinic since your last visit?  Hospitalized since your last visit? no    2. Have you seen or consulted any other health care providers outside of the [facility] since your last visit?  Include any pap smears or colon screening. Dr Carreno  - urologist

## 2024-10-23 ENCOUNTER — OFFICE VISIT (OUTPATIENT)
Age: 69
End: 2024-10-23
Payer: MEDICARE

## 2024-10-23 VITALS
SYSTOLIC BLOOD PRESSURE: 129 MMHG | BODY MASS INDEX: 25.2 KG/M2 | HEIGHT: 70 IN | WEIGHT: 176 LBS | OXYGEN SATURATION: 99 % | TEMPERATURE: 98 F | DIASTOLIC BLOOD PRESSURE: 55 MMHG | HEART RATE: 81 BPM

## 2024-10-23 DIAGNOSIS — Z85.038 HISTORY OF COLON CANCER: Primary | ICD-10-CM

## 2024-10-23 PROCEDURE — 99213 OFFICE O/P EST LOW 20 MIN: CPT | Performed by: INTERNAL MEDICINE

## 2024-10-23 RX ORDER — CIPROFLOXACIN 500 MG/1
TABLET, FILM COATED ORAL
COMMUNITY

## 2024-10-23 NOTE — PROGRESS NOTES
[facility] [place]  at [facility]  [address], [address], [address]  [place] [address]  [phone]      Oncology Note        Patient: Dexter Flaherty MRN: [id number]  SSN: [id number]    YOB: 1955  Age: 69 y.o.  Sex: male          Diagnosis:     1. Colon adenocarcinoma, proximal:   T3 N0 M0 (Stage II)        Subjective:      Dexter Flaherty is a 69 y.o. male who I am seeing for a new diagnosis of colon adenocarcinoma. He has a history of anemia. A colonoscopy in Feb showed a tumor in the proximal colon. A biopsy revealed a diagnosis of adenocarcinoma. He underwent a partial colectomy by Dr. Spear. He comes in to discuss the next steps.       Review of Systems:    Constitutional: negative  Eyes: negative  Ears, Nose, Mouth, Throat, and Face: negative  Respiratory: negative  Cardiovascular: negative  Gastrointestinal: negative  Genitourinary:negative  Integument/Breast: negative  Hematologic/Lymphatic: negative  Musculoskeletal:negative  Neurological: negative      Past Medical History:   Diagnosis Date    Alcohol abuse     Arthritis     hip    At risk for sleep apnea 02/2024    ZOILA 4. STOP BANG tool form faxed to PCP    Colon cancer (HCC) 03/2024    Glaucoma     History of colonic polyps     Hypertension     Lower abdominal pain     Melena     Vocal cord mass      Past Surgical History:   Procedure Laterality Date    COLECTOMY  03/11/2024    Laparoscopic right colectomy with intracorporeal anastomosis, robot assisted  performed by Beatris at [facility] MAIN    COLONOSCOPY N/A 10/24/2016    COLONOSCOPY performed by Raf Gastelum MD at [facility] ENDOSCOPY    COLONOSCOPY N/A 02/13/2024    COLONOSCOPY performed by Raf Gastelum MD at [facility] ENDOSCOPY    COLONOSCOPY,DIAGNOSTIC  10/24/2016         COLSC FLX W/RMVL OF TUMOR POLYP LESION SNARE TQ  4/19/2012         [facility]  4-13-15    LARYNGOSCOPY, BX and removal x 2    HERNIA REPAIR      2 sgys, 1 each side as

## 2024-11-26 NOTE — PROGRESS NOTES
[facility]      Jerod Valerio MD, FACP, FACG, FAASLD      PRASAD Montiel-C    Alicia Odom, [facility]   Isabelle Foxjjrafael, [facility]   Jenny Cassius, [facility]  Mihai King, [facility]   Linette Ochoa, [facility]   Kaley Nicola, [facility]   [address], [address]   [place]  [address] [phone]   FAX: [phone]  [facility]   [address], [address]   [place]  [address] [phone]   FAX: [phone]       Patient Care Team:  Mana Chris APRN - NP as PCP - General  Raf Gastelum MD (Gastroenterology)  Mark Spear MD (General Surgery)      Patient Active Problem List   Diagnosis    H/O total knee replacement, left    Colon cancer (HCC)    Ileus (HCC)    Hypertension    Hypercholesterolemia    BPH (benign prostatic hyperplasia)    Type 2 diabetes mellitus (HCC)    H/O bilateral inguinal hernia repair    H/O partial resection of colon    Thrombocytopenia (HCC)     Dexter NIELSEN Juanarenetta is being seen at [facility] for management of   alcohol induced liver disease.  The active problem list, all pertinent past medical history, medications, liver histology, radiologic findings and laboratory findings related to the liver disorder were reviewed and discussed with the patient.      The patient is a 69 y.o. male who was found to have chronic liver disease and cirrhosis in 3/2024 when he underwent a CT scan prior to undergoing partial colectomy for colon cancer.    He tolerated surgery without any evidence of hepatic decompensation.  He did not require post-operative chemotherapy or XRT.     The patient had been consuming 6-8 alcohol drinks every day since before 2014.    The patient now only has an occasional alcohol drink, 1-4 per month.    An assessment of liver fibrosis

## 2024-11-27 ENCOUNTER — OFFICE VISIT (OUTPATIENT)
Age: 69
End: 2024-11-27
Payer: MEDICARE

## 2024-11-27 VITALS
BODY MASS INDEX: 25.43 KG/M2 | HEIGHT: 70 IN | TEMPERATURE: 98.7 F | OXYGEN SATURATION: 98 % | HEART RATE: 77 BPM | DIASTOLIC BLOOD PRESSURE: 67 MMHG | WEIGHT: 177.6 LBS | SYSTOLIC BLOOD PRESSURE: 145 MMHG

## 2024-11-27 DIAGNOSIS — K70.9 LIVER DISEASE, CHRONIC, DUE TO ALCOHOL (HCC): Primary | ICD-10-CM

## 2024-11-27 PROCEDURE — 3017F COLORECTAL CA SCREEN DOC REV: CPT | Performed by: NURSE PRACTITIONER

## 2024-11-27 PROCEDURE — 1160F RVW MEDS BY RX/DR IN RCRD: CPT | Performed by: NURSE PRACTITIONER

## 2024-11-27 PROCEDURE — 1159F MED LIST DOCD IN RCRD: CPT | Performed by: NURSE PRACTITIONER

## 2024-11-27 PROCEDURE — 1126F AMNT PAIN NOTED NONE PRSNT: CPT | Performed by: NURSE PRACTITIONER

## 2024-11-27 PROCEDURE — G8419 CALC BMI OUT NRM PARAM NOF/U: HCPCS | Performed by: NURSE PRACTITIONER

## 2024-11-27 PROCEDURE — 3078F DIAST BP <80 MM HG: CPT | Performed by: NURSE PRACTITIONER

## 2024-11-27 PROCEDURE — 91200 LIVER ELASTOGRAPHY: CPT | Performed by: NURSE PRACTITIONER

## 2024-11-27 PROCEDURE — 1124F ACP DISCUSS-NO DSCNMKR DOCD: CPT | Performed by: NURSE PRACTITIONER

## 2024-11-27 PROCEDURE — 99214 OFFICE O/P EST MOD 30 MIN: CPT | Performed by: NURSE PRACTITIONER

## 2024-11-27 PROCEDURE — G8484 FLU IMMUNIZE NO ADMIN: HCPCS | Performed by: NURSE PRACTITIONER

## 2024-11-27 PROCEDURE — 3077F SYST BP >= 140 MM HG: CPT | Performed by: NURSE PRACTITIONER

## 2024-11-27 PROCEDURE — G8427 DOCREV CUR MEDS BY ELIG CLIN: HCPCS | Performed by: NURSE PRACTITIONER

## 2024-11-27 PROCEDURE — 4004F PT TOBACCO SCREEN RCVD TLK: CPT | Performed by: NURSE PRACTITIONER

## 2024-11-27 NOTE — PROGRESS NOTES
Chief Complaint   Patient presents with    Follow-up     Vitals:    11/27/24 0740   BP: (!) 145/67   Pulse: 77   Temp: 98.7 °F (37.1 °C)   SpO2: 98%     \"Have you been to the ER, urgent care clinic since your last visit?  Hospitalized since your last visit?\"    NO    “Have you seen or consulted any other health care providers outside our system since your last visit?”    NO    “Have you had a diabetic eye exam?”    NO     No diabetic eye exam on file

## 2025-01-16 DIAGNOSIS — Z85.038 HISTORY OF COLON CANCER: ICD-10-CM

## 2025-01-17 LAB
ALBUMIN SERPL-MCNC: 4.1 G/DL (ref 3.5–5)
ALBUMIN/GLOB SERPL: 1.2 (ref 1.1–2.2)
ALP SERPL-CCNC: 78 U/L (ref 45–117)
ALT SERPL-CCNC: 29 U/L (ref 12–78)
ANION GAP SERPL CALC-SCNC: 6 MMOL/L (ref 2–12)
AST SERPL-CCNC: 21 U/L (ref 15–37)
BASOPHILS # BLD: 0.05 K/UL (ref 0–0.1)
BASOPHILS NFR BLD: 0.8 % (ref 0–1)
BILIRUB SERPL-MCNC: 0.2 MG/DL (ref 0.2–1)
BUN SERPL-MCNC: 9 MG/DL (ref 6–20)
BUN/CREAT SERPL: 14 (ref 12–20)
CALCIUM SERPL-MCNC: 9.2 MG/DL (ref 8.5–10.1)
CEA SERPL-MCNC: 1.9 NG/ML
CHLORIDE SERPL-SCNC: 109 MMOL/L (ref 97–108)
CO2 SERPL-SCNC: 25 MMOL/L (ref 21–32)
CREAT SERPL-MCNC: 0.64 MG/DL (ref 0.7–1.3)
DIFFERENTIAL METHOD BLD: ABNORMAL
EOSINOPHIL # BLD: 0.2 K/UL (ref 0–0.4)
EOSINOPHIL NFR BLD: 3.2 % (ref 0–7)
ERYTHROCYTE [DISTWIDTH] IN BLOOD BY AUTOMATED COUNT: 13.8 % (ref 11.5–14.5)
GLOBULIN SER CALC-MCNC: 3.4 G/DL (ref 2–4)
GLUCOSE SERPL-MCNC: 122 MG/DL (ref 65–100)
HCT VFR BLD AUTO: 32.8 % (ref 36.6–50.3)
HGB BLD-MCNC: 10 G/DL (ref 12.1–17)
IMM GRANULOCYTES # BLD AUTO: 0.02 K/UL (ref 0–0.04)
IMM GRANULOCYTES NFR BLD AUTO: 0.3 % (ref 0–0.5)
LYMPHOCYTES # BLD: 2.19 K/UL (ref 0.8–3.5)
LYMPHOCYTES NFR BLD: 35 % (ref 12–49)
MCH RBC QN AUTO: 26 PG (ref 26–34)
MCHC RBC AUTO-ENTMCNC: 30.5 G/DL (ref 30–36.5)
MCV RBC AUTO: 85.4 FL (ref 80–99)
MONOCYTES # BLD: 0.51 K/UL (ref 0–1)
MONOCYTES NFR BLD: 8.2 % (ref 5–13)
NEUTS SEG # BLD: 3.28 K/UL (ref 1.8–8)
NEUTS SEG NFR BLD: 52.5 % (ref 32–75)
NRBC # BLD: 0 K/UL (ref 0–0.01)
NRBC BLD-RTO: 0 PER 100 WBC
PLATELET # BLD AUTO: 166 K/UL (ref 150–400)
PMV BLD AUTO: 11.1 FL (ref 8.9–12.9)
POTASSIUM SERPL-SCNC: 4.3 MMOL/L (ref 3.5–5.1)
PROT SERPL-MCNC: 7.5 G/DL (ref 6.4–8.2)
RBC # BLD AUTO: 3.84 M/UL (ref 4.1–5.7)
SODIUM SERPL-SCNC: 140 MMOL/L (ref 136–145)
WBC # BLD AUTO: 6.3 K/UL (ref 4.1–11.1)

## 2025-01-30 ENCOUNTER — ANESTHESIA EVENT (OUTPATIENT)
Facility: HOSPITAL | Age: 70
End: 2025-01-30
Payer: MEDICARE

## 2025-01-30 ENCOUNTER — HOSPITAL ENCOUNTER (OUTPATIENT)
Facility: HOSPITAL | Age: 70
Setting detail: OUTPATIENT SURGERY
Discharge: HOME | End: 2025-01-30
Attending: INTERNAL MEDICINE | Admitting: INTERNAL MEDICINE
Payer: MEDICARE

## 2025-01-30 ENCOUNTER — ANESTHESIA (OUTPATIENT)
Facility: HOSPITAL | Age: 70
End: 2025-01-30
Payer: MEDICARE

## 2025-01-30 VITALS
SYSTOLIC BLOOD PRESSURE: 126 MMHG | HEIGHT: 70 IN | TEMPERATURE: 97.2 F | RESPIRATION RATE: 12 BRPM | OXYGEN SATURATION: 98 % | HEART RATE: 69 BPM | DIASTOLIC BLOOD PRESSURE: 38 MMHG | WEIGHT: 174.6 LBS | BODY MASS INDEX: 25 KG/M2

## 2025-01-30 PROCEDURE — 3600007502: Performed by: INTERNAL MEDICINE

## 2025-01-30 PROCEDURE — 3700000001 HC ADD 15 MINUTES (ANESTHESIA): Performed by: INTERNAL MEDICINE

## 2025-01-30 PROCEDURE — 2709999900 HC NON-CHARGEABLE SUPPLY: Performed by: INTERNAL MEDICINE

## 2025-01-30 PROCEDURE — 7100000011 HC PHASE II RECOVERY - ADDTL 15 MIN: Performed by: INTERNAL MEDICINE

## 2025-01-30 PROCEDURE — 6360000002 HC RX W HCPCS: Performed by: REGISTERED NURSE

## 2025-01-30 PROCEDURE — 2580000003 HC RX 258: Performed by: INTERNAL MEDICINE

## 2025-01-30 PROCEDURE — 3600007512: Performed by: INTERNAL MEDICINE

## 2025-01-30 PROCEDURE — 3700000000 HC ANESTHESIA ATTENDED CARE: Performed by: INTERNAL MEDICINE

## 2025-01-30 PROCEDURE — 88305 TISSUE EXAM BY PATHOLOGIST: CPT

## 2025-01-30 PROCEDURE — 7100000010 HC PHASE II RECOVERY - FIRST 15 MIN: Performed by: INTERNAL MEDICINE

## 2025-01-30 RX ORDER — SODIUM CHLORIDE 0.9 % (FLUSH) 0.9 %
5-40 SYRINGE (ML) INJECTION PRN
Status: DISCONTINUED | OUTPATIENT
Start: 2025-01-30 | End: 2025-01-30 | Stop reason: HOSPADM

## 2025-01-30 RX ORDER — SODIUM CHLORIDE 0.9 % (FLUSH) 0.9 %
5-40 SYRINGE (ML) INJECTION EVERY 12 HOURS SCHEDULED
Status: DISCONTINUED | OUTPATIENT
Start: 2025-01-30 | End: 2025-01-30 | Stop reason: HOSPADM

## 2025-01-30 RX ORDER — SODIUM CHLORIDE 9 MG/ML
INJECTION, SOLUTION INTRAVENOUS PRN
Status: DISCONTINUED | OUTPATIENT
Start: 2025-01-30 | End: 2025-01-30 | Stop reason: HOSPADM

## 2025-01-30 RX ADMIN — SODIUM CHLORIDE: 9 INJECTION, SOLUTION INTRAVENOUS at 07:41

## 2025-01-30 RX ADMIN — LIDOCAINE HYDROCHLORIDE 50 MG: 20 INJECTION, SOLUTION EPIDURAL; INFILTRATION; INTRACAUDAL; PERINEURAL at 07:43

## 2025-01-30 RX ADMIN — PROPOFOL 80 MG: 10 INJECTION, EMULSION INTRAVENOUS at 07:43

## 2025-01-30 RX ADMIN — PROPOFOL 270 MG: 10 INJECTION, EMULSION INTRAVENOUS at 08:06

## 2025-01-30 ASSESSMENT — PAIN - FUNCTIONAL ASSESSMENT: PAIN_FUNCTIONAL_ASSESSMENT: NONE - DENIES PAIN

## 2025-01-30 NOTE — ANESTHESIA PRE PROCEDURE
Mark SCHNEIDER MD at [facility] MAIN OR   • TONSILLECTOMY         Social History:    Social History     Tobacco Use   • Smoking status: Every Day     Current packs/day: 0.50     Average packs/day: 0.5 packs/day for 54.1 years (27.0 ttl pk-yrs)     Types: Cigarettes     Start date: 1971   • Smokeless tobacco: Never   • Tobacco comments:     Stopped smoking for about 5 months in 2019.   Substance Use Topics   • Alcohol use: Not Currently                                Ready to quit: Not Answered  Counseling given: Not Answered  Tobacco comments: Stopped smoking for about 5 months in 2019.      Vital Signs (Current): There were no vitals filed for this visit.                                           BP Readings from Last 3 Encounters:   11/27/24 (!) 145/67   10/23/24 (!) 129/55   08/15/24 (!) 122/55       NPO Status:                                                                                 BMI:   Wt Readings from Last 3 Encounters:   11/27/24 80.6 kg (177 lb 9.6 oz)   10/23/24 79.8 kg (176 lb)   08/15/24 76.3 kg (168 lb 3.2 oz)     There is no height or weight on file to calculate BMI.    CBC:   Lab Results   Component Value Date/Time    WBC 6.3 01/16/2025 10:20 AM    RBC 3.84 01/16/2025 10:20 AM    HGB 10.0 01/16/2025 10:20 AM    HCT 32.8 01/16/2025 10:20 AM    MCV 85.4 01/16/2025 10:20 AM    RDW 13.8 01/16/2025 10:20 AM     01/16/2025 10:20 AM       CMP:   Lab Results   Component Value Date/Time     01/16/2025 10:20 AM    K 4.3 01/16/2025 10:20 AM     01/16/2025 10:20 AM    CO2 25 01/16/2025 10:20 AM    BUN 9 01/16/2025 10:20 AM    CREATININE 0.64 01/16/2025 10:20 AM    LABGLOM >90 01/16/2025 10:20 AM    LABGLOM >90 04/10/2024 09:38 AM    GLUCOSE 122 01/16/2025 10:20 AM    GLUCOSE 119 08/15/2024 01:47 PM    CALCIUM 9.2 01/16/2025 10:20 AM    BILITOT 0.2 01/16/2025 10:20 AM    ALKPHOS 78 01/16/2025 10:20 AM    ALKPHOS 78 08/15/2024 01:47 PM    AST 21 01/16/2025 10:20 AM    ALT 29 01/16/2025 10:20 AM

## 2025-01-30 NOTE — PROGRESS NOTES
Endoscopy Case End Note:     Procedure scope was pre-cleaned, per protocol, at bedside by BERTA Looney.       Report received from anesthesia.  See anesthesia flowsheet for intra-procedure vital signs and events.    Belongings remain under stretcher with patient.

## 2025-01-30 NOTE — DISCHARGE INSTRUCTIONS
Dexter Ariasaylamagda  [id number]  1955    COLON DISCHARGE INSTRUCTIONS  Discomfort:  Redness at IV site- apply warm compress to area; if redness or soreness persist- contact your physician  There may be a slight amount of blood passed from the rectum  Gaseous discomfort- walking, belching will help relieve any discomfort  You may not operate a vehicle for 12 hours  You may not engage in an occupation involving machinery or appliances for rest of today  You may not drink alcoholic beverages for at least 12 hours  Avoid making any critical decisions for at least 24 hour  DIET:   High fiber diet.   - however -  remember your colon is empty and a heavy meal will produce gas.   Avoid these foods:  vegetables, fried / greasy foods, carbonated drinks for today  MEDICATION:  [unfilled]     ACTIVITY:  You may not resume your normal daily activities until tomorrow AM; it is recommended that you spend the remainder of the day resting -  avoid any strenuous activity.  CALL M.D.  ANY SIGN OF:   Increasing pain, nausea, vomiting  Abdominal distension (swelling)  New increased bleeding (oral or rectal)  Fever (chills)  Pain in chest area  Bloody discharge from nose or mouth  Shortness of breath    IMPRESSION:  -Anatomy consistent with right hemicolectomy  -Normal terminal ileum  -Normal ileocolonic anastomosis  -Single diminutive, benign-appearing sessile 2mm polyp in the sigmoid colon at 55cm; removed with cold snare  -Sigmoid diverticulosis  -Small grade 1 internal hemorrhoids    Follow-up Instructions:   Call Dr. Gastelum for the results of procedure / biopsy in 7-10 days  Telephone # [phone]  Await pathology.,   If adenoma is present, repeat colonoscopy in 1 years; otherwise, repeat colonoscopy in 3 years.    Raf Gastelum MD    Patient Education on Sedation / Analgesia Administered for Procedure      For 24 hours after general anesthesia or intravenous analgesia / sedation:  Have someone responsible help you with your

## 2025-01-30 NOTE — OP NOTE
NAME:  Dexter Flaherty   :   1955   MRN:   [id number]     Date/Time:  2025 8:10 AM    Colonoscopy Operative Report    Procedure Type:   Colonoscopy with polypectomy (cold snare)     Indications:     Personal history of colon cancer (screening only), Personal history of colon polyps (screening only)  Pre-operative Diagnosis: see indication above  Post-operative Diagnosis:  See findings below  :  Raf Gastelum MD  Referring Provider: -Mark peterson MD-Mana Chris APRN - NP; MD Nnamdi    Exam:  Airway: clear, no airway problems anticipated  Heart: RRR, without gallops or rubs  Lungs: clear bilaterally without wheezes, crackles, or rhonchi  Abdomen: soft, nontender, nondistended, bowel sounds present  Mental Status: awake, alert and oriented to person, place and time    Sedation:  MAC anesthesia Propofol 350mg IV  Procedure Details:  After informed consent was obtained with all risks and benefits of procedure explained and preoperative exam completed, the patient was taken to the endoscopy suite and placed in the left lateral decubitus position.  Upon sequential sedation as per above, a digital rectal exam was performed demonstrating internal hemorrhoids.  The Olympus videocolonoscope  was inserted in the rectum and carefully advanced to the terminal ileum.   The quality of preparation was adequate.  The colonoscope was slowly withdrawn with careful evaluation between folds. Retroflexion in the rectum was completed demonstrating internal hemorrhoids.     Findings:     -Anatomy consistent with right hemicolectomy  -Normal terminal ileum  -Normal ileocolonic anastomosis  -Single diminutive, benign-appearing sessile 2mm polyp in the sigmoid colon at 55cm; removed with cold snare  -Sigmoid diverticulosis  -Small grade 1 internal hemorrhoids    Specimen Removed:  #1 sigmoid polyp  Complications: None.   EBL:  None.    Impression:    -Anatomy consistent with right hemicolectomy  -Normal

## 2025-01-30 NOTE — PROGRESS NOTES
ARRIVAL INFORMATION:  Verified patient name and date of birth, scheduled procedure, and informed consent.     : Fannie (wife) contact number: [phone]  Physician and staff can share information with the .     Receive texts: Yes    Belongings with patient include:  Clothing,None    GI FOCUSED ASSESSMENT:  Neuro: Awake, alert, oriented x4  Respiratory: even and unlabored   GI: soft and non-distended  EKG Rhythm: normal sinus rhythm    Education:Reviewed general discharge instructions and  information.

## 2025-01-30 NOTE — H&P
Gastroenterology Outpatient History and Physical    Patient: Dexter NIELSEN Reynold    Physician: Raf Gastelum MD    Chief Complaint: pers hx CRC  History of Present Illness: 70yo M with pers hx CRC, resxn as  r hemicolectomy 2024.      History:  Past Medical History:   Diagnosis Date    Alcohol abuse     Arthritis     hip    At risk for sleep apnea 02/2024    ZOILA 4. STOP BANG tool form faxed to PCP    Colon cancer (HCC) 03/2024    Glaucoma     History of colonic polyps     Hypertension     Lower abdominal pain     Melena     Vocal cord mass       Past Surgical History:   Procedure Laterality Date    COLECTOMY  03/11/2024    Laparoscopic right colectomy with intracorporeal anastomosis, robot assisted  performed by Beatris at [facility] MAIN    COLONOSCOPY N/A 10/24/2016    COLONOSCOPY performed by aRf Gastelum MD at [facility] ENDOSCOPY    COLONOSCOPY N/A 02/13/2024    COLONOSCOPY performed by Raf Gastelum MD at [facility] ENDOSCOPY    COLONOSCOPY,DIAGNOSTIC  10/24/2016         COLSC FLX W/RMVL OF TUMOR POLYP LESION SNARE TQ  4/19/2012         [facility]  4-13-15    LARYNGOSCOPY, BX and removal x 2    HERNIA REPAIR      2 sgys, 1 each side as infant and teenager    JOINT REPLACEMENT Left 02/13/2019    Hip    OTHER SURGICAL HISTORY      colonoscopy/polyp    SMALL INTESTINE SURGERY Right 3/11/2024    ROBOT ASSISTED LAPAROSCOPIC RIGHT COLECTOMY performed by Mark Spear MD at [facility] MAIN OR    TONSILLECTOMY        Social History     Socioeconomic History    Marital status:      Spouse name: None    Number of children: None    Years of education: None    Highest education level: None   Tobacco Use    Smoking status: Every Day     Current packs/day: 0.50     Average packs/day: 0.5 packs/day for 54.1 years (27.0 ttl pk-yrs)     Types: Cigarettes     Start date: 1971    Smokeless tobacco: Never    Tobacco comments:     Stopped smoking for about 5 months in 2019.   Vaping Use    Vaping status: Never Used   Substance and Sexual Activity    Alcohol use:

## 2025-01-30 NOTE — ANESTHESIA POSTPROCEDURE EVALUATION
Department of Anesthesiology  Postprocedure Note    Patient: Dexter Flaherty  MRN: [id number]  YOB: 1955  Date of evaluation: 1/30/2025    Procedure Summary       Date: 01/30/25 Room / Location: [facility] ENDO 02 / [facility] ENDOSCOPY    Anesthesia Start: 0741 Anesthesia Stop: 0808    Procedure: COLONOSCOPY (Lower GI Region) Diagnosis:       Personal history of colon polyps, unspecified      Personal history of colon cancer      Benign neoplasm of sigmoid colon      Diverticulosis of colon (without mention of hemorrhage)      First degree hemorrhoids      (Personal history of colon polyps, unspecified [Z86.0100])    Surgeons: Raf Gastelum MD Responsible Provider: Criss Barraza MD    Anesthesia Type: MAC ASA Status: 2            Anesthesia Type: MAC    Serena Phase I: Serena Score: 10    Serena Phase II: Sreena Score: 10    Anesthesia Post Evaluation    Patient location during evaluation: bedside  Patient participation: complete - patient participated  Level of consciousness: awake and alert  Pain scale: Controlled per protocol.  Airway patency: patent  Nausea & Vomiting: no nausea and no vomiting  Cardiovascular status: hemodynamically stable  Respiratory status: acceptable  Hydration status: stable  Multimodal analgesia pain management approach  Pain management: adequate    No notable events documented.

## 2025-04-16 ENCOUNTER — HOSPITAL ENCOUNTER (OUTPATIENT)
Facility: HOSPITAL | Age: 70
Discharge: HOME | End: 2025-04-19
Attending: INTERNAL MEDICINE
Payer: MEDICARE

## 2025-04-16 DIAGNOSIS — Z85.038 HISTORY OF COLON CANCER: ICD-10-CM

## 2025-04-16 PROCEDURE — 74177 CT ABD & PELVIS W/CONTRAST: CPT

## 2025-04-16 PROCEDURE — 6360000004 HC RX CONTRAST MEDICATION: Performed by: INTERNAL MEDICINE

## 2025-04-16 RX ORDER — IOPAMIDOL 755 MG/ML
100 INJECTION, SOLUTION INTRAVASCULAR
Status: COMPLETED | OUTPATIENT
Start: 2025-04-16 | End: 2025-04-16

## 2025-04-16 RX ADMIN — IOPAMIDOL 100 ML: 755 INJECTION, SOLUTION INTRAVENOUS at 08:54

## 2025-04-21 ENCOUNTER — RESULTS FOLLOW-UP (OUTPATIENT)
Age: 70
End: 2025-04-21

## 2025-04-23 ENCOUNTER — OFFICE VISIT (OUTPATIENT)
Age: 70
End: 2025-04-23
Payer: MEDICARE

## 2025-04-23 VITALS
DIASTOLIC BLOOD PRESSURE: 58 MMHG | HEART RATE: 67 BPM | HEIGHT: 70 IN | BODY MASS INDEX: 25.2 KG/M2 | SYSTOLIC BLOOD PRESSURE: 122 MMHG | TEMPERATURE: 98.1 F | WEIGHT: 176 LBS | OXYGEN SATURATION: 97 %

## 2025-04-23 DIAGNOSIS — Z85.038 HISTORY OF COLON CANCER: Primary | ICD-10-CM

## 2025-04-23 PROCEDURE — 3074F SYST BP LT 130 MM HG: CPT | Performed by: INTERNAL MEDICINE

## 2025-04-23 PROCEDURE — 1126F AMNT PAIN NOTED NONE PRSNT: CPT | Performed by: INTERNAL MEDICINE

## 2025-04-23 PROCEDURE — 3078F DIAST BP <80 MM HG: CPT | Performed by: INTERNAL MEDICINE

## 2025-04-23 PROCEDURE — 3017F COLORECTAL CA SCREEN DOC REV: CPT | Performed by: INTERNAL MEDICINE

## 2025-04-23 PROCEDURE — 1124F ACP DISCUSS-NO DSCNMKR DOCD: CPT | Performed by: INTERNAL MEDICINE

## 2025-04-23 PROCEDURE — G8428 CUR MEDS NOT DOCUMENT: HCPCS | Performed by: INTERNAL MEDICINE

## 2025-04-23 PROCEDURE — 4004F PT TOBACCO SCREEN RCVD TLK: CPT | Performed by: INTERNAL MEDICINE

## 2025-04-23 PROCEDURE — 99213 OFFICE O/P EST LOW 20 MIN: CPT | Performed by: INTERNAL MEDICINE

## 2025-04-23 PROCEDURE — G8419 CALC BMI OUT NRM PARAM NOF/U: HCPCS | Performed by: INTERNAL MEDICINE

## 2025-04-23 NOTE — PROGRESS NOTES
Dexter Flaherty is a 69 y.o. male here for 6 month follow up appt for colon cancer.  CT done 4/16/25 1/30/25 colonoscopy  Pt doing well. No concerns brought up.   Medication reviewed.     1. Have you been to the ER, urgent care clinic since your last visit?  Hospitalized since your last visit? no    2. Have you seen or consulted any other health care providers outside of the [facility] since your last visit?  Include any pap smears or colon screening.  Dr Funes  
CONTRAST 04/16/2025    Narrative  EXAM: CT ABDOMEN PELVIS W IV CONTRAST    CLINICAL HISTORY: Personal history of other malignant neoplasm of large  intestine  INDICATION: Personal history of other malignant neoplasm of large intestine  COMPARISON: [10/7/2024].  CONTRAST: [100] ml Isovue 370  TECHNIQUE:  Multislice helical CT was performed of the abdomen and pelvis  following  uneventful rapid bolus intravenous contrast administration.  Oral contrast was  not administered. Contiguous 5 mm axial images were reconstructed and lung and  soft tissue windows were generated. Coronal and sagittal reformations were  generated.  CT dose reduction was achieved through use of a standardized  protocol tailored for this examination and automatic exposure control for dose  modulation.    FINDINGS:  LUNG BASES: Coronary vascular calcifications. No nodule.  LIVER/GALLBLADDER: Hepatic steatosis and nodular hepatic contour suggesting  cirrhotic change. There is no intrahepatic duct dilatation. There is no hepatic  parenchymal mass. Hepatic enhancement pattern is within normal limits. Portal  vein is patent.  SPLEEN/PANCREAS: No evidence of splenomegaly.  There is no pancreatic duct  dilatation.  ADRENALS/KIDNEYS: Right renal cyst. Renal cortical thinning bilaterally. There  is no hydronephrosis. There is no renal mass. There is no perinephric mass.  STOMACH: [No dilatation or wall thickening.  COLON AND SMALL BOWEL: Right hemicolectomy. There is no free intraperitoneal  air. There is no evidence of incarceration or obstruction. No mesenteric  adenopathy.  PERITONEUM: No ascites or lymphadenopathy.]  APPENDIX: Not visualized.]  BLADDER/REPRODUCTIVE ORGANS: No mass or calculus.    RETROPERITONEUM: The abdominal aorta is stable in size. No retroperitoneal  adenopathy.    OSSEOUS STRUCTURES: No destructive bone lesion or acute fracture.    Impression  No acute intraperitoneal process is identified.  No evidence of recurrent/metastatic

## 2025-05-28 ENCOUNTER — OFFICE VISIT (OUTPATIENT)
Age: 70
End: 2025-05-28
Payer: MEDICARE

## 2025-05-28 VITALS
SYSTOLIC BLOOD PRESSURE: 128 MMHG | HEART RATE: 74 BPM | TEMPERATURE: 98.3 F | OXYGEN SATURATION: 97 % | DIASTOLIC BLOOD PRESSURE: 46 MMHG | HEIGHT: 70 IN | BODY MASS INDEX: 25.71 KG/M2 | WEIGHT: 179.6 LBS

## 2025-05-28 DIAGNOSIS — K76.0 HEPATIC STEATOSIS: Primary | ICD-10-CM

## 2025-05-28 DIAGNOSIS — R93.5 ABNORMAL FINDINGS ON DIAGNOSTIC IMAGING OF ABDOMEN: ICD-10-CM

## 2025-05-28 LAB
ALBUMIN SERPL-MCNC: 3.8 G/DL (ref 3.5–5)
ALBUMIN/GLOB SERPL: 1.3 (ref 1.1–2.2)
ALP SERPL-CCNC: 83 U/L (ref 45–117)
ALT SERPL-CCNC: 36 U/L (ref 12–78)
ANION GAP SERPL CALC-SCNC: 3 MMOL/L (ref 2–12)
AST SERPL-CCNC: 28 U/L (ref 15–37)
BASOPHILS # BLD: 0.05 K/UL (ref 0–0.1)
BASOPHILS NFR BLD: 0.9 % (ref 0–1)
BILIRUB DIRECT SERPL-MCNC: <0.1 MG/DL (ref 0–0.2)
BILIRUB SERPL-MCNC: 0.2 MG/DL (ref 0.2–1)
BUN SERPL-MCNC: 11 MG/DL (ref 6–20)
BUN/CREAT SERPL: 14 (ref 12–20)
CALCIUM SERPL-MCNC: 9.2 MG/DL (ref 8.5–10.1)
CHLORIDE SERPL-SCNC: 109 MMOL/L (ref 97–108)
CO2 SERPL-SCNC: 28 MMOL/L (ref 21–32)
CREAT SERPL-MCNC: 0.81 MG/DL (ref 0.7–1.3)
DIFFERENTIAL METHOD BLD: ABNORMAL
EOSINOPHIL # BLD: 0.18 K/UL (ref 0–0.4)
EOSINOPHIL NFR BLD: 3.3 % (ref 0–7)
ERYTHROCYTE [DISTWIDTH] IN BLOOD BY AUTOMATED COUNT: 15.8 % (ref 11.5–14.5)
FERRITIN SERPL-MCNC: 9 NG/ML (ref 26–388)
GLOBULIN SER CALC-MCNC: 3 G/DL (ref 2–4)
GLUCOSE SERPL-MCNC: 213 MG/DL (ref 65–100)
HCT VFR BLD AUTO: 34.6 % (ref 36.6–50.3)
HGB BLD-MCNC: 11.1 G/DL (ref 12.1–17)
IMM GRANULOCYTES # BLD AUTO: 0.01 K/UL (ref 0–0.04)
IMM GRANULOCYTES NFR BLD AUTO: 0.2 % (ref 0–0.5)
INR PPP: 1 (ref 0.9–1.1)
IRON SATN MFR SERPL: 8 % (ref 20–50)
IRON SERPL-MCNC: 42 UG/DL (ref 35–150)
LYMPHOCYTES # BLD: 1.77 K/UL (ref 0.8–3.5)
LYMPHOCYTES NFR BLD: 32.7 % (ref 12–49)
MCH RBC QN AUTO: 28.5 PG (ref 26–34)
MCHC RBC AUTO-ENTMCNC: 32.1 G/DL (ref 30–36.5)
MCV RBC AUTO: 88.7 FL (ref 80–99)
MONOCYTES # BLD: 0.5 K/UL (ref 0–1)
MONOCYTES NFR BLD: 9.2 % (ref 5–13)
NEUTS SEG # BLD: 2.91 K/UL (ref 1.8–8)
NEUTS SEG NFR BLD: 53.7 % (ref 32–75)
NRBC # BLD: 0 K/UL (ref 0–0.01)
NRBC BLD-RTO: 0 PER 100 WBC
PLATELET # BLD AUTO: 144 K/UL (ref 150–400)
PMV BLD AUTO: 11.8 FL (ref 8.9–12.9)
POTASSIUM SERPL-SCNC: 3.8 MMOL/L (ref 3.5–5.1)
PROT SERPL-MCNC: 6.8 G/DL (ref 6.4–8.2)
PROTHROMBIN TIME: 10.8 SEC (ref 9.2–11.2)
RBC # BLD AUTO: 3.9 M/UL (ref 4.1–5.7)
SODIUM SERPL-SCNC: 140 MMOL/L (ref 136–145)
TIBC SERPL-MCNC: 495 UG/DL (ref 250–450)
WBC # BLD AUTO: 5.4 K/UL (ref 4.1–11.1)

## 2025-05-28 PROCEDURE — 99214 OFFICE O/P EST MOD 30 MIN: CPT | Performed by: NURSE PRACTITIONER

## 2025-05-28 PROCEDURE — 3078F DIAST BP <80 MM HG: CPT | Performed by: NURSE PRACTITIONER

## 2025-05-28 PROCEDURE — 1124F ACP DISCUSS-NO DSCNMKR DOCD: CPT | Performed by: NURSE PRACTITIONER

## 2025-05-28 PROCEDURE — 3017F COLORECTAL CA SCREEN DOC REV: CPT | Performed by: NURSE PRACTITIONER

## 2025-05-28 PROCEDURE — 4004F PT TOBACCO SCREEN RCVD TLK: CPT | Performed by: NURSE PRACTITIONER

## 2025-05-28 PROCEDURE — 3074F SYST BP LT 130 MM HG: CPT | Performed by: NURSE PRACTITIONER

## 2025-05-28 PROCEDURE — G8427 DOCREV CUR MEDS BY ELIG CLIN: HCPCS | Performed by: NURSE PRACTITIONER

## 2025-05-28 PROCEDURE — 91200 LIVER ELASTOGRAPHY: CPT | Performed by: NURSE PRACTITIONER

## 2025-05-28 PROCEDURE — 1159F MED LIST DOCD IN RCRD: CPT | Performed by: NURSE PRACTITIONER

## 2025-05-28 PROCEDURE — G8419 CALC BMI OUT NRM PARAM NOF/U: HCPCS | Performed by: NURSE PRACTITIONER

## 2025-05-28 ASSESSMENT — PATIENT HEALTH QUESTIONNAIRE - PHQ9
DEPRESSION UNABLE TO ASSESS: FUNCTIONAL CAPACITY MOTIVATION LIMITS ACCURACY
SUM OF ALL RESPONSES TO PHQ QUESTIONS 1-9: 0
SUM OF ALL RESPONSES TO PHQ QUESTIONS 1-9: 0
2. FEELING DOWN, DEPRESSED OR HOPELESS: NOT AT ALL
SUM OF ALL RESPONSES TO PHQ QUESTIONS 1-9: 0
SUM OF ALL RESPONSES TO PHQ QUESTIONS 1-9: 0
1. LITTLE INTEREST OR PLEASURE IN DOING THINGS: NOT AT ALL

## 2025-05-28 NOTE — PROGRESS NOTES
Chief Complaint   Patient presents with    Follow-up     fibroscan     Vitals:    05/28/25 0852   BP: (!) 128/46   BP Site: Left Upper Arm   Patient Position: Sitting   Pulse: 74   Temp: 98.3 °F (36.8 °C)   TempSrc: Temporal   SpO2: 97%   Weight: 81.5 kg (179 lb 9.6 oz)   Height: 1.778 m (5' 10\")     .  \"Have you been to the ER, urgent care clinic since your last visit?  Hospitalized since your last visit?\"    NO    “Have you seen or consulted any other health care providers outside of [facility] since your last visit?”    NO          Click Here for Release of Records Request

## 2025-05-29 ENCOUNTER — RESULTS FOLLOW-UP (OUTPATIENT)
Age: 70
End: 2025-05-29

## 2025-05-29 DIAGNOSIS — D50.9 IRON DEFICIENCY ANEMIA, UNSPECIFIED IRON DEFICIENCY ANEMIA TYPE: Primary | ICD-10-CM

## 2025-06-02 ENCOUNTER — TELEPHONE (OUTPATIENT)
Age: 70
End: 2025-06-02

## 2025-06-02 RX ORDER — PNV NO.95/FERROUS FUM/FOLIC AC 28MG-0.8MG
65 TABLET ORAL EVERY EVENING
Qty: 30 TABLET | Refills: 5 | Status: SHIPPED | OUTPATIENT
Start: 2025-06-02

## 2025-06-02 NOTE — TELEPHONE ENCOUNTER
Patient called to follow up on you possibly putting in an iron rx based on you review of his lab results.

## 2025-06-06 LAB
AFP L3 MFR SERPL: NORMAL % (ref 0–9.9)
AFP SERPL-MCNC: 4 NG/ML (ref 0–8.4)

## 2025-08-28 LAB
BASOPHILS # BLD AUTO: 0 X10E3/UL (ref 0–0.2)
BASOPHILS NFR BLD AUTO: 0 %
EOSINOPHIL # BLD AUTO: 0.3 X10E3/UL (ref 0–0.4)
EOSINOPHIL NFR BLD AUTO: 4 %
ERYTHROCYTE [DISTWIDTH] IN BLOOD BY AUTOMATED COUNT: 14.7 % (ref 11.6–15.4)
FERRITIN SERPL-MCNC: 70 NG/ML (ref 30–400)
HCT VFR BLD AUTO: 42.4 % (ref 37.5–51)
HGB BLD-MCNC: 14.1 G/DL (ref 13–17.7)
IMM GRANULOCYTES # BLD AUTO: 0 X10E3/UL (ref 0–0.1)
IMM GRANULOCYTES NFR BLD AUTO: 0 %
IRON SATN MFR SERPL: 24 % (ref 15–55)
IRON SERPL-MCNC: 92 UG/DL (ref 38–169)
LYMPHOCYTES # BLD AUTO: 2 X10E3/UL (ref 0.7–3.1)
LYMPHOCYTES NFR BLD AUTO: 30 %
MCH RBC QN AUTO: 32.4 PG (ref 26.6–33)
MCHC RBC AUTO-ENTMCNC: 33.3 G/DL (ref 31.5–35.7)
MCV RBC AUTO: 98 FL (ref 79–97)
MONOCYTES # BLD AUTO: 0.6 X10E3/UL (ref 0.1–0.9)
MONOCYTES NFR BLD AUTO: 9 %
NEUTROPHILS # BLD AUTO: 3.8 X10E3/UL (ref 1.4–7)
NEUTROPHILS NFR BLD AUTO: 57 %
PLATELET # BLD AUTO: 149 X10E3/UL (ref 150–450)
RBC # BLD AUTO: 4.35 X10E6/UL (ref 4.14–5.8)
TIBC SERPL-MCNC: 384 UG/DL (ref 250–450)
UIBC SERPL-MCNC: 292 UG/DL (ref 111–343)
WBC # BLD AUTO: 6.7 X10E3/UL (ref 3.4–10.8)

## (undated) DEVICE — SEAL

## (undated) DEVICE — STERILE POLYISOPRENE POWDER-FREE SURGICAL GLOVES WITH EMOLLIENT COATING: Brand: PROTEXIS

## (undated) DEVICE — SNARE ENDOSCP L240CM LOOP W27MM SHTH DIA2.4MM WRK CHN 2.8MM

## (undated) DEVICE — WATERPROOF, BACTERIA PROOF DRESSING WITH ABSORBENT SEE THROUGH PAD: Brand: OPSITE POST-OP VISIBLE 20X10CM CTN 20

## (undated) DEVICE — Z CONVERTED USE 2107985 COVER FLROSCP W36XL28IN 4 SIDE ADH

## (undated) DEVICE — SUTURE STRATAFIX SPRL PDS + VLT 3-0 15CM DISPOSABLE/SNGLE SXPP1B420

## (undated) DEVICE — SEALANT FIBRIN 10 CC FRZN PRE FILLED SYR TISSEEL

## (undated) DEVICE — SOLUTION IV 1000ML 0.9% SOD CHL

## (undated) DEVICE — GOWN,SIRUS,NONRNF,SETINSLV,XL,20/CS: Brand: MEDLINE

## (undated) DEVICE — INFECTION CONTROL KIT SYS

## (undated) DEVICE — NEEDLE SCLERO 25GA L240CM OD0.51MM ID0.24MM EXTN L4MM SHTH

## (undated) DEVICE — TIP SUCT TRNSPAR RIB SURF STD BLB RIG NVENT W/ 5IN1 CONN DYND50138] MEDLINE INDUSTRIES INC]

## (undated) DEVICE — ADHESIVE SKIN CLOSURE 4X22 CM PREMIERPRO EXOFINFUSION DISP

## (undated) DEVICE — Device

## (undated) DEVICE — ENDOSCOPIC KIT COMPLIANCE ENDOKIT

## (undated) DEVICE — TRAP FLUID BUFFALO FLTR

## (undated) DEVICE — STAPLER 60 RELOAD BLUE: Brand: SUREFORM

## (undated) DEVICE — SUTURE STRATAFIX SYMMETRIC SZ 1 L18IN ABSRB VLT CT1 L36CM SXPP1A404

## (undated) DEVICE — KENDALL SCD EXPRESS SLEEVES, KNEE LENGTH, MEDIUM: Brand: KENDALL SCD

## (undated) DEVICE — CONTAINER SPEC 20 ML LID NEUT BUFF FORMALIN 10 % POLYPR STS

## (undated) DEVICE — TRAP ENDOSCP POLYP 2 CHMBR DRAWER TYP

## (undated) DEVICE — ARM DRAPE

## (undated) DEVICE — PREP KIT PEEL PTCH POVIDONE IOD

## (undated) DEVICE — TOWEL SURG W17XL27IN STD BLU COT NONFENESTRATED PREWASHED

## (undated) DEVICE — (D)PREP SKN CHLRAPRP APPL 26ML -- CONVERT TO ITEM 371833

## (undated) DEVICE — VESSEL SEALER EXTEND: Brand: ENDOWRIST

## (undated) DEVICE — SLIM BODY SKIN STAPLER: Brand: APPOSE ULC

## (undated) DEVICE — SOLUTION IRRIG 1000ML H2O STRL BLT

## (undated) DEVICE — SUTURE ABSORBABLE MONOFILAMENT 2-0 WND CLOSURE GRN V-LOC 180 VLOCL0315

## (undated) DEVICE — BLADELESS OBTURATOR: Brand: WECK VISTA

## (undated) DEVICE — SUTURE VCRL SZ 0 L27IN ABSRB UD L36MM CT-1 1/2 CIR J260H

## (undated) DEVICE — PAD PT POS 36 IN SURGYPAD DISP

## (undated) DEVICE — Z DISCONTINUED USE 2717541 SUTURE STRATAFIX SZ 3-0 L30CM NONABSORBABLE UD L26MM FS 3/8

## (undated) DEVICE — HYPODERMIC SAFETY NEEDLE: Brand: MAGELLAN

## (undated) DEVICE — SUTURE VCRL SZ 3-0 L27IN ABSRB UD L26MM SH 1/2 CIR J416H

## (undated) DEVICE — SUTURE MCRYL SZ 4-0 L27IN ABSRB UD L19MM PS-2 1/2 CIR PRIM Y426H

## (undated) DEVICE — SOLUTION ANTIFOG VIS SYS CLEARIFY LAPSCP

## (undated) DEVICE — DEVON™ KNEE AND BODY STRAP 60" X 3" (1.5 M X 7.6 CM): Brand: DEVON

## (undated) DEVICE — STERILE-Z MAYO STAND COVERS CLEAR POLYETHYLENE STERILE UNIVERSAL FIT 20 PER CASE: Brand: STERILE-Z

## (undated) DEVICE — WOUND RETRACTOR AND PROTECTOR: Brand: ALEXIS WOUND PROTECTOR-RETRACTOR

## (undated) DEVICE — 1LYRTR 16FR10ML100%SIL UMS SNP: Brand: MEDLINE INDUSTRIES, INC.

## (undated) DEVICE — SOLUTION IV 1000ML 0.9% SOD CHL PH 5 INJ USP VIAFLX PLAS

## (undated) DEVICE — ELECTRODE BLDE L4IN NONINSULATED EDGE

## (undated) DEVICE — REM POLYHESIVE ADULT PATIENT RETURN ELECTRODE: Brand: VALLEYLAB

## (undated) DEVICE — IV START KIT: Brand: MEDLINE

## (undated) DEVICE — TUBING, SUCTION, 1/4" X 10', STRAIGHT: Brand: MEDLINE

## (undated) DEVICE — OSCILLATING TIP SAW CARTRIDGE: Brand: PRECISION FALCON

## (undated) DEVICE — STERILE POLYISOPRENE POWDER-FREE SURGICAL GLOVES: Brand: PROTEXIS

## (undated) DEVICE — HANDLE LT SNAP ON ULT DURABLE LENS FOR TRUMPF ALC DISPOSABLE

## (undated) DEVICE — FLOSEAL MATRIX IS INDICATED IN SURGICAL PROCEDURES (OTHER THAN IN OPHTHALMIC) AS AN ADJUNCT TO HEMOSTASIS WHEN CONTROL OF BLEEDING BY LIGATURE OR CONVENTIONALPROCEDURES IS INEFFECTIVE OR IMPRACTICAL.: Brand: FLOSEAL HEMOSTATIC MATRIX

## (undated) DEVICE — SMOKE EVACUATION PENCIL: Brand: VALLEYLAB

## (undated) DEVICE — PIN EXT FIX SCHNZ 3 MM

## (undated) DEVICE — YANKAUER,BULB TIP,W/O VENT,RIGID,STERILE: Brand: MEDLINE

## (undated) DEVICE — SET SPR SNAP LOK 40CM 360DEG ENDOSCP APPL FLX W DUPLOSPR

## (undated) DEVICE — CUFF BLD PRSS AD CLTH SGL TB W/ BAYNT CONN ROUNDED CORNER

## (undated) DEVICE — SYR 50ML LR LCK 1ML GRAD NSAF --

## (undated) DEVICE — INSTRUMENT BATTERY

## (undated) DEVICE — SUTURE 1 STRATAFIX SYMMETRIC PDS + 30CM CT-1 SXPP1A435

## (undated) DEVICE — STAPLER 60: Brand: SUREFORM

## (undated) DEVICE — NDL SPNE QNCKE 18GX3.5IN LF --

## (undated) DEVICE — SUTURE VCRL SZ 0 L27IN ABSRB UD SH L26MM 1/2 CIR TAPERPOINT J418H

## (undated) DEVICE — SET GRAV CK VLV NEEDLESS ST 3 GANGED 4WAY STPCOCK HI FLO 10

## (undated) DEVICE — MARKER ENDO 10ML INK VI STRL ENDOMARK

## (undated) DEVICE — GLOVE SURG SZ 7.5 L11.2IN THK9.8MIL STRW LTX POLYMER BEAD

## (undated) DEVICE — 4-PORT MANIFOLD: Brand: NEPTUNE 2

## (undated) DEVICE — DRAPE,REIN 53X77,STERILE: Brand: MEDLINE

## (undated) DEVICE — 3M™ IOBAN™ 2 ANTIMICROBIAL INCISE DRAPE 6651EZ: Brand: IOBAN™ 2

## (undated) DEVICE — GENERAL LAPAROSCOPY-MRMC: Brand: MEDLINE INDUSTRIES, INC.